# Patient Record
Sex: MALE | Race: WHITE | Employment: OTHER | URBAN - METROPOLITAN AREA
[De-identification: names, ages, dates, MRNs, and addresses within clinical notes are randomized per-mention and may not be internally consistent; named-entity substitution may affect disease eponyms.]

---

## 2017-01-24 ENCOUNTER — OFFICE VISIT (OUTPATIENT)
Dept: DERMATOLOGY | Facility: CLINIC | Age: 62
End: 2017-01-24
Payer: MEDICARE

## 2017-01-24 DIAGNOSIS — L98.9 SKIN EROSION: Primary | ICD-10-CM

## 2017-01-24 PROCEDURE — 99213 OFFICE O/P EST LOW 20 MIN: CPT | Performed by: DERMATOLOGY

## 2017-01-24 PROCEDURE — 87070 CULTURE OTHR SPECIMN AEROBIC: CPT | Performed by: DERMATOLOGY

## 2017-01-24 RX ORDER — LEVOFLOXACIN 750 MG/1
TABLET, FILM COATED ORAL DAILY
COMMUNITY

## 2017-01-24 RX ORDER — GUAIFENESIN 400 MG/1
TABLET ORAL 4 TIMES DAILY
COMMUNITY

## 2017-01-24 NOTE — Clinical Note
Patient:  Marcus Cevallos  :   1955  MRN:     [id number]        Mr.William WALTER Cevallos  [address] [address]        2017      Dear Marcus Cevalols,    We are writing to inform you of your test results that show no evidence of skin infection.     Thank you for taking the time to be seen in our dermatology clinic. If you have further questions or concerns, please contact the clinic(see phone number listed below).      Sincerely,    Pastora Peterson MD    Department of Dermatology  [facility]: Phone: [phone], Fax:[phone]  [facility]: Phone: [phone], Fax: [phone]    Resulted Orders   Skin Culture Aerobic Bacterial   Result Value Ref Range    Specimen Description Left Buttock     Culture Micro Moderate growth Normal skin rahel     Micro Report Status FINAL 2017

## 2017-01-24 NOTE — PROGRESS NOTES
[facility] Dermatology Note      Dermatology Problem List:  1. Intertrigo, groin  -Current Tx: Bactroban ointment (intiaited 6/13/2016), A&D ointment (initiated 10/16/2015)  -Previous Tx: Hibiclens (initiated 6/13/2016), clindamycin lotion (10/21/2015-6/13/2016), gentamicin ointment (initiated 10/21/2015), ketoconazole cream (10/16/2015-6/13/2016)  -s/p culture showing Klebsiella pneumoniae, Citrobacter freundii complex, Proteus mirabilis, Beta hemolytic Streptococcus group B and S. aureus 10/16/2015    Encounter Date: Jan 24, 2017    CC:  Chief Complaint   Patient presents with     RECHECK     6 month follow up - Rash and sore on buttock - care team states that the rash is better, the sore re-appeared about 5 days ago - it is not deep but it is light pink       History of Present Illness:  Mr. Marcus Cevallos is a 61 year old male who presents as a follow-up for lesion on the buttock. The patient was last seen 7/18/2016 when Gentamycin was held and Bactroban and A&D ointment continued for intertrigo and ulceration. Today, the patient's caretaker reports that the sore on the buttocks has returned in the past 5 days. Notes the patient spends a lot of time on his back and buttocks. The patient reports no other lesions of concern.      Past Medical History:   Patient Active Problem List   Diagnosis     Cerebral palsy (H)     Mental retardation     Sleep apnea     Retinal detachment, left     Seizure disorder (H)     CVA (cerebral vascular accident) (H)     Hypothyroid     Osteopenia     Overweight (BMI 25.0-29.9)     Hypertension goal BP (blood pressure) < 130/80     Hyperlipidemia LDL goal <100     Blepharitis     Vitamin D deficiency disease     History of retinal detachment     Osteoporosis     Past Medical History   Diagnosis Date     Hypothyroid      Obesity, unspecified      HTN (hypertension)      CVA (cerebral vascular accident) (H)      Seizure disorder (H)      Retinal detachment, left       Sleep apnea      Mental retardation      Cerebral palsy (H)      Cataract      No past surgical history on file.    Social History:  The patient is a vulnerable adult.     Family History:   Not obtained at visit.     Medications:  Current Outpatient Prescriptions   Medication Sig Dispense Refill     perampanel (FYCOMPA) 6 MG tablet Take one tablet (6 mg) at HS 30 tablet 3     topiramate (TOPAMAX) 25 MG tablet Take 2 tab (50 mg) per G-tube twice daily for 3 weeks, then 1 tab bid (25mg) for 3 weeks, then stop 130 tablet 0     Ascorbic Acid (VITAMIN C PO) Take 500 mg by mouth daily       diazepam (DIAZEPAM INTENSOL) 5 MG/ML solution Give 1 ml buccally prn for a convulsive seizure > 1 min, 3 min of a non-convulsive seizure or for 2nd non convulsive seizure within 2 hrs of his first seizure. May repeat x 1 after 15 min. In 24 hours. 30 mL 1     valproic acid (DEPAKENE) 250 MG/5ML SYRP GIVE 10ML (500MG) PER G-TUBE TWICE DAILY 620 mL 5     levETIRAcetam (KEPPRA) 100 MG/ML solution GIVE 12.5ML (1250MG) PER G-TUBE TWICE DAILY 775 mL 11     loratadine (CLARITIN) 10 MG tablet Take 10 mg by mouth daily       bisacodyl (DULCOLAX) 10 MG suppository Place 10 mg rectally daily as needed for constipation       ACETAMINOPHEN  mg by Oral or G tube route as needed for pain       clindamycin (CLEOCIN T) 1 % lotion Apply topically 2 times daily 60 mL 11     polyvinyl alcohol (ARTIFICIAL TEARS) 1.4 % ophthalmic solution Instill one drop into both eyes 3 times a day. 6 mL 12     dimethicone (REMEDY NUTRASHIELD) 1 %        Aloe-Sodium Chloride (AYR SALINE NASAL GEL NA)        alendronate (FOSAMAX) 70 MG tablet Take 1 tablet every 7 days via G tube. Take with water and without food or other medication for at least 30 minutes. 13 tablet 3     ergocalciferol (CALCIFEROL) 8000 UNIT/ML drops 12 drops per g tube twice daily 30 mL      Polyethylene Glycol 3350 (MIRALAX PO) Take 8.5 mg by mouth daily        NYSTATIN EX Externally apply  topically as needed        aspirin 81 MG tablet Take 1 tablet by mouth daily. (*).   3     CARBAMIDE PEROXIDE 6.5 % OT SOLN as needed       PROCTOSOL HC 2.5 % RE CREA as needed       PATADAY 0.2 % OP SOLN None Entered       ALBUTEROL SULFATE (2.5 MG/3ML) 0.083% IN NEBU as needed       REPLETE/FIBER OR LIQD 1 250cc CAN PER GT 4X/DAY WITH 325CC WATER 4X/DAY=5.5 cans total  0     LEVOTHYROXINE SODIUM 50 MCG OR TABS 1 TABLET DAILY       CALCIUM CARBONATE 1250 MG/5ML OR SUSP 1 teaspoon down G tube daily 90 days 3       Allergies   Allergen Reactions     No Known Allergies          Review of Systems:  -Skin: As above in HPI. No additional skin concerns.  -Const- has had pneumonia he is getting over.   Physical exam:  There were no vitals taken for this visit.  GEN: This is a well developed, well-nourished male in no acute distress, in a pleasant mood.    SKIN: Focused examination of the buttocks and groin was performed.  -Erythematous patch with area of acanthosis and focal area of maceration on the left buttocks.   -Bilateral groin are WNL.   -No other lesions of concern on areas examined.     Impression/Plan:  1. Skin erosion, left buttock. Likely secondary to pressure. Previously resolved, recently returned.     Restart Bactroban ointment prn.    Restart A&D Ointment once daily to the area.     Recommend spending more time on his right side instead of his back.    Culture obtained today.      Plan to contact ortho to discuss pads to relieve pressure from wheelchair. DME for wheel chair cushion written  2. Intertrigo, groin. Improved with topicals.     Continue Bactroban ointment as needed.     Continue A&D ointment with brief changes    Follow-up in 2 months, earlier for new or changing lesions.     Staff Involved:  Scribe/Staff    Scribe Disclosure:   I, Emelyn Rodriguez, am serving as a scribe to document services personally performed by Dr. Pastora Peterson, based on data collection and the provider's statements to me.      Provider Disclosure:   I agree with above History, Review of Systems, Physical exam and Plan. I have reviewed the content of the documentation and have edited it as needed. I have personally performed the services documented here and the documentation accurately represents those services and the decisions I have made.     Pastora Peterson MD    Department of Dermatology  [facility]: Phone: [phone], Fax:[phone]  [facility]: Phone: [phone], Fax: [phone]

## 2017-01-24 NOTE — NURSING NOTE
Dermatology Rooming Note    Marcus Cevallos's goals for this visit include:   Chief Complaint   Patient presents with     RECHECK     6 month follow up - Rash and sore on buttock - care team states that the rash is better, the sore re-appeared about 5 days ago - it is not deep but it is light pink       Is a scribe okay for this visit: YES    Are records needed for this visit(If yes, obtain release of information): NO     Vitals: There were no vitals taken for this visit.    Referring Provider:  No referring provider defined for this encounter.    Danielle Plasencia, CMA

## 2017-01-26 DIAGNOSIS — G40.909 SEIZURE DISORDER (H): Primary | ICD-10-CM

## 2017-01-26 LAB
BACTERIA SPEC CULT: NORMAL
MICRO REPORT STATUS: NORMAL
SPECIMEN SOURCE: NORMAL

## 2017-02-05 ENCOUNTER — OFFICE VISIT (OUTPATIENT)
Dept: URGENT CARE | Facility: URGENT CARE | Age: 62
End: 2017-02-05
Payer: MEDICARE

## 2017-02-05 VITALS
HEART RATE: 82 BPM | OXYGEN SATURATION: 96 % | BODY MASS INDEX: 28.26 KG/M2 | TEMPERATURE: 98.9 F | DIASTOLIC BLOOD PRESSURE: 63 MMHG | SYSTOLIC BLOOD PRESSURE: 114 MMHG | RESPIRATION RATE: 16 BRPM | WEIGHT: 149.5 LBS

## 2017-02-05 DIAGNOSIS — N39.0 BACTERIAL URINARY TRACT INFECTION: ICD-10-CM

## 2017-02-05 DIAGNOSIS — R30.0 DYSURIA: Primary | ICD-10-CM

## 2017-02-05 DIAGNOSIS — G80.9 CEREBRAL PALSY, UNSPECIFIED TYPE (H): ICD-10-CM

## 2017-02-05 DIAGNOSIS — A49.9 BACTERIAL URINARY TRACT INFECTION: ICD-10-CM

## 2017-02-05 DIAGNOSIS — R82.90 ABNORMAL URINALYSIS: ICD-10-CM

## 2017-02-05 DIAGNOSIS — G40.909 SEIZURE DISORDER (H): ICD-10-CM

## 2017-02-05 LAB
ALBUMIN UR-MCNC: 100 MG/DL
APPEARANCE UR: ABNORMAL
BACTERIA #/AREA URNS HPF: ABNORMAL /HPF
BILIRUB UR QL STRIP: NEGATIVE
COLOR UR AUTO: YELLOW
GLUCOSE UR STRIP-MCNC: NEGATIVE MG/DL
HGB UR QL STRIP: ABNORMAL
KETONES UR STRIP-MCNC: ABNORMAL MG/DL
LEUKOCYTE ESTERASE UR QL STRIP: ABNORMAL
NITRATE UR QL: POSITIVE
PH UR STRIP: 7.5 PH (ref 5–7)
RBC #/AREA URNS AUTO: ABNORMAL /HPF (ref 0–2)
SP GR UR STRIP: 1.02 (ref 1–1.03)
URN SPEC COLLECT METH UR: ABNORMAL
UROBILINOGEN UR STRIP-ACNC: 1 EU/DL (ref 0.2–1)
WBC #/AREA URNS AUTO: >100 /HPF (ref 0–2)

## 2017-02-05 PROCEDURE — 99213 OFFICE O/P EST LOW 20 MIN: CPT | Performed by: PHYSICIAN ASSISTANT

## 2017-02-05 PROCEDURE — 87186 SC STD MICRODIL/AGAR DIL: CPT | Performed by: PHYSICIAN ASSISTANT

## 2017-02-05 PROCEDURE — 87088 URINE BACTERIA CULTURE: CPT | Performed by: PHYSICIAN ASSISTANT

## 2017-02-05 PROCEDURE — 81001 URINALYSIS AUTO W/SCOPE: CPT | Performed by: PHYSICIAN ASSISTANT

## 2017-02-05 PROCEDURE — 87086 URINE CULTURE/COLONY COUNT: CPT | Performed by: PHYSICIAN ASSISTANT

## 2017-02-05 RX ORDER — SULFAMETHOXAZOLE/TRIMETHOPRIM 800-160 MG
1 TABLET ORAL 2 TIMES DAILY
Qty: 14 TABLET | Refills: 0 | Status: SHIPPED | OUTPATIENT
Start: 2017-02-05 | End: 2017-03-30

## 2017-02-05 RX ORDER — SULFAMETHOXAZOLE/TRIMETHOPRIM 800-160 MG
1 TABLET ORAL 2 TIMES DAILY
Qty: 14 TABLET | Refills: 0 | Status: SHIPPED | OUTPATIENT
Start: 2017-02-05 | End: 2017-02-05

## 2017-02-05 NOTE — PROGRESS NOTES
SUBJECTIVE:                                                    Marcus Cevallos is a 61 year old male who presents to clinic today for the following health issues:      Pt is here with his care taker. He states that pt has some odor in his urine x 2 days. Also, pt had two seizures yesterday and one this morning. Fever of 101.0 and was given Tylenol at 8:30AM today.    slight increase in baseline sz activity, + sinus congestion x 1 month, recent dx of PNA< tx'd with levaquin and azithormycin (through [facility] physicians)- finished levaquin 1/27.  Uses diaper.  Did have low grade temp last night, no cough,                Allergies   Allergen Reactions     No Known Allergies        Past Medical History   Diagnosis Date     Hypothyroid      Obesity, unspecified      HTN (hypertension)      CVA (cerebral vascular accident) (H)      Seizure disorder (H)      Retinal detachment, left      Sleep apnea      Mental retardation      Cerebral palsy (H)      Cataract          Current Outpatient Prescriptions on File Prior to Visit:  valproic acid (DEPAKENE) 250 MG/5ML SYRP syrup TAKE 10ML (500MG) PER G-TUBE TWICE DAILY.   levofloxacin (LEVAQUIN) 750 MG tablet Take by mouth daily 1 tab for 5 days - D/C 1.27.17   guaiFENesin 400 MG TABS Per packet instructions as needed   order for DME Equipment being ordered: Wheel Chair Cushion   perampanel (FYCOMPA) 6 MG tablet Take one tablet (6 mg) at HS   topiramate (TOPAMAX) 25 MG tablet Take 2 tab (50 mg) per G-tube twice daily for 3 weeks, then 1 tab bid (25mg) for 3 weeks, then stop   Ascorbic Acid (VITAMIN C PO) Take 500 mg by mouth daily   diazepam (DIAZEPAM INTENSOL) 5 MG/ML solution Give 1 ml buccally prn for a convulsive seizure > 1 min, 3 min of a non-convulsive seizure or for 2nd non convulsive seizure within 2 hrs of his first seizure. May repeat x 1 after 15 min. In 24 hours.   levETIRAcetam (KEPPRA) 100 MG/ML solution GIVE 12.5ML (1250MG) PER G-TUBE TWICE DAILY    loratadine (CLARITIN) 10 MG tablet Take 10 mg by mouth daily   bisacodyl (DULCOLAX) 10 MG suppository Place 10 mg rectally daily as needed for constipation   ACETAMINOPHEN  mg by Oral or G tube route as needed for pain   clindamycin (CLEOCIN T) 1 % lotion Apply topically 2 times daily   polyvinyl alcohol (ARTIFICIAL TEARS) 1.4 % ophthalmic solution Instill one drop into both eyes 3 times a day.   dimethicone (REMEDY NUTRASHIELD) 1 %    Aloe-Sodium Chloride (AYR SALINE NASAL GEL NA)    alendronate (FOSAMAX) 70 MG tablet Take 1 tablet every 7 days via G tube. Take with water and without food or other medication for at least 30 minutes.   ergocalciferol (CALCIFEROL) 8000 UNIT/ML drops 12 drops per g tube twice daily   Polyethylene Glycol 3350 (MIRALAX PO) Take 8.5 mg by mouth daily    NYSTATIN EX Externally apply topically as needed    aspirin 81 MG tablet Take 1 tablet by mouth daily. (*).    CARBAMIDE PEROXIDE 6.5 % OT SOLN as needed   PROCTOSOL HC 2.5 % RE CREA as needed   PATADAY 0.2 % OP SOLN None Entered   ALBUTEROL SULFATE (2.5 MG/3ML) 0.083% IN NEBU as needed   REPLETE/FIBER OR LIQD 1 250cc CAN PER GT 4X/DAY WITH 325CC WATER 4X/DAY=5.5 cans total   LEVOTHYROXINE SODIUM 50 MCG OR TABS 1 TABLET DAILY   CALCIUM CARBONATE 1250 MG/5ML OR SUSP 1 teaspoon down G tube daily     No current facility-administered medications on file prior to visit.    Social History   Substance Use Topics     Smoking status: Never Smoker      Smokeless tobacco: Never Used     Alcohol Use: No       ROS:   GEN as above  RESP as above  ENT as above   as above    OBJECTIVE:  /63 mmHg  Pulse 82  Temp(Src) 98.9  F (37.2  C) (Oral)  Resp 16  Wt 149 lb 8 oz (67.813 kg)  SpO2 96%   General:   awake, alert, and cooperative.  NAD.   Head: Normocephalic, atraumatic.  Eyes: Conjunctiva clear,   Neuro: Alert and oriented - normal speech.    ASSESSMENT:well appearing, UA  C/w UTI    ICD-10-CM    1. Dysuria R30.0 *UA reflex to  Microscopic and Culture ([facility] and [facility] (except [place] and [place])     Urine Microscopic   2. Bacterial urinary tract infection N39.0 sulfamethoxazole-trimethoprim (BACTRIM DS) 800-160 MG per tablet    A49.9    3. Abnormal urinalysis R82.90 Urine Culture Aerobic Bacterial   4. Cerebral palsy, unspecified type (H) G80.9    5. Seizure disorder (H) G40.909        PLAN: Straight cath needed to get urine sample.  This was done with typical sterile technique, Urine dark yellow. Patient tolerated well       Advised about symptoms which might herald more serious problems.

## 2017-02-05 NOTE — PATIENT INSTRUCTIONS
"   * BLADDER INFECTION: Male (Adult)    A bladder infection (\"cystitis\" or \"UTI\") usually causes a constant urge to urinate, and a burning when passing urine. Urine may be cloudy, smelly or dark. There may be also be pain in the lower abdomen.  Cystitis in males is not common. It may be caused by a partial blockage in the urinary system that keeps the bladder from emptying completely. This is most often related to an enlarged prostate gland.  HOME CARE:  1. Drink lots of fluids (at least 6-8 glasses a day). This will flush the bacteria out of your bladder. Cranberry juice has been shown to help clear out the bacteria.  2. Avoid sexual intercourse until your symptoms are gone.  3. A bladder infection is treated with antibiotics. You may also be given Pyridium (generic - phenazopyridine) to reduce burning with urination. This will cause urine to become a bright orange color, which can stain clothing.  FOLLOW UP with your doctor or this facility if ALL symptoms have not cleared within five days. It is important to keep your follow up appointment to discuss with your doctor the need for further tests of the urinary tract.  GET PROMPT MEDICAL ATTENTION if any of the following occur:    Fever over 101  F (38.3  C)    No improvement by the third day of treatment    Increasing back or abdominal pain    Repeated vomiting; unable to keep medicine down    Weakness, dizziness or fainting    1806-1002 [employer], [address] [place] [address], [place] [address]. All rights reserved. This information is not intended as a substitute for professional medical care. Always follow your healthcare professional's instructions.    "

## 2017-02-05 NOTE — NURSING NOTE
"Chief Complaint   Patient presents with     Urinary Problem     Froday. Care taker states that pt has some strong odor in his urine       Initial /63 mmHg  Pulse 82  Temp(Src) 98.9  F (37.2  C) (Oral)  Wt 149 lb 8 oz (67.813 kg)  SpO2 96% Estimated body mass index is 28.26 kg/(m^2) as calculated from the following:    Height as of 12/9/16: 5' 0.98\" (1.549 m).    Weight as of this encounter: 149 lb 8 oz (67.813 kg).  Medication Reconciliation: complete   Hasmukh Farias/MA      "

## 2017-02-05 NOTE — MR AVS SNAPSHOT
"              After Visit Summary   2/5/2017    Marcus Cevallos    MRN: [id number]           Patient Information     Date Of Birth          1955        Visit Information        Provider Department      2/5/2017 1:35 PM Salomon Gilman PA [facility]        Today's Diagnoses     Dysuria    -  1     Bacterial urinary tract infection         Abnormal urinalysis         Cerebral palsy, unspecified type (H)         Seizure disorder (H)           Care Instructions       * BLADDER INFECTION: Male (Adult)    A bladder infection (\"cystitis\" or \"UTI\") usually causes a constant urge to urinate, and a burning when passing urine. Urine may be cloudy, smelly or dark. There may be also be pain in the lower abdomen.  Cystitis in males is not common. It may be caused by a partial blockage in the urinary system that keeps the bladder from emptying completely. This is most often related to an enlarged prostate gland.  HOME CARE:  1. Drink lots of fluids (at least 6-8 glasses a day). This will flush the bacteria out of your bladder. Cranberry juice has been shown to help clear out the bacteria.  2. Avoid sexual intercourse until your symptoms are gone.  3. A bladder infection is treated with antibiotics. You may also be given Pyridium (generic - phenazopyridine) to reduce burning with urination. This will cause urine to become a bright orange color, which can stain clothing.  FOLLOW UP with your doctor or this facility if ALL symptoms have not cleared within five days. It is important to keep your follow up appointment to discuss with your doctor the need for further tests of the urinary tract.  GET PROMPT MEDICAL ATTENTION if any of the following occur:    Fever over 101  F (38.3  C)    No improvement by the third day of treatment    Increasing back or abdominal pain    Repeated vomiting; unable to keep medicine down    Weakness, dizziness or fainting    9800-8771 [employer], [address] [place] [address]. All rights reserved. This information is not intended as a substitute for professional medical care. Always follow your healthcare professional's instructions.          Follow-ups after your visit        Follow-up notes from your care team     Return if symptoms worsen or fail to improve.      Your next 10 appointments already scheduled     Mar 30, 2017 10:00 AM   [facility]    [address] [place] [address] [id number] [phone]            Apr 21, 2017  9:00 AM   Return Visit with Nanda De La Torre MD   [facility] ([facility])    [address], [address]  [facility] [id number] [phone]            Jun 30, 2017  8:30 AM   Return Visit with Sona Kapoor MD   [facility]    [address] [place] [id number] [phone]              Who to contact     If you have questions or need follow up information about today's clinic visit or your schedule please contact [facility] directly at [phone].  Normal or non-critical lab and imaging results will be communicated to you by MyChart, letter or phone within 4 business days after the clinic has received the results. If you do not hear from us within 7 days, please contact the clinic through MyChart or phone. If you have a critical or abnormal lab result, we will notify you by phone as soon as possible.  Submit refill requests through [email or web address] or call your pharmacy and they will forward the refill request to us. Please allow 3 business days for your refill to be completed.          Additional Information About Your Visit        MyChart Information     [email or web address] lets you send messages to your doctor, view your test results, renew your prescriptions, schedule appointments and more. To sign up, go to [email or web address] . Click  "on \"Log in\" on the left side of the screen, which will take you to the Welcome page. Then click on \"Sign up Now\" on the right side of the page.     You will be asked to enter the access code listed below, as well as some personal information. Please follow the directions to create your username and password.     Your access code is: [email or web address]  Expires: 2017  4:03 PM     Your access code will  in 90 days. If you need help or a new code, please call your [place] clinic or [phone].        Care EveryWhere ID     This is your Care EveryWhere ID. This could be used by other organizations to access your [place] medical records  [id number]        Your Vitals Were     Pulse Temperature Respirations Pulse Oximetry          82 98.9  F (37.2  C) (Oral) 16 96%         Blood Pressure from Last 3 Encounters:   17 114/63   16 127/74   16 135/78    Weight from Last 3 Encounters:   17 149 lb 8 oz (67.813 kg)   16 149 lb 9.6 oz (67.858 kg)   16 151 lb 9.6 oz (68.765 kg)              We Performed the Following     *UA reflex to Microscopic and Culture ([facility] (except [place] and [place])     Urine Culture Aerobic Bacterial     Urine Microscopic          Today's Medication Changes          These changes are accurate as of: 17  4:03 PM.  If you have any questions, ask your nurse or doctor.               Start taking these medicines.        Dose/Directions    sulfamethoxazole-trimethoprim 800-160 MG per tablet   Commonly known as:  BACTRIM DS   Used for:  Bacterial urinary tract infection   Started by:  Salomon Gilman PA        Dose:  1 tablet   Take 1 tablet by mouth 2 times daily for 7 days   Quantity:  14 tablet   Refills:  0            Where to get your medicines      These medications were sent to [facility] - [place], [place] - [address]  [address] [facility] [address]     Phone:  " [phone]    - sulfamethoxazole-trimethoprim 800-160 MG per tablet             Primary Care Provider Office Phone # Fax #    Maria Eugenia WATSON Fili Dowell MD, [phone]       [facility] [address]  [facility] [address]        Thank you!     Thank you for choosing [facility]  for your care. Our goal is always to provide you with excellent care. Hearing back from our patients is one way we can continue to improve our services. Please take a few minutes to complete the written survey that you may receive in the mail after your visit with us. Thank you!             Your Updated Medication List - Protect others around you: Learn how to safely use, store and throw away your medicines at www.disposemymeds.org.          This list is accurate as of: 2/5/17  4:03 PM.  Always use your most recent med list.                   Brand Name Dispense Instructions for use    ACETAMINOPHEN PO      325 mg by Oral or G tube route as needed for pain       albuterol (2.5 MG/3ML) 0.083% neb solution      as needed       alendronate 70 MG tablet    FOSAMAX    13 tablet    Take 1 tablet every 7 days via G tube. Take with water and without food or other medication for at least 30 minutes.       aspirin 81 MG tablet      Take 1 tablet by mouth daily. (*).       AYR SALINE NASAL GEL NA          bisacodyl 10 MG Suppository    DULCOLAX     Place 10 mg rectally daily as needed for constipation       CALCIFEROL 8000 UNIT/ML drops   Generic drug:  ergocalciferol     30 mL    12 drops per g tube twice daily       calcium carbonate 1250 (500 CA) MG/5ML Susp suspension     90 days    1 teaspoon down G tube daily       carbamide peroxide 6.5 % otic solution    DEBROX     as needed       clindamycin 1 % lotion    CLEOCIN T    60 mL    Apply topically 2 times daily       diazepam 5 MG/ML (HIGH CONC) solution    DIAZEPAM INTENSOL    30 mL    Give 1 ml buccally prn for a convulsive seizure > 1 min,  3 min of a non-convulsive seizure or for 2nd non convulsive seizure within 2 hrs of his first seizure. May repeat x 1 after 15 min. In 24 hours.       guaiFENesin 400 MG Tabs      Per packet instructions as needed       levETIRAcetam 100 MG/ML solution    KEPPRA    775 mL    GIVE 12.5ML (1250MG) PER G-TUBE TWICE DAILY       levofloxacin 750 MG tablet    LEVAQUIN     Take by mouth daily 1 tab for 5 days - D/C 1.27.17       levothyroxine 50 MCG tablet    SYNTHROID/LEVOTHROID     1 TABLET DAILY       loratadine 10 MG tablet    CLARITIN     Take 10 mg by mouth daily       MIRALAX PO      Take 8.5 mg by mouth daily       NYSTATIN EX      Externally apply topically as needed       order for DME     1 each    Equipment being ordered: Wheel Chair Cushion       PATADAY 0.2 % Soln   Generic drug:  olopatadine HCl      None Entered       perampanel 6 MG tablet    FYCOMPA    30 tablet    Take one tablet (6 mg) at HS       polyvinyl alcohol 1.4 % ophthalmic solution    ARTIFICIAL TEARS    6 mL    Instill one drop into both eyes 3 times a day.       PROCTOSOL HC 2.5 % cream   Generic drug:  hydrocortisone      as needed       REMEDY NUTRASHIELD 1 %   Generic drug:  dimethicone          REPLETE/FIBER Liqd      1 250cc CAN PER GT 4X/DAY WITH 325CC WATER 4X/DAY=5.5 cans total       sulfamethoxazole-trimethoprim 800-160 MG per tablet    BACTRIM DS    14 tablet    Take 1 tablet by mouth 2 times daily for 7 days       topiramate 25 MG tablet    TOPAMAX    130 tablet    Take 2 tab (50 mg) per G-tube twice daily for 3 weeks, then 1 tab bid (25mg) for 3 weeks, then stop       valproic acid 250 MG/5ML Syrp syrup    DEPAKENE    620 mL    TAKE 10ML (500MG) PER G-TUBE TWICE DAILY.       VITAMIN C PO      Take 500 mg by mouth daily

## 2017-02-07 ENCOUNTER — TELEPHONE (OUTPATIENT)
Dept: URGENT CARE | Facility: URGENT CARE | Age: 62
End: 2017-02-07

## 2017-02-07 ENCOUNTER — TELEPHONE (OUTPATIENT)
Dept: FAMILY MEDICINE | Facility: CLINIC | Age: 62
End: 2017-02-07

## 2017-02-07 DIAGNOSIS — A49.9 BACTERIAL URINARY TRACT INFECTION: Primary | ICD-10-CM

## 2017-02-07 DIAGNOSIS — N39.0 BACTERIAL URINARY TRACT INFECTION: Primary | ICD-10-CM

## 2017-02-07 LAB
BACTERIA SPEC CULT: ABNORMAL
MICRO REPORT STATUS: ABNORMAL
MICROORGANISM SPEC CULT: ABNORMAL
SPECIMEN SOURCE: ABNORMAL

## 2017-02-07 RX ORDER — CEPHALEXIN 500 MG/1
500 CAPSULE ORAL 3 TIMES DAILY
Qty: 21 CAPSULE | Refills: 0 | Status: SHIPPED | OUTPATIENT
Start: 2017-02-07 | End: 2017-02-14

## 2017-02-07 RX ORDER — NITROFURANTOIN 25; 75 MG/1; MG/1
100 CAPSULE ORAL 2 TIMES DAILY
Qty: 14 CAPSULE | Refills: 0 | Status: SHIPPED | OUTPATIENT
Start: 2017-02-07 | End: 2017-02-14

## 2017-02-07 NOTE — TELEPHONE ENCOUNTER
We can try keflex instead.  Patient should d/c bactrim as UTI is resistant- please inform pharmacy and caregivers  Jaydon Gilman PA-C

## 2017-02-07 NOTE — TELEPHONE ENCOUNTER
Notes Recorded by Salomon Gilman PA on 2/7/2017 at 9:30 AM  Please call caregiver about culture results which grew bacteria resistant to the antibiotic prescribed.  I have prescribed a different medication which should be started today.  The current antibiotic should be stopped.  Patient should contact the clinic with additional questions  Salomon Gilman     Notes Recorded by Evan Summers MD on 2/6/2017 at 2:03 PM  Micro report pending, RX Bactrim.  Eavn Summers MD  February 6, 2017 2:03 PM    This writer attempted to contact Marcus's care taker on 02/07/2017.    Was call answered?  No.  Unable to leave message.    If patient calls back, please Contact Clinic Care (MA/RN) Team. If no one available, send encounter message.    Brittaney Hewitt RN

## 2017-02-07 NOTE — TELEPHONE ENCOUNTER
[phone] nitrofurantoin, macrocrystal-monohydrate, (MACROBID) 100 MG capsule  Patient has a G-tube and his medication cannot be used with G-tube.  Can this be changed?    Group home staff is asking if patient should continue to use Bactrim.    Please call to advise.    Thank you,    Neris Ferrell

## 2017-02-07 NOTE — TELEPHONE ENCOUNTER
Called and spoke with Con[facility]is at [facility]. She states that the medication was received and that the patient is informed of this.    Amanda Philip CMA ([facility])

## 2017-03-01 ENCOUNTER — TELEPHONE (OUTPATIENT)
Dept: NEUROLOGY | Facility: CLINIC | Age: 62
End: 2017-03-01

## 2017-03-01 DIAGNOSIS — G40.419 INTRACTABLE GENERALIZED TONIC-CLONIC EPILEPSY (H): ICD-10-CM

## 2017-03-01 RX ORDER — DIAZEPAM ORAL SOLUTION (CONCENTRATE) 5 MG/ML
SOLUTION ORAL
Qty: 30 ML | Refills: 0 | Status: SHIPPED | OUTPATIENT
Start: 2017-03-01 | End: 2017-06-22

## 2017-03-17 ENCOUNTER — TELEPHONE (OUTPATIENT)
Dept: FAMILY MEDICINE | Facility: CLINIC | Age: 62
End: 2017-03-17

## 2017-03-17 NOTE — LETTER
[facility]       [address]  [place] [address]      March 17, 2017      Marcus Cevallos  [address] [place] [address]          Dear Marcus Cevallos,      At [facility] we care about your health and are committed to providing quality patient care, which includes staying current on preventative cancer screenings.  You can increase your chances of finding and treating cancers through regular screenings.      Our records show that you are due for the following screening(s):    Colonoscopy for colon cancer  [facility] [phone]  Recommended every ten years for everyone age 50 and older  We strongly urge our patient's to consider having a colonoscopy done, which is the best screening test available and only needs to be done every 10 years if normal.      Other option is that you can do a FIT and this is once a year.  Any questions or concern, please contact us at [phone].    You may contact the closest location to schedule the screening test(s) at your earliest convenience.    If you have already had one or all of the above screening tests at another facility, please call us so that we may update your chart.      Sincerely,         Jeb Acosta MD/ MILKA  [facility]

## 2017-03-17 NOTE — TELEPHONE ENCOUNTER
Panel Management Review      Patient has the following on his problem list:     Hypertension   Last three blood pressure readings:  BP Readings from Last 3 Encounters:   02/05/17 114/63   12/09/16 127/74   11/28/16 135/78     Blood pressure: Passed    HTN Guidelines:  Age 18-59 BP range:  Less than 140/90  Age 60-85 with Diabetes:  Less than 140/90  Age 60-85 without Diabetes:  less than 150/90      Composite cancer screening  Chart review shows that this patient is due/due soon for the following Fecal Colorectal (FIT)  Summary:    Patient is due/failing the following:   COLONOSCOPY    Action needed:   Patient needs office visit for physical.    Type of outreach:    Phone, left message for patient to call back.  and Sent letter.    Questions for provider review:    None                                                                                                                                    Gregory Nichols MA

## 2017-03-30 ENCOUNTER — OFFICE VISIT (OUTPATIENT)
Dept: DERMATOLOGY | Facility: CLINIC | Age: 62
End: 2017-03-30
Payer: MEDICARE

## 2017-03-30 DIAGNOSIS — L30.4 INTERTRIGO: ICD-10-CM

## 2017-03-30 DIAGNOSIS — D18.01 CHERRY ANGIOMA: Primary | ICD-10-CM

## 2017-03-30 PROCEDURE — 99213 OFFICE O/P EST LOW 20 MIN: CPT | Performed by: DERMATOLOGY

## 2017-03-30 NOTE — PROGRESS NOTES
[facility] Dermatology Note      Dermatology Problem List:  1. Intertrigo, groin  -Current Tx: Bactroban ointment (intiaited 6/13/2016), A&D ointment (initiated 10/16/2015)  -Previous Tx: Hibiclens (initiated 6/13/2016), clindamycin lotion (10/21/2015-6/13/2016), gentamicin ointment (initiated 10/21/2015), ketoconazole cream (10/16/2015-6/13/2016)  -s/p culture showing Klebsiella pneumoniae, Citrobacter freundii complex, Proteus mirabilis, Beta hemolytic Streptococcus group B and S. aureus 10/16/2015  2. Erosion, buttocks  -Current Tx: A&D Ointment, Bactroban  -culture showing no growth 1/24/2017    Encounter Date: Mar 30, 2017    CC:  Chief Complaint   Patient presents with     Derm Problem     rassore  in groin  area ok today. spiratic irritation through out the month. will have periods of no irritation and periods when it is sore and irritated       History of Present Illness:  Mr. Marcus Cevallos is a 62 year old male who presents as a follow up for erosions and intertrigo. The patient was last seen 1/24/2017 when Bactroban and A&D Ointment were restarted and a culture taken for erosion on the buttock. Topicals for intertrigo of the groin were continued. Today, the patient's aid reports the lesion on the left buttocks is improved and closed now. Denies patient complaints of the lesion. His groin is red has also been stable. The patient's aid reports they are trying to avoid pressure on the area and he is being rotated every 2 hours. For the groin they are using Bactroban prn with improvement. For the buttocks they are using Neutrashield (from his PCP) with brief change. Reports unusual redness leaving tan spots on the left flank. The patient reports no other lesions of concern.     The patient is present with an personal care assistant today.     Past Medical History:   Patient Active Problem List   Diagnosis     Cerebral palsy (H)     Mental retardation     Sleep apnea     Retinal  detachment, left     Seizure disorder (H)     CVA (cerebral vascular accident) (H)     Hypothyroid     Osteopenia     Overweight (BMI 25.0-29.9)     Hypertension goal BP (blood pressure) < 130/80     Hyperlipidemia LDL goal <100     Blepharitis     Vitamin D deficiency disease     History of retinal detachment     Osteoporosis     Past Medical History:   Diagnosis Date     Cataract      Cerebral palsy (H)      CVA (cerebral vascular accident) (H)      HTN (hypertension)      Hypothyroid      Mental retardation      Obesity, unspecified      Retinal detachment, left      Seizure disorder (H)      Sleep apnea      No past surgical history on file.    Social History:  The patient uses a wheelchair. He lives in a group setting.     Family History:   Not obtained at visit.     Medications:  Current Outpatient Prescriptions   Medication Sig Dispense Refill     diazepam (DIAZEPAM INTENSOL) 5 MG/ML (HIGH CONC) solution Give 1 ml buccally prn for a convulsive seizure > 1 min, 3 min of a non-convulsive seizure or for 2nd non convulsive seizure within 2 hrs of his first seizure. May repeat x 1 after 15 min. In 24 hours. 30 mL 0     sulfamethoxazole-trimethoprim (BACTRIM DS) 800-160 MG per tablet Take 1 tablet by mouth 2 times daily 14 tablet 0     valproic acid (DEPAKENE) 250 MG/5ML SYRP syrup TAKE 10ML (500MG) PER G-TUBE TWICE DAILY. 620 mL 10     levofloxacin (LEVAQUIN) 750 MG tablet Take by mouth daily 1 tab for 5 days - D/C 1.27.17       guaiFENesin 400 MG TABS Per packet instructions as needed       order for DME Equipment being ordered: Wheel Chair Cushion 1 each 1     perampanel (FYCOMPA) 6 MG tablet Take one tablet (6 mg) at HS 30 tablet 3     topiramate (TOPAMAX) 25 MG tablet Take 2 tab (50 mg) per G-tube twice daily for 3 weeks, then 1 tab bid (25mg) for 3 weeks, then stop 130 tablet 0     Ascorbic Acid (VITAMIN C PO) Take 500 mg by mouth daily       levETIRAcetam (KEPPRA) 100 MG/ML solution GIVE 12.5ML (1250MG) PER  G-TUBE TWICE DAILY 775 mL 11     loratadine (CLARITIN) 10 MG tablet Take 10 mg by mouth daily       bisacodyl (DULCOLAX) 10 MG suppository Place 10 mg rectally daily as needed for constipation       ACETAMINOPHEN  mg by Oral or G tube route as needed for pain       clindamycin (CLEOCIN T) 1 % lotion Apply topically 2 times daily 60 mL 11     polyvinyl alcohol (ARTIFICIAL TEARS) 1.4 % ophthalmic solution Instill one drop into both eyes 3 times a day. 6 mL 12     dimethicone (REMEDY NUTRASHIELD) 1 %        Aloe-Sodium Chloride (AYR SALINE NASAL GEL NA)        alendronate (FOSAMAX) 70 MG tablet Take 1 tablet every 7 days via G tube. Take with water and without food or other medication for at least 30 minutes. 13 tablet 3     ergocalciferol (CALCIFEROL) 8000 UNIT/ML drops 12 drops per g tube twice daily 30 mL      Polyethylene Glycol 3350 (MIRALAX PO) Take 8.5 mg by mouth daily        NYSTATIN EX Externally apply topically as needed        aspirin 81 MG tablet Take 1 tablet by mouth daily. (*).   3     CARBAMIDE PEROXIDE 6.5 % OT SOLN as needed       PROCTOSOL HC 2.5 % RE CREA as needed       PATADAY 0.2 % OP SOLN None Entered       ALBUTEROL SULFATE (2.5 MG/3ML) 0.083% IN NEBU as needed       REPLETE/FIBER OR LIQD 1 250cc CAN PER GT 4X/DAY WITH 325CC WATER 4X/DAY=5.5 cans total  0     LEVOTHYROXINE SODIUM 50 MCG OR TABS 1 TABLET DAILY       CALCIUM CARBONATE 1250 MG/5ML OR SUSP 1 teaspoon down G tube daily 90 days 3     Allergies   Allergen Reactions     No Known Allergies      Review of Systems:  -Uro: Recent UTI  -Const: Recent pneumonia.   -Skin: As above in HPI. No additional skin concerns.    Physical exam:  There were no vitals taken for this visit.  GEN: This is a well developed, well-nourished male in no acute distress, in a pleasant mood.    SKIN: Full skin, which includes the head/face, both arms, chest, back, abdomen,both legs, genitalia and/or groin buttocks, digits and/or nails, was examined.  -Chest  and abdomen are normal.   -Skin is normal in the groin.   -There are bright red some shaped papules scattered on the trunk.   -no lesions on flank  -hyperpigmented faint tan patches, bilateral inguinal folds.   -No other lesions of concern on areas examined.     Impression/Plan:  1. Cherry angiomas, trunk    No further intervention required at this time.   2. Skin erosion, left buttock. Likely secondary to pressure. Healed with topical use. None seen today Pt immobile, in wheelchair    Continue regular A and D ointment and rotating patient  3. Intertrigo, groin. Improved with topicals.     Hold Bactroban.     Start Nystatin cream to the groin as needed.     Continue Neutroshield with brief change as per PCP.    Follow-up in 6 months, earlier for new or changing lesions.     Staff Involved:  Scribe/Staff    Scribe Disclosure:   I, Emelyn Rodriguez, am serving as a scribe to document services personally performed by Dr. Pastora Peterson, based on data collection and the provider's statements to me.     Provider Disclosure:   I agree with above History, Review of Systems, Physical exam and Plan. I have reviewed the content of the documentation and have edited it as needed. I have personally performed the services documented here and the documentation accurately represents those services and the decisions I have made.     Pastora Peterson MD    Department of Dermatology  [facility]: Phone: [phone], Fax:[phone]  [facility]: Phone: [phone], Fax: [phone]

## 2017-03-30 NOTE — LETTER
3/30/2017      RE: Marcus Cevallos  [address] [facility] [address]       [facility] Dermatology Note      Dermatology Problem List:  1. Intertrigo, groin  -Current Tx: Bactroban ointment (intiaited 6/13/2016), A&D ointment (initiated 10/16/2015)  -Previous Tx: Hibiclens (initiated 6/13/2016), clindamycin lotion (10/21/2015-6/13/2016), gentamicin ointment (initiated 10/21/2015), ketoconazole cream (10/16/2015-6/13/2016)  -s/p culture showing Klebsiella pneumoniae, Citrobacter freundii complex, Proteus mirabilis, Beta hemolytic Streptococcus group B and S. aureus 10/16/2015  2. Erosion, buttocks  -Current Tx: A&D Ointment, Bactroban  -culture showing no growth 1/24/2017    Encounter Date: Mar 30, 2017    CC:  Chief Complaint   Patient presents with     Derm Problem     rassore  in groin  area ok today. spiratic irritation through out the month. will have periods of no irritation and periods when it is sore and irritated       History of Present Illness:  Mr. Marcus Cevallos is a 62 year old male who presents as a follow up for erosions and intertrigo. The patient was last seen 1/24/2017 when Bactroban and A&D Ointment were restarted and a culture taken for erosion on the buttock. Topicals for intertrigo of the groin were continued. Today, the patient's aid reports the lesion on the left buttocks is improved and closed now. Denies patient complaints of the lesion. His groin is red has also been stable. The patient's aid reports they are trying to avoid pressure on the area and he is being rotated every 2 hours. For the groin they are using Bactroban prn with improvement. For the buttocks they are using Neutrashield (from his PCP) with brief change. Reports unusual redness leaving tan spots on the left flank. The patient reports no other lesions of concern.     The patient is present with an personal care assistant today.     Past Medical History:   Patient Active Problem  List   Diagnosis     Cerebral palsy (H)     Mental retardation     Sleep apnea     Retinal detachment, left     Seizure disorder (H)     CVA (cerebral vascular accident) (H)     Hypothyroid     Osteopenia     Overweight (BMI 25.0-29.9)     Hypertension goal BP (blood pressure) < 130/80     Hyperlipidemia LDL goal <100     Blepharitis     Vitamin D deficiency disease     History of retinal detachment     Osteoporosis     Past Medical History:   Diagnosis Date     Cataract      Cerebral palsy (H)      CVA (cerebral vascular accident) (H)      HTN (hypertension)      Hypothyroid      Mental retardation      Obesity, unspecified      Retinal detachment, left      Seizure disorder (H)      Sleep apnea      No past surgical history on file.    Social History:  The patient uses a wheelchair. He lives in a group setting.     Family History:   Not obtained at visit.     Medications:  Current Outpatient Prescriptions   Medication Sig Dispense Refill     diazepam (DIAZEPAM INTENSOL) 5 MG/ML (HIGH CONC) solution Give 1 ml buccally prn for a convulsive seizure > 1 min, 3 min of a non-convulsive seizure or for 2nd non convulsive seizure within 2 hrs of his first seizure. May repeat x 1 after 15 min. In 24 hours. 30 mL 0     sulfamethoxazole-trimethoprim (BACTRIM DS) 800-160 MG per tablet Take 1 tablet by mouth 2 times daily 14 tablet 0     valproic acid (DEPAKENE) 250 MG/5ML SYRP syrup TAKE 10ML (500MG) PER G-TUBE TWICE DAILY. 620 mL 10     levofloxacin (LEVAQUIN) 750 MG tablet Take by mouth daily 1 tab for 5 days - D/C 1.27.17       guaiFENesin 400 MG TABS Per packet instructions as needed       order for DME Equipment being ordered: Wheel Chair Cushion 1 each 1     perampanel (FYCOMPA) 6 MG tablet Take one tablet (6 mg) at HS 30 tablet 3     topiramate (TOPAMAX) 25 MG tablet Take 2 tab (50 mg) per G-tube twice daily for 3 weeks, then 1 tab bid (25mg) for 3 weeks, then stop 130 tablet 0     Ascorbic Acid (VITAMIN C PO) Take 500  mg by mouth daily       levETIRAcetam (KEPPRA) 100 MG/ML solution GIVE 12.5ML (1250MG) PER G-TUBE TWICE DAILY 775 mL 11     loratadine (CLARITIN) 10 MG tablet Take 10 mg by mouth daily       bisacodyl (DULCOLAX) 10 MG suppository Place 10 mg rectally daily as needed for constipation       ACETAMINOPHEN  mg by Oral or G tube route as needed for pain       clindamycin (CLEOCIN T) 1 % lotion Apply topically 2 times daily 60 mL 11     polyvinyl alcohol (ARTIFICIAL TEARS) 1.4 % ophthalmic solution Instill one drop into both eyes 3 times a day. 6 mL 12     dimethicone (REMEDY NUTRASHIELD) 1 %        Aloe-Sodium Chloride (AYR SALINE NASAL GEL NA)        alendronate (FOSAMAX) 70 MG tablet Take 1 tablet every 7 days via G tube. Take with water and without food or other medication for at least 30 minutes. 13 tablet 3     ergocalciferol (CALCIFEROL) 8000 UNIT/ML drops 12 drops per g tube twice daily 30 mL      Polyethylene Glycol 3350 (MIRALAX PO) Take 8.5 mg by mouth daily        NYSTATIN EX Externally apply topically as needed        aspirin 81 MG tablet Take 1 tablet by mouth daily. (*).   3     CARBAMIDE PEROXIDE 6.5 % OT SOLN as needed       PROCTOSOL HC 2.5 % RE CREA as needed       PATADAY 0.2 % OP SOLN None Entered       ALBUTEROL SULFATE (2.5 MG/3ML) 0.083% IN NEBU as needed       REPLETE/FIBER OR LIQD 1 250cc CAN PER GT 4X/DAY WITH 325CC WATER 4X/DAY=5.5 cans total  0     LEVOTHYROXINE SODIUM 50 MCG OR TABS 1 TABLET DAILY       CALCIUM CARBONATE 1250 MG/5ML OR SUSP 1 teaspoon down G tube daily 90 days 3     Allergies   Allergen Reactions     No Known Allergies      Review of Systems:  -Uro: Recent UTI  -Const: Recent pneumonia.   -Skin: As above in HPI. No additional skin concerns.    Physical exam:  There were no vitals taken for this visit.  GEN: This is a well developed, well-nourished male in no acute distress, in a pleasant mood.    SKIN: Full skin, which includes the head/face, both arms, chest, back,  abdomen,both legs, genitalia and/or groin buttocks, digits and/or nails, was examined.  -Chest and abdomen are normal.   -Skin is normal in the groin.   -There are bright red some shaped papules scattered on the trunk.   -no lesions on flank  -hyperpigmented faint tan patches, bilateral inguinal folds.   -No other lesions of concern on areas examined.     Impression/Plan:  1. Cherry angiomas, trunk    No further intervention required at this time.   2. Skin erosion, left buttock. Likely secondary to pressure. Healed with topical use. None seen today Pt immobile, in wheelchair    Continue regular A and D ointment and rotating patient  3. Intertrigo, groin. Improved with topicals.     Hold Bactroban.     Start Nystatin cream to the groin as needed.     Continue Neutroshield with brief change as per PCP.    Follow-up in 6 months, earlier for new or changing lesions.     Staff Involved:  Scribe/Staff    Scribe Disclosure:   I, Emelyn Rodriguez, am serving as a scribe to document services personally performed by Dr. Pastora Peterson, based on data collection and the provider's statements to me.     Provider Disclosure:   I agree with above History, Review of Systems, Physical exam and Plan. I have reviewed the content of the documentation and have edited it as needed. I have personally performed the services documented here and the documentation accurately represents those services and the decisions I have made.     Pastora Peterson MD    Department of Dermatology  [facility]: Phone: [phone], Fax:[phone]  [facility]: Phone: [phone], Fax: [phone]        Pastora Peterson MD

## 2017-03-30 NOTE — MR AVS SNAPSHOT
After Visit Summary   3/30/2017    Marcus Cevallos    MRN: [id number]           Patient Information     Date Of Birth          1955        Visit Information        Provider Department      3/30/2017 10:00 AM Pastora Peterson MD [facility]         Follow-ups after your visit        Your next 10 appointments already scheduled     Apr 21, 2017  9:00 AM CDT   Return Visit with Nanda De La Torre MD   [facility] [place] [facility] ([facility])    [address], [address]  [facility] [address] [phone]            Jun 30, 2017  8:30 AM CDT   Return Visit with Sona Kapoor MD   [facility] ([facility]    [address] [place] [address] [phone]              Who to contact     If you have questions or need follow up information about today's clinic visit or your schedule please contact [facility] directly at [phone].  Normal or non-critical lab and imaging results will be communicated to you by MyChart, letter or phone within 4 business days after the clinic has received the results. If you do not hear from us within 7 days, please contact the clinic through MyChart or phone. If you have a critical or abnormal lab result, we will notify you by phone as soon as possible.  Submit refill requests through [employer] or call your pharmacy and they will forward the refill request to us. Please allow 3 business days for your refill to be completed.          Additional Information About Your Visit        [employer] Information     [employer] is an electronic gateway that provides easy, online access to your medical records. With [employer], you can request a clinic appointment, read your test results, renew a prescription or communicate with your care team.     To sign up for [employer] visit the website at [email or web address]   You will be asked to enter the access code listed below, as well as some  personal information. Please follow the directions to create your username and password.     Your access code is: [email or web address]  Expires: 2017  5:03 PM     Your access code will  in 90 days. If you need help or a new code, please contact your [facility] or call [phone] for assistance.        Care EveryWhere ID     This is your Care EveryWhere ID. This could be used by other organizations to access your [place] medical records  [id number]         Blood Pressure from Last 3 Encounters:   17 114/63   16 127/74   16 135/78    Weight from Last 3 Encounters:   17 67.8 kg (149 lb 8 oz)   16 67.9 kg (149 lb 9.6 oz)   16 68.8 kg (151 lb 9.6 oz)              Today, you had the following     No orders found for display       Primary Care Provider Office Phone # Fax #    Maria Eugenia WATSON Fili Dowell MD, [phone]       [facility] [address]  [facility] [address]        Thank you!     Thank you for choosing [facility]  for your care. Our goal is always to provide you with excellent care. Hearing back from our patients is one way we can continue to improve our services. Please take a few minutes to complete the written survey that you may receive in the mail after your visit with us. Thank you!             Your Updated Medication List - Protect others around you: Learn how to safely use, store and throw away your medicines at www.disposemymeds.org.          This list is accurate as of: 3/30/17 10:53 AM.  Always use your most recent med list.                   Brand Name Dispense Instructions for use    ACETAMINOPHEN PO      325 mg by Oral or G tube route as needed for pain       albuterol (2.5 MG/3ML) 0.083% neb solution      as needed       alendronate 70 MG tablet    FOSAMAX    13 tablet    Take 1 tablet every 7 days via G tube. Take with water and without food or other medication for at  least 30 minutes.       aspirin 81 MG tablet      Take 1 tablet by mouth daily. (*).       AYR SALINE NASAL GEL NA          bisacodyl 10 MG Suppository    DULCOLAX     Place 10 mg rectally daily as needed for constipation       CALCIFEROL 8000 UNIT/ML drops   Generic drug:  ergocalciferol     30 mL    12 drops per g tube twice daily       calcium carbonate 1250 (500 CA) MG/5ML Susp suspension     90 days    1 teaspoon down G tube daily       carbamide peroxide 6.5 % otic solution    DEBROX     as needed       diazepam 5 MG/ML (HIGH CONC) solution    DIAZEPAM INTENSOL    30 mL    Give 1 ml buccally prn for a convulsive seizure > 1 min, 3 min of a non-convulsive seizure or for 2nd non convulsive seizure within 2 hrs of his first seizure. May repeat x 1 after 15 min. In 24 hours.       guaiFENesin 400 MG Tabs      Per packet instructions as needed       levETIRAcetam 100 MG/ML solution    KEPPRA    775 mL    GIVE 12.5ML (1250MG) PER G-TUBE TWICE DAILY       levofloxacin 750 MG tablet    LEVAQUIN     Take by mouth daily 1 tab for 5 days - D/C 1.27.17       levothyroxine 50 MCG tablet    SYNTHROID/LEVOTHROID     1 TABLET DAILY       loratadine 10 MG tablet    CLARITIN     Take 10 mg by mouth daily       MIRALAX PO      Take 8.5 mg by mouth daily       NYSTATIN EX      Externally apply topically as needed       order for DME     1 each    Equipment being ordered: Wheel Chair Cushion       PATADAY 0.2 % Soln   Generic drug:  olopatadine HCl      None Entered       perampanel 6 MG tablet    FYCOMPA    30 tablet    Take one tablet (6 mg) at HS       polyvinyl alcohol 1.4 % ophthalmic solution    ARTIFICIAL TEARS    6 mL    Instill one drop into both eyes 3 times a day.       PROCTOSOL HC 2.5 % cream   Generic drug:  hydrocortisone      as needed       REMEDY NUTRASHIELD 1 %   Generic drug:  dimethicone          REPLETE/FIBER Liqd      1 250cc CAN PER GT 4X/DAY WITH 325CC WATER 4X/DAY=5.5 cans total       valproic acid 250  MG/5ML Syrp syrup    DEPAKENE    620 mL    TAKE 10ML (500MG) PER G-TUBE TWICE DAILY.       VITAMIN C PO      Take 500 mg by mouth daily

## 2017-04-13 DIAGNOSIS — G40.319 GENERALIZED CONVULSIVE EPILEPSY WITH INTRACTABLE EPILEPSY (H): Primary | ICD-10-CM

## 2017-04-19 ENCOUNTER — TELEPHONE (OUTPATIENT)
Dept: DERMATOLOGY | Facility: CLINIC | Age: 62
End: 2017-04-19

## 2017-04-19 NOTE — TELEPHONE ENCOUNTER
Patient was seen by Dr. Peterson on 3/30/17.  Per office visit note:  Impression/Plan:  1. Cherry angiomas, trunk    No further intervention required at this time.   2. Skin erosion, left buttock. Likely secondary to pressure. Healed with topical use.     Continue regular rotation.   3. Intertrigo, groin. Improved with topicals.     Hold Bactroban.     Start Nystatin cream to the groin as needed.     Continue Neutroshield with brief change as per PCP.    Forwarding to MD to review and advise on other treatment options.  aNt Myers RN

## 2017-04-19 NOTE — TELEPHONE ENCOUNTER
[facility] Call Center    Phone Message    Name of Caller: Jolanta    Phone Number: Other phone number:  [phone]    Best time to return call: Any    May a detailed message be left on voicemail: yes    Relation to patient: Other Name: Jolanta  Relationship: [facility]- Group home  Is there legal documentation in chart to discuss information with this person: NA    Reason for Call: Jolanta called and said Marcus's lesion re-opened a week ago.  Staff has been using NutriShield, A&D ointment.  On the lesions that are not open the staff is using Nystatin.  Requesting a call to discuss if there are other alternatives.  Thank you.      Action Taken: Message routed to:  Adult Clinics: Dermatology p [phone]

## 2017-04-20 NOTE — TELEPHONE ENCOUNTER
Jolanta called and notified of Dr. Peterson's message below. Jolanta states that they will look into having a culture done there and will  try keeping pressure off site and using A & D ointment but if no improvement they will call back to see a provider here in derm.....Hali Flores RN

## 2017-04-20 NOTE — TELEPHONE ENCOUNTER
Try to keep pressure off the area. I would do A and D ointment. See if the physician in the [facility] can do a culture of the site and take a look. Otherwise patient can come back to derm

## 2017-04-21 ENCOUNTER — OFFICE VISIT (OUTPATIENT)
Dept: NEUROLOGY | Facility: CLINIC | Age: 62
End: 2017-04-21

## 2017-04-21 VITALS
HEART RATE: 64 BPM | WEIGHT: 149.9 LBS | BODY MASS INDEX: 28.3 KG/M2 | SYSTOLIC BLOOD PRESSURE: 137 MMHG | DIASTOLIC BLOOD PRESSURE: 72 MMHG | HEIGHT: 61 IN

## 2017-04-21 DIAGNOSIS — G40.909 SEIZURE DISORDER (H): ICD-10-CM

## 2017-04-21 DIAGNOSIS — G40.319 GENERALIZED CONVULSIVE EPILEPSY WITH INTRACTABLE EPILEPSY (H): ICD-10-CM

## 2017-04-21 RX ORDER — LEVETIRACETAM 100 MG/ML
SOLUTION ORAL
Qty: 775 ML | Refills: 11 | Status: SHIPPED | OUTPATIENT
Start: 2017-04-21 | End: 2018-03-23

## 2017-04-21 NOTE — LETTER
2017     RE: Marcus Cevallos  : 1955   MRN: [id number]      Dear Colleague,    Thank you for referring your patient, Marcus Cevallos, to the [facility] at [facility]. Please see a copy of my visit note below.    INTERVAL HX: Now on 6 mg perampanel HS and doing very well. Now only approximately 1 sz per week; no increase in sedation.  SEIZURE HISTORY REVIEWED 17:  Marcus is a man, who has been a [facility] patient for many years.  This is his annual visit for evaluation of seizure control.  He is in a group home.  He is having his baseline number of seizures overall; but the group home feels that, if anything, he has improved somewhat.  They have fairly extensive records of seizures.  He has nonconvulsive seizures, approximately 15-20 per month.  These are very brief jerks.  He also has occasional generalized convulsive seizures, but these actually have not occurred during the last year.     He had his 1st seizure at age 7, but this is on a baseline of very severe issues.  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  He has a G tube in place for many years because of frequent aspiration pneumonias.  He had a stroke in the 1980s and subsequently has been unable to speak.  He has also had hemorrhoids, left ankle fracture and hip surgery.      FAMILY HISTORY:  Includes a sister, who has tuberous sclerosis.        He has been reliant on Diazepam Intensol through the G tube for clusters of seizures.  The diazepam is very helpful, and he has had no emergency room visits in the last year.      SOCIAL HISTORY:  He has been residing in a group home now for many years and appears to be well cared for.      REVIEW OF SYSTEMS:  Through the group home.     He has had no fevers, no pneumonias, no cardiac issues,  "no issues with blood pressure, no significant issues with  other than needs to be fed through a gastric tube.   :  Is incontinent.     MUSCULOSKELETAL:  No major issues noted.      PHYSICAL EXAMINATION:  Blood pressure 137/72, pulse 64, height 5' 0.98\" (154.9 cm), weight 149 lb 14.4 oz (68 kg).     He is completely wheelchair-bound.  He has a small body size with foreshortened upper and lower extremities consistent with early childhood cerebral palsy.  He has a feeding tube in place.      Attention Span:  Nonresponsive verbally, minimally responsive to strong stimuli.  In wheelchair, he does have roving eye movements and not clear whether he is actually fixating.      Mild spasticity of upper and lower extremities.      ASSESSMENT:  He was  deteriorating.    Adding perampanel has resulted in a significant reduction in sz. We have D/C ed topriamate.     PLAN:     1.  Continue  perampanel 6 mg hs  2. RTC 1 year    Again, thank you for allowing me to participate in the care of your patient.      Sincerely,    Nanda De La Torre MD      "

## 2017-04-21 NOTE — PROGRESS NOTES
"INTERVAL HX: Now on 6 mg perampanel HS and doing very well. Now only approximately 1 sz per week; no increase in sedation.  SEIZURE HISTORY REVIEWED 4/21/17:  Marcus is a man, who has been a [facility] patient for many years.  This is his annual visit for evaluation of seizure control.  He is in a group home.  He is having his baseline number of seizures overall; but the group home feels that, if anything, he has improved somewhat.  They have fairly extensive records of seizures.  He has nonconvulsive seizures, approximately 15-20 per month.  These are very brief jerks.  He also has occasional generalized convulsive seizures, but these actually have not occurred during the last year.     He had his 1st seizure at age 7, but this is on a baseline of very severe issues.  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  He has a G tube in place for many years because of frequent aspiration pneumonias.  He had a stroke in the 1980s and subsequently has been unable to speak.  He has also had hemorrhoids, left ankle fracture and hip surgery.      FAMILY HISTORY:  Includes a sister, who has tuberous sclerosis.        He has been reliant on Diazepam Intensol through the G tube for clusters of seizures.  The diazepam is very helpful, and he has had no emergency room visits in the last year.      SOCIAL HISTORY:  He has been residing in a group home now for many years and appears to be well cared for.      REVIEW OF SYSTEMS:  Through the group home.     He has had no fevers, no pneumonias, no cardiac issues, no issues with blood pressure, no significant issues with  other than needs to be fed through a gastric tube.   :  Is incontinent.     MUSCULOSKELETAL:  No major issues noted.      PHYSICAL EXAMINATION:  Blood pressure 137/72, pulse 64, height 5' 0.98\" (154.9 cm), weight 149 " lb 14.4 oz (68 kg).     He is completely wheelchair-bound.  He has a small body size with foreshortened upper and lower extremities consistent with early childhood cerebral palsy.  He has a feeding tube in place.      Attention Span:  Nonresponsive verbally, minimally responsive to strong stimuli.  In wheelchair, he does have roving eye movements and not clear whether he is actually fixating.      Mild spasticity of upper and lower extremities.      ASSESSMENT:  He was  deteriorating.    Adding perampanel has resulted in a significant reduction in sz. We have D/C ed topriamate.     PLAN:     1.  Continue  perampanel 6 mg hs  2. RTC 1 year

## 2017-04-21 NOTE — MR AVS SNAPSHOT
After Visit Summary   2017    Marcus Cevallos    MRN: [id number]           Patient Information     Date Of Birth          1955        Visit Information        Provider Department      2017 9:00 AM Nanda De La Torre MD [facility]         Follow-ups after your visit        Your next 10 appointments already scheduled     2017  8:30 AM CDT   Return Visit with Sona Kapoor MD   [facility] ([facility])    [id number] [place] [address] [id number] [phone]            Sep 26, 2017  8:30 AM CDT   Return Visit with López Cruz MD   [facility])    [address] [place] [address] [id number] [phone]              Who to contact     Please call your clinic at [phone] to:    Ask questions about your health    Make or cancel appointments    Discuss your medicines    Learn about your test results    Speak to your doctor   If you have compliments or concerns about an experience at your clinic, or if you wish to file a complaint, please contact [facility] Patient Relations at [phone] or email us at [email or web address]         Additional Information About Your Visit        MyChart Information     TransferGo is an electronic gateway that provides easy, online access to your medical records. With TransferGo, you can request a clinic appointment, read your test results, renew a prescription or communicate with your care team.     To sign up for [employer] visit the website at [email or web address]   You will be asked to enter the access code listed below, as well as some personal information. Please follow the directions to create your username and password.     Your access code is: [email or web address]  Expires: 2017  5:03 PM     Your access code will  in 90 days. If you need help or a new code, please contact your [facility]  "[facility] or call [phone] for assistance.        Care EveryWhere ID     This is your Care EveryWhere ID. This could be used by other organizations to access your [facility] medical records  [id number]        Your Vitals Were     Pulse Height BMI (Body Mass Index)             64 5' 0.98\" (154.9 cm) 28.34 kg/m2          Blood Pressure from Last 3 Encounters:   04/21/17 137/72   02/05/17 114/63   12/09/16 127/74    Weight from Last 3 Encounters:   04/21/17 149 lb 14.4 oz (68 kg)   02/05/17 149 lb 8 oz (67.8 kg)   12/09/16 149 lb 9.6 oz (67.9 kg)              Today, you had the following     No orders found for display       Primary Care Provider Office Phone # Fax #    Maria Eugenia C Fili Dowell MD, [phone]       [facility] [address] [facility] [address]  [facility] [address]        Thank you!     Thank you for choosing [facility]  for your care. Our goal is always to provide you with excellent care. Hearing back from our patients is one way we can continue to improve our services. Please take a few minutes to complete the written survey that you may receive in the mail after your visit with us. Thank you!             Your Updated Medication List - Protect others around you: Learn how to safely use, store and throw away your medicines at www.disposemymeds.org.          This list is accurate as of: 4/21/17  9:28 AM.  Always use your most recent med list.                   Brand Name Dispense Instructions for use    ACETAMINOPHEN PO      325 mg by Oral or G tube route as needed for pain       albuterol (2.5 MG/3ML) 0.083% neb solution      as needed       alendronate 70 MG tablet    FOSAMAX    13 tablet    Take 1 tablet every 7 days via G tube. Take with water and without food or other medication for at least 30 minutes.       aspirin 81 MG tablet      Take 1 tablet by mouth daily. (*).       AYR SALINE NASAL GEL NA          bisacodyl 10 MG Suppository    DULCOLAX "     Place 10 mg rectally daily as needed for constipation       CALCIFEROL 8000 UNIT/ML drops   Generic drug:  ergocalciferol     30 mL    12 drops per g tube twice daily       calcium carbonate 1250 (500 CA) MG/5ML Susp suspension     90 days    1 teaspoon down G tube daily       carbamide peroxide 6.5 % otic solution    DEBROX     as needed       diazepam 5 MG/ML (HIGH CONC) solution    DIAZEPAM INTENSOL    30 mL    Give 1 ml buccally prn for a convulsive seizure > 1 min, 3 min of a non-convulsive seizure or for 2nd non convulsive seizure within 2 hrs of his first seizure. May repeat x 1 after 15 min. In 24 hours.       guaiFENesin 400 MG Tabs      Per packet instructions as needed       levETIRAcetam 100 MG/ML solution    KEPPRA    775 mL    GIVE 12.5ML (1250MG) PER G-TUBE TWICE DAILY       levofloxacin 750 MG tablet    LEVAQUIN     Take by mouth daily 1 tab for 5 days - D/C 1.27.17       levothyroxine 50 MCG tablet    SYNTHROID/LEVOTHROID     1 TABLET DAILY       loratadine 10 MG tablet    CLARITIN     Take 10 mg by mouth daily       MIRALAX PO      Take 8.5 mg by mouth daily       NYSTATIN EX      Externally apply topically as needed       order for DME     1 each    Equipment being ordered: Wheel Chair Cushion       PATADAY 0.2 % Soln   Generic drug:  olopatadine HCl      None Entered       perampanel 6 MG tablet    FYCOMPA    31 tablet    1 tablet per G-tube at bedtime       polyvinyl alcohol 1.4 % ophthalmic solution    ARTIFICIAL TEARS    6 mL    Instill one drop into both eyes 3 times a day.       PROCTOSOL HC 2.5 % cream   Generic drug:  hydrocortisone      as needed       REMEDY NUTRASHIELD 1 %   Generic drug:  dimethicone          REPLETE/FIBER Liqd      1 250cc CAN PER GT 4X/DAY WITH 325CC WATER 4X/DAY=5.5 cans total       valproic acid 250 MG/5ML Syrp syrup    DEPAKENE    620 mL    TAKE 10ML (500MG) PER G-TUBE TWICE DAILY.       VITAMIN C PO      Take 500 mg by mouth daily

## 2017-04-24 ENCOUNTER — OFFICE VISIT (OUTPATIENT)
Dept: DERMATOLOGY | Facility: CLINIC | Age: 62
End: 2017-04-24
Payer: MEDICARE

## 2017-04-24 DIAGNOSIS — L24.9 IRRITANT DERMATITIS: Primary | ICD-10-CM

## 2017-04-24 PROCEDURE — 99213 OFFICE O/P EST LOW 20 MIN: CPT | Performed by: DERMATOLOGY

## 2017-04-24 PROCEDURE — 87077 CULTURE AEROBIC IDENTIFY: CPT | Performed by: DERMATOLOGY

## 2017-04-24 PROCEDURE — 87186 SC STD MICRODIL/AGAR DIL: CPT | Performed by: DERMATOLOGY

## 2017-04-24 PROCEDURE — 87070 CULTURE OTHR SPECIMN AEROBIC: CPT | Performed by: DERMATOLOGY

## 2017-04-24 RX ORDER — HYDROCORTISONE VALERATE 2 MG/G
OINTMENT TOPICAL
Qty: 45 G | Refills: 0 | Status: SHIPPED | OUTPATIENT
Start: 2017-04-24 | End: 2018-08-14

## 2017-04-24 NOTE — PROGRESS NOTES
"[facility] Dermatology Note      Dermatology Problem List:  1. Intertrigo, groin  -Current Tx: Bactroban ointment (intiaited 6/13/2016), A&D ointment (initiated 10/16/2015)  -Previous Tx: Hibiclens (initiated 6/13/2016), clindamycin lotion (10/21/2015-6/13/2016), gentamicin ointment (initiated 10/21/2015), ketoconazole cream (10/16/2015-6/13/2016)  -s/p culture showing Klebsiella pneumoniae, Citrobacter freundii complex, Proteus mirabilis, Beta hemolytic Streptococcus group B and S. aureus 10/16/2015  2. Erosion, buttocks, recurred 4/25/17  - skin culture swab, 5% BPO wash q2 days in tub, Westcort ointment BID, Neutroshield/AD ointment, Nystatin powder. Keep off Bactroban.  -culture showing no growth 1/24/2017    Mukul (pronouced \"Chettle\") Adeel  [phone]    Encounter Date: Apr 24, 2017    CC:  Chief Complaint   Patient presents with     Derm Problem     recheck buttock, sores opened up        History of Present Illness:  Mr. Marcus Cevallos is a 62 year old male who presents as a follow up for erosions and intertrigo. The patient is new to me but a patient of Dr. Pastora Peterson. Patient was last seen 3/30/2017 when started on Nystatin cream, continued Neutroshield, and Bactroban was held for Intertrigo. Patient is accompanied by facilities worker Mukul who provides the history. Today, the patient's aid reports the area is really sensative to touch but unless palpated the area doesn't bother him. The pt gets a tub bath every 2 days but with wipe downs daily. He is always in a diaper which they change several times a day and he admits that the area involved is frequently damp. They are currently using Nyastatin powder powder from dryness, Neutroshield or AD ointment as skin protectorant. The aid reports, it was improving and appeared to be slowly resolving but recently it opened up again.  The patient reports no other lesions of concern.     The patient is present with an personal care " assistant today.     Past Medical History:   Patient Active Problem List   Diagnosis     Cerebral palsy (H)     Mental retardation     Sleep apnea     Retinal detachment, left     Seizure disorder (H)     CVA (cerebral vascular accident) (H)     Hypothyroid     Osteopenia     Overweight (BMI 25.0-29.9)     Hypertension goal BP (blood pressure) < 130/80     Hyperlipidemia LDL goal <100     Blepharitis     Vitamin D deficiency disease     History of retinal detachment     Osteoporosis     Past Medical History:   Diagnosis Date     Cataract      Cerebral palsy (H)      CVA (cerebral vascular accident) (H)      HTN (hypertension)      Hypothyroid      Mental retardation      Obesity, unspecified      Retinal detachment, left      Seizure disorder (H)      Sleep apnea      No past surgical history on file.    Social History:  Reviewed and unchanged but kept in chart for clinician convenience  The patient is a vulnerable adult.     Family History:   Reviewed and unchanged but kept in chart for clinician convenience  Not obtained at visit.     Medications:  Current Outpatient Prescriptions   Medication Sig Dispense Refill     levETIRAcetam (KEPPRA) 100 MG/ML solution GIVE 12.5ML (1250MG) PER G-TUBE TWICE DAILY 775 mL 11     perampanel (FYCOMPA) 6 MG tablet 1 tablet per G-tube at bedtime 31 tablet 5     diazepam (DIAZEPAM INTENSOL) 5 MG/ML (HIGH CONC) solution Give 1 ml buccally prn for a convulsive seizure > 1 min, 3 min of a non-convulsive seizure or for 2nd non convulsive seizure within 2 hrs of his first seizure. May repeat x 1 after 15 min. In 24 hours. 30 mL 0     valproic acid (DEPAKENE) 250 MG/5ML SYRP syrup TAKE 10ML (500MG) PER G-TUBE TWICE DAILY. 620 mL 10     levofloxacin (LEVAQUIN) 750 MG tablet Take by mouth daily 1 tab for 5 days - D/C 1.27.17       guaiFENesin 400 MG TABS Per packet instructions as needed       order for DME Equipment being ordered: Wheel Chair Cushion 1 each 1     Ascorbic Acid (VITAMIN C  PO) Take 500 mg by mouth daily       loratadine (CLARITIN) 10 MG tablet Take 10 mg by mouth daily       bisacodyl (DULCOLAX) 10 MG suppository Place 10 mg rectally daily as needed for constipation       ACETAMINOPHEN  mg by Oral or G tube route as needed for pain       polyvinyl alcohol (ARTIFICIAL TEARS) 1.4 % ophthalmic solution Instill one drop into both eyes 3 times a day. 6 mL 12     dimethicone (REMEDY NUTRASHIELD) 1 %        Aloe-Sodium Chloride (AYR SALINE NASAL GEL NA)        alendronate (FOSAMAX) 70 MG tablet Take 1 tablet every 7 days via G tube. Take with water and without food or other medication for at least 30 minutes. 13 tablet 3     ergocalciferol (CALCIFEROL) 8000 UNIT/ML drops 12 drops per g tube twice daily 30 mL      Polyethylene Glycol 3350 (MIRALAX PO) Take 8.5 mg by mouth daily        NYSTATIN EX Externally apply topically as needed        aspirin 81 MG tablet Take 1 tablet by mouth daily. (*).   3     CARBAMIDE PEROXIDE 6.5 % OT SOLN as needed       PROCTOSOL HC 2.5 % RE CREA as needed       PATADAY 0.2 % OP SOLN None Entered       ALBUTEROL SULFATE (2.5 MG/3ML) 0.083% IN NEBU as needed       REPLETE/FIBER OR LIQD 1 250cc CAN PER GT 4X/DAY WITH 325CC WATER 4X/DAY=5.5 cans total  0     LEVOTHYROXINE SODIUM 50 MCG OR TABS 1 TABLET DAILY       CALCIUM CARBONATE 1250 MG/5ML OR SUSP 1 teaspoon down G tube daily 90 days 3     Allergies   Allergen Reactions     No Known Allergies      Review of Systems:  -Skin: As above in HPI. No additional skin concerns.    Physical exam:  There were no vitals taken for this visit.  GEN: This is a well developed, well-nourished male in no acute distress, in a pleasant mood.    SKIN: Focused exam of the buttocks was examined.  -On the left buttock surrounding a crease is a mascerated, red, slightly eroted plaque   -No other lesions of concern on areas examined.     Impression/Plan:  1. Irritant dermatitis with possible secondary infection. Irritation likely  from wetness of urine.     Swab culture performed for bacteria    5% BPO wash with every tub bath for decreasing microbial load.    Hydrocortisone 0.2% Ointment BID for inflammation.    Fine with use of Neutroshield or AD ointment as skin protectorant.     Nystatin powder for dryness.     Photo obtained.     Follow-up in 3-4 weeks, earlier for new or changing lesions.   CC: Dr. Pastora Peterson on close of encounter.      Staff Involved:  Scribe/Staff      Scribe Disclosure:   I, Page Armstrong, am serving as a scribe to document services personally performed by Dr. López Cruz, based on data collection and the provider's statements to me.     Provider Disclosure:   I have reviewed the documentation recorded by the scribe and have edited it as needed. I have personally performed the services documented here and the documentation accurately represents those services and the decisions made by me.     López Cruz MD, MS    Department of Dermatology  [facility]: Phone: [phone], Fax:[phone]  [facility]: Phone: [phone], Fax: [phone]

## 2017-04-24 NOTE — NURSING NOTE
Dermatology Rooming Note    Marcus Cevallos's goals for this visit include:   Chief Complaint   Patient presents with     Derm Problem     recheck buttock, sores opened up        Is a scribe okay for this visit:YES    Are records needed for this visit(If yes, obtain release of information): No     Vitals: There were no vitals taken for this visit.    Referring Provider:  No referring provider defined for this encounter.

## 2017-04-24 NOTE — PATIENT INSTRUCTIONS
-Use Benzoyl peroxide wash every 2 days with tub bath  -Use Westcort ointment twice a day    Start over-the-counter benzoyl peroxide 10% wash on the affected area.  If 10% is too irritating you can use the 5%. ([employer] makes this product. It is available here at the pharmacy or at [employer]). This medication can bleach your towels and clothing.     It is found in a purple tube in the acne aisle.

## 2017-04-24 NOTE — LETTER
"4/24/2017       RE: Marcus Cevallos  [address] [id number]     Dear Colleague,    Thank you for referring your patient, Marcus Cevallos, to the [facility] acute [facility]. Please see a copy of my visit note below.    [facility] Dermatology Note      Dermatology Problem List:  1. Intertrigo, groin  -Current Tx: Bactroban ointment (intiaited 6/13/2016), A&D ointment (initiated 10/16/2015)  -Previous Tx: Hibiclens (initiated 6/13/2016), clindamycin lotion (10/21/2015-6/13/2016), gentamicin ointment (initiated 10/21/2015), ketoconazole cream (10/16/2015-6/13/2016)  -s/p culture showing Klebsiella pneumoniae, Citrobacter freundii complex, Proteus mirabilis, Beta hemolytic Streptococcus group B and S. aureus 10/16/2015  2. Erosion, buttocks, recurred 4/25/17  - skin culture swab, 5% BPO wash q2 days in tub, Westcort ointment BID, Neutroshield/AD ointment, Nystatin powder. Keep off Bactroban.  -culture showing no growth 1/24/2017    Mukul (pronouced \"Chettle\") Adeel  [phone]    Encounter Date: Apr 24, 2017    CC:  Chief Complaint   Patient presents with     Derm Problem     recheck buttock, sores opened up        History of Present Illness:  Mr. Marcus Cevallos is a 62 year old male who presents as a follow up for erosions and intertrigo. The patient is new to me but a patient of Dr. Pastora Peterson. Patient was last seen 3/30/2017 when started on Nystatin cream, continued Neutroshield, and Bactroban was held for Intertrigo. Patient is accompanied by facilities worker Mukul who provides the history. Today, the patient's aid reports the area is really sensative to touch but unless palpated the area doesn't bother him. The pt gets a tub bath every 2 days but with wipe downs daily. He is always in a diaper which they change several times a day and he admits that the area involved is frequently damp. They are " currently using Nyastatin powder powder from dryness, Neutroshield or AD ointment as skin protectorant. The aid reports, it was improving and appeared to be slowly resolving but recently it opened up again.  The patient reports no other lesions of concern.     The patient is present with an personal care assistant today.     Past Medical History:   Patient Active Problem List   Diagnosis     Cerebral palsy (H)     Mental retardation     Sleep apnea     Retinal detachment, left     Seizure disorder (H)     CVA (cerebral vascular accident) (H)     Hypothyroid     Osteopenia     Overweight (BMI 25.0-29.9)     Hypertension goal BP (blood pressure) < 130/80     Hyperlipidemia LDL goal <100     Blepharitis     Vitamin D deficiency disease     History of retinal detachment     Osteoporosis     Past Medical History:   Diagnosis Date     Cataract      Cerebral palsy (H)      CVA (cerebral vascular accident) (H)      HTN (hypertension)      Hypothyroid      Mental retardation      Obesity, unspecified      Retinal detachment, left      Seizure disorder (H)      Sleep apnea      No past surgical history on file.    Social History:  Reviewed and unchanged but kept in chart for clinician convenience  The patient is a vulnerable adult.     Family History:   Reviewed and unchanged but kept in chart for clinician convenience  Not obtained at visit.     Medications:  Current Outpatient Prescriptions   Medication Sig Dispense Refill     levETIRAcetam (KEPPRA) 100 MG/ML solution GIVE 12.5ML (1250MG) PER G-TUBE TWICE DAILY 775 mL 11     perampanel (FYCOMPA) 6 MG tablet 1 tablet per G-tube at bedtime 31 tablet 5     diazepam (DIAZEPAM INTENSOL) 5 MG/ML (HIGH CONC) solution Give 1 ml buccally prn for a convulsive seizure > 1 min, 3 min of a non-convulsive seizure or for 2nd non convulsive seizure within 2 hrs of his first seizure. May repeat x 1 after 15 min. In 24 hours. 30 mL 0     valproic acid (DEPAKENE) 250 MG/5ML SYRP syrup TAKE  10ML (500MG) PER G-TUBE TWICE DAILY. 620 mL 10     levofloxacin (LEVAQUIN) 750 MG tablet Take by mouth daily 1 tab for 5 days - D/C 1.27.17       guaiFENesin 400 MG TABS Per packet instructions as needed       order for DME Equipment being ordered: Wheel Chair Cushion 1 each 1     Ascorbic Acid (VITAMIN C PO) Take 500 mg by mouth daily       loratadine (CLARITIN) 10 MG tablet Take 10 mg by mouth daily       bisacodyl (DULCOLAX) 10 MG suppository Place 10 mg rectally daily as needed for constipation       ACETAMINOPHEN  mg by Oral or G tube route as needed for pain       polyvinyl alcohol (ARTIFICIAL TEARS) 1.4 % ophthalmic solution Instill one drop into both eyes 3 times a day. 6 mL 12     dimethicone (REMEDY NUTRASHIELD) 1 %        Aloe-Sodium Chloride (AYR SALINE NASAL GEL NA)        alendronate (FOSAMAX) 70 MG tablet Take 1 tablet every 7 days via G tube. Take with water and without food or other medication for at least 30 minutes. 13 tablet 3     ergocalciferol (CALCIFEROL) 8000 UNIT/ML drops 12 drops per g tube twice daily 30 mL      Polyethylene Glycol 3350 (MIRALAX PO) Take 8.5 mg by mouth daily        NYSTATIN EX Externally apply topically as needed        aspirin 81 MG tablet Take 1 tablet by mouth daily. (*).   3     CARBAMIDE PEROXIDE 6.5 % OT SOLN as needed       PROCTOSOL HC 2.5 % RE CREA as needed       PATADAY 0.2 % OP SOLN None Entered       ALBUTEROL SULFATE (2.5 MG/3ML) 0.083% IN NEBU as needed       REPLETE/FIBER OR LIQD 1 250cc CAN PER GT 4X/DAY WITH 325CC WATER 4X/DAY=5.5 cans total  0     LEVOTHYROXINE SODIUM 50 MCG OR TABS 1 TABLET DAILY       CALCIUM CARBONATE 1250 MG/5ML OR SUSP 1 teaspoon down G tube daily 90 days 3     Allergies   Allergen Reactions     No Known Allergies      Review of Systems:  -Skin: As above in HPI. No additional skin concerns.    Physical exam:  There were no vitals taken for this visit.  GEN: This is a well developed, well-nourished male in no acute distress,  in a pleasant mood.    SKIN: Focused exam of the buttocks was examined.  -On the left buttock surrounding a crease is a mascerated, red, slightly eroted plaque   -No other lesions of concern on areas examined.     Impression/Plan:  1. Irritant dermatitis with possible secondary infection. Irritation likely from wetness of urine.     Swab culture performed for bacteria    5% BPO wash with every tub bath for decreasing microbial load.    Hydrocortisone 0.2% Ointment BID for inflammation.    Fine with use of Neutroshield or AD ointment as skin protectorant.     Nystatin powder for dryness.     Photo obtained.     Follow-up in 3-4 weeks, earlier for new or changing lesions.   CC: Dr. Pastora Peterson on close of encounter.      Staff Involved:  Scribe/Staff      Scribe Disclosure:   I, Page Armstrong, am serving as a scribe to document services personally performed by Dr. López Cruz, based on data collection and the provider's statements to me.     Provider Disclosure:   I have reviewed the documentation recorded by the scribe and have edited it as needed. I have personally performed the services documented here and the documentation accurately represents those services and the decisions made by me.     López Cruz MD, MS    Department of Dermatology  [facility]: Phone: [phone], Fax:[phone]  [facility]: Phone: [phone], Fax: [phone]

## 2017-04-24 NOTE — MR AVS SNAPSHOT
After Visit Summary   4/24/2017    Marcus Cevallos    MRN: [id number]           Patient Information     Date Of Birth          1955        Visit Information        Provider Department      4/24/2017 4:30 PM López Cruz MD [facility]        Today's Diagnoses     Rash    -  1      Care Instructions    -Use Benzoyl peroxide wash every 2 days with tub bath  -Use Westcort ointment twice a day    Start over-the-counter benzoyl peroxide 10% wash on the affected area.  If 10% is too irritating you can use the 5%. ([employer] makes this product. It is available here at the pharmacy or at [employer]). This medication can bleach your towels and clothing.     It is found in a purple tube in the acne aisle.                   Follow-ups after your visit        Your next 10 appointments already scheduled     May 22, 2017  1:15 PM CDT   [facility] Visit with López Cruz MD   [facility]    [address] [place] [address] [id number] [phone]            Jun 30, 2017  8:30 AM CDT   Return Visit with Sona Kapoor MD   [facility]    [address] [place] [address] [id number] [phone]            Sep 26, 2017  8:30 AM CDT   Return Visit with López Cruz MD   [facility]    [address] [place] [address] [id number] [phone]              Who to contact     If you have questions or need follow up information about today's clinic visit or your schedule please contact [facility] directly at [phone].  Normal or non-critical lab and imaging results will be communicated to you by MyChart, letter or phone within 4 business days after the clinic has received the results. If you do not hear from us within 7 days, please contact the clinic through MyChart or phone. If you have a critical or abnormal lab  result, we will notify you by phone as soon as possible.  Submit refill requests through [employer] or call your pharmacy and they will forward the refill request to us. Please allow 3 business days for your refill to be completed.          Additional Information About Your Visit        [employer] Information     [employer] is an electronic gateway that provides easy, online access to your medical records. With [employer], you can request a clinic appointment, read your test results, renew a prescription or communicate with your care team.     To sign up for [employer] visit the website at [email or web address]   You will be asked to enter the access code listed below, as well as some personal information. Please follow the directions to create your username and password.     Your access code is: [email or web address]  Expires: 2017  5:03 PM     Your access code will  in 90 days. If you need help or a new code, please contact your [facility] or call [phone] for assistance.        Care EveryWhere ID     This is your Care EveryWhere ID. This could be used by other organizations to access your [place] medical records  [id number]         Blood Pressure from Last 3 Encounters:   17 137/72   17 114/63   16 127/74    Weight from Last 3 Encounters:   17 68 kg (149 lb 14.4 oz)   17 67.8 kg (149 lb 8 oz)   16 67.9 kg (149 lb 9.6 oz)              We Performed the Following     Skin Culture Aerobic Bacterial          Today's Medication Changes          These changes are accurate as of: 17  4:46 PM.  If you have any questions, ask your nurse or doctor.               Start taking these medicines.        Dose/Directions    benzoyl peroxide 5 % Liqd   Used for:  Rash        Use as an anti-microbial wash in the tub every 2 days.   Quantity:  140 g   Refills:  11       hydrocortisone valerate 0.2 % ointment   Commonly known as:  WEST-DONA   Used for:  Rash         Apply to the area once a day.   Quantity:  45 g   Refills:  0            Where to get your medicines      These medications were sent to [facility] - [place], [place] - [address]  [address] [facility] [address]     Phone:  [phone]     benzoyl peroxide 5 % Liqd    hydrocortisone valerate 0.2 % ointment                Primary Care Provider Office Phone # Fax #    Maria Eugenia C Fili Dowell MD, [phone]       [facility] [address]  [facility] [address]        Thank you!     Thank you for choosing [facility]  for your care. Our goal is always to provide you with excellent care. Hearing back from our patients is one way we can continue to improve our services. Please take a few minutes to complete the written survey that you may receive in the mail after your visit with us. Thank you!             Your Updated Medication List - Protect others around you: Learn how to safely use, store and throw away your medicines at www.disposemymeds.org.          This list is accurate as of: 4/24/17  4:46 PM.  Always use your most recent med list.                   Brand Name Dispense Instructions for use    ACETAMINOPHEN PO      325 mg by Oral or G tube route as needed for pain       albuterol (2.5 MG/3ML) 0.083% neb solution      as needed       alendronate 70 MG tablet    FOSAMAX    13 tablet    Take 1 tablet every 7 days via G tube. Take with water and without food or other medication for at least 30 minutes.       aspirin 81 MG tablet      Take 1 tablet by mouth daily. (*).       AYR SALINE NASAL GEL NA          benzoyl peroxide 5 % Liqd     140 g    Use as an anti-microbial wash in the tub every 2 days.       bisacodyl 10 MG Suppository    DULCOLAX     Place 10 mg rectally daily as needed for constipation       CALCIFEROL 8000 UNIT/ML drops   Generic drug:  ergocalciferol     30 mL    12 drops per g tube twice daily       calcium  carbonate 1250 (500 CA) MG/5ML Susp suspension     90 days    1 teaspoon down G tube daily       carbamide peroxide 6.5 % otic solution    DEBROX     as needed       diazepam 5 MG/ML (HIGH CONC) solution    DIAZEPAM INTENSOL    30 mL    Give 1 ml buccally prn for a convulsive seizure > 1 min, 3 min of a non-convulsive seizure or for 2nd non convulsive seizure within 2 hrs of his first seizure. May repeat x 1 after 15 min. In 24 hours.       guaiFENesin 400 MG Tabs      Per packet instructions as needed       hydrocortisone valerate 0.2 % ointment    WESTDONA    45 g    Apply to the area once a day.       levETIRAcetam 100 MG/ML solution    KEPPRA    775 mL    GIVE 12.5ML (1250MG) PER G-TUBE TWICE DAILY       levofloxacin 750 MG tablet    LEVAQUIN     Take by mouth daily 1 tab for 5 days - D/C 1.27.17       levothyroxine 50 MCG tablet    SYNTHROID/LEVOTHROID     1 TABLET DAILY       loratadine 10 MG tablet    CLARITIN     Take 10 mg by mouth daily       MIRALAX PO      Take 8.5 mg by mouth daily       NYSTATIN EX      Externally apply topically as needed       order for DME     1 each    Equipment being ordered: Wheel Chair Cushion       PATADAY 0.2 % Soln   Generic drug:  olopatadine HCl      None Entered       perampanel 6 MG tablet    FYCOMPA    31 tablet    1 tablet per G-tube at bedtime       polyvinyl alcohol 1.4 % ophthalmic solution    ARTIFICIAL TEARS    6 mL    Instill one drop into both eyes 3 times a day.       PROCTOSOL HC 2.5 % cream   Generic drug:  hydrocortisone      as needed       REMEDY NUTRASHIELD 1 %   Generic drug:  dimethicone          REPLETE/FIBER Liqd      1 250cc CAN PER GT 4X/DAY WITH 325CC WATER 4X/DAY=5.5 cans total       valproic acid 250 MG/5ML Syrp syrup    DEPAKENE    620 mL    TAKE 10ML (500MG) PER G-TUBE TWICE DAILY.       VITAMIN C PO      Take 500 mg by mouth daily

## 2017-04-25 ENCOUNTER — TELEPHONE (OUTPATIENT)
Dept: DERMATOLOGY | Facility: CLINIC | Age: 62
End: 2017-04-25

## 2017-04-25 NOTE — TELEPHONE ENCOUNTER
Prior authorization needed for hydrocortisone valerate ointment 0.2%.  Forwarding to PA team.    Nat Myers RN

## 2017-04-27 ENCOUNTER — TELEPHONE (OUTPATIENT)
Dept: DERMATOLOGY | Facility: CLINIC | Age: 62
End: 2017-04-27

## 2017-04-27 DIAGNOSIS — Z22.322 MRSA (METHICILLIN RESISTANT STAPH AUREUS) CULTURE POSITIVE: Primary | ICD-10-CM

## 2017-04-27 NOTE — PROGRESS NOTES
"Please contact pt's care worker and fax over a copy of the culture report.  The number to the aid that was in clinic with him this past appointment is below.  Mukul (pronouced \"Chettle\") Adeel  [phone]      Patient has a skin infection with MRSA and 2 other gram negative bacteria.    Topically pt should have Gentamicin ointment applied twice a day in addition to the Westcort ointment. [facility]    Also, he will likely need an oral antibiotic. I do not know if he can swallow pills or solutions. I can leave it up to the facility doctor to order the oral medication since they know him better.   My tendency would be to use Bactrim SS twice a day for 10-14 days.    Please continue BPO wash.    Please let me know what the facility wants me to order.    López Cruz MD, MS    Department of Dermatology  [facility]: Phone: [phone], Fax:[phone]  [facility]: Phone: [phone], Fax: [phone]  "

## 2017-04-27 NOTE — TELEPHONE ENCOUNTER
"I left a message for Mukul to call [facility].  Per result note:    Notes Recorded by López Cruz MD on 4/27/2017 at 7:40 AM  Please contact pt's care worker and fax over a copy of the culture report.  The number to the aid that was in clinic with him this past appointment is below.  Mukul (pronouced \"Chettle\") dAeel  [phone]      Patient has a skin infection with MRSA and 2 other gram negative bacteria.    Topically pt should have Gentamicin ointment applied twice a day in addition to the Westcort ointment. I can order this medicine through [facility].    Also, he will likely need an oral antibiotic. I do not know if he can swallow pills or solutions. I can leave it up to the facility doctor to order the oral medication since they know him better.   My tendency would be to use Bactrim SS twice a day for 10-14 days.    Please continue BPO wash.    Please let me know what the facility wants me to order.    López Cruz MD, MS    Department of Dermatology  [facility]: Phone: [phone], Fax:[phone]  [facility]: Phone: [phone], Fax: [phone]          3d ago   Resulting Agency     Specimen Description Left Buttock MG     Culture Micro Light growth Proteus mirabilis   Light growth Morganella (Proteus) morganii   Moderate growth Methicillin resistant Staphylococcus aureus (MRSA)   Moderate growth Beta hemolytic Streptococcus group B Susceptibility testing in    progress   Moderate growth Normal skin rahel                   Nat Myers RN    "

## 2017-04-27 NOTE — TELEPHONE ENCOUNTER
Rn called due to not receiving the order and prescription. Dr. chapa hasn't sent the order yet. She stated he can send the prescription to the [facility] in [facility] so patient can have it tomorrow.     Amorrae EVAN Hernández

## 2017-04-28 RX ORDER — GENTAMICIN SULFATE 1 MG/G
OINTMENT TOPICAL
Qty: 30 G | Refills: 0 | Status: SHIPPED | OUTPATIENT
Start: 2017-04-28 | End: 2017-06-30

## 2017-04-28 NOTE — TELEPHONE ENCOUNTER
"Son called and states that [facility] called them and states that they did not receive the oral abx or the ointment.   Son states that they would like the medications sent to [facility] in [facility]. According to previous note below Nurse Ronna with Group home where pt resides requested that the abx be ordered by Dr. Cruz in liquid form. Will route to Dr. Cruz to please order medications and sent [facility] in [facility] per son's request. Thank you.....Hali Flores RN              Notes Recorded by López Cruz MD on 4/27/2017 at 7:40 AM  Please contact pt's care worker and fax over a copy of the culture report.  The number to the aid that was in clinic with him this past appointment is below.  Mukul (pronouced \"Chettle\") [facility]  [phone]      Patient has a skin infection with MRSA and 2 other gram negative bacteria.    Topically pt should have Gentamicin ointment applied twice a day in addition to the Westcort ointment. [facility].    Also, he will likely need an oral antibiotic. I do not know if he can swallow pills or solutions. I can leave it up to the facility doctor to order the oral medication since they know him better.   My tendency would be to use Bactrim SS twice a day for 10-14 days.    Please continue BPO wash.    Please let me know what the facility wants me to order.    Lópze Cruz MD, MS    Department of Dermatology  [facility]: Phone: [phone], Fax:[phone]  [facility]: Phone: [phone], Fax: [phone]            "

## 2017-04-28 NOTE — TELEPHONE ENCOUNTER
Dr. Cruz called and notified of son's message below. Dr. Cruz states that he is okay with RN ordering the gentamycin ointment but that he would prefer pt's facility consult with their staff provider in regards to the oral abx. RN called care team and notified staff of Dr. Cruz's message. Care team staff states understanding and will consult with pt's primary physician on oral abx....Hali Flores RN

## 2017-05-02 NOTE — TELEPHONE ENCOUNTER
PA Initiation    Medication: Hydrocortisone-shaun ointment  Insurance Company: [employer] Part D - Phone [phone] Fax [phone]  [facility] the [facility] - [place], [place] - [address]  Filling Pharmacy Phone: [phone]  Filling Pharmacy Fax:    Start Date: 5/2/2017

## 2017-05-03 NOTE — TELEPHONE ENCOUNTER
Patient stopped in yesterday evening looking to see where this was and if it was done yet. He stated he has been waiting for awhile and was upset it had not been completed yet. The PA was just sent yesterday, correct? Could someone let the patient know some sort of update on this?     Thank you,     Danielle Helm

## 2017-05-03 NOTE — TELEPHONE ENCOUNTER
Prior Authorization Approval    Authorization Effective Date: 2/1/2017  Authorization Expiration Date: 5/2/2018  Medication: Hydrocortisone-shaun ointment - APPROVED  Approved Dose/Quantity:   Reference #:    Insurance Company: [employer] Part D - Phone [phone] Fax [phone]  Expected CoPay: $0.00     CoPay Card Available:      Foundation Assistance Needed:    Which Pharmacy is filling the prescription (Not needed for infusion/clinic administered): [facility] - [place] [place] - [address]  Pharmacy Notified: Yes  Patient Notified: No Comment:  Pharmacy was sending out the RX today

## 2017-05-08 ENCOUNTER — TRANSFERRED RECORDS (OUTPATIENT)
Dept: HEALTH INFORMATION MANAGEMENT | Facility: CLINIC | Age: 62
End: 2017-05-08

## 2017-05-22 ENCOUNTER — OFFICE VISIT (OUTPATIENT)
Dept: DERMATOLOGY | Facility: CLINIC | Age: 62
End: 2017-05-22
Payer: MEDICARE

## 2017-05-22 DIAGNOSIS — L24.9 IRRITANT DERMATITIS: ICD-10-CM

## 2017-05-22 DIAGNOSIS — L08.9 SKIN INFECTION: ICD-10-CM

## 2017-05-22 DIAGNOSIS — L89.91 PRESSURE ULCER, STAGE I: Primary | ICD-10-CM

## 2017-05-22 PROCEDURE — 99212 OFFICE O/P EST SF 10 MIN: CPT | Performed by: DERMATOLOGY

## 2017-05-22 ASSESSMENT — PAIN SCALES - GENERAL: PAINLEVEL: NO PAIN (0)

## 2017-05-22 NOTE — NURSING NOTE
Dermatology Rooming Note    Marcus Cevallos's goals for this visit include:   Chief Complaint   Patient presents with     RECHECK     buttock        Is a scribe okay for this visit:YES    Are records needed for this visit(If yes, obtain release of information): No     Vitals: There were no vitals taken for this visit.    Referring Provider:  No referring provider defined for this encounter.

## 2017-05-22 NOTE — PROGRESS NOTES
[facility] Dermatology Note      Dermatology Problem List:  1. Intertrigo, groin  -Current Tx: Bactroban ointment (intiaited 6/13/2016), A&D ointment (initiated 10/16/2015)  -Previous Tx: Hibiclens (initiated 6/13/2016), clindamycin lotion (10/21/2015-6/13/2016), gentamicin ointment (initiated 10/21/2015), ketoconazole cream (10/16/2015-6/13/2016)  -s/p culture showing Klebsiella pneumoniae, Citrobacter freundii complex, Proteus mirabilis, Beta hemolytic Streptococcus group B and S. aureus 10/16/2015  2. Pressure Ulcer stage I-II: L posterior inguinal fold with freq superinfection and irritation from urine/stool.   -s/p swab 4/24/2017, MRSA and 2 other gram negative bacteria  -Current Tx: 5% BP wash, Neutroshield or AD ointment, Nystatin powder,      Encounter Date: May 22, 2017    CC:  Chief Complaint   Patient presents with     RECHECK     buttock        History of Present Illness:  Mr. Marcus Cevallos is a 62 year old male who presents as a follow up for irritant dermatitis. The patient was last seen 4/24/2017 when a culture was performed, hydrocortisone ointment, BP wash and Nystatin were continued. Today, the patient's aid reports that he was on a 10 day antibiotic (Bactrim). The affected area is improved since last visit. They are using BP wash, AD Ointment, Gentalycin and hydrocortisone on the area. The patient's aid reports no other lesions of concern.    The patient is present with an aid today.     Past Medical History:   Patient Active Problem List   Diagnosis     Cerebral palsy (H)     Mental retardation     Sleep apnea     Retinal detachment, left     Seizure disorder (H)     CVA (cerebral vascular accident) (H)     Hypothyroid     Osteopenia     Overweight (BMI 25.0-29.9)     Hypertension goal BP (blood pressure) < 130/80     Hyperlipidemia LDL goal <100     Blepharitis     Vitamin D deficiency disease     History of retinal detachment     Osteoporosis     Past Medical History:    Diagnosis Date     Cataract      Cerebral palsy (H)      CVA (cerebral vascular accident) (H)      HTN (hypertension)      Hypothyroid      Mental retardation      Obesity, unspecified      Retinal detachment, left      Seizure disorder (H)      Sleep apnea      No past surgical history on file.    Social History:  Reviewed and unchanged but kept in chart for clinician convenience  The patient is a vulnerable adult. He is in a care home.     Family History:   Not obtained at visit.     Medications:  Current Outpatient Prescriptions   Medication Sig Dispense Refill     gentamicin (GARAMYCIN) 0.1 % ointment Apply topically 2 times daily 30 g 0     benzoyl peroxide 5 % LIQD Use as an anti-microbial wash in the tub every 2 days. 140 g 11     hydrocortisone valerate (WEST-DONA) 0.2 % ointment Apply to the area once a day. 45 g 0     levETIRAcetam (KEPPRA) 100 MG/ML solution GIVE 12.5ML (1250MG) PER G-TUBE TWICE DAILY 775 mL 11     perampanel (FYCOMPA) 6 MG tablet 1 tablet per G-tube at bedtime 31 tablet 5     diazepam (DIAZEPAM INTENSOL) 5 MG/ML (HIGH CONC) solution Give 1 ml buccally prn for a convulsive seizure > 1 min, 3 min of a non-convulsive seizure or for 2nd non convulsive seizure within 2 hrs of his first seizure. May repeat x 1 after 15 min. In 24 hours. 30 mL 0     valproic acid (DEPAKENE) 250 MG/5ML SYRP syrup TAKE 10ML (500MG) PER G-TUBE TWICE DAILY. 620 mL 10     levofloxacin (LEVAQUIN) 750 MG tablet Take by mouth daily 1 tab for 5 days - D/C 1.27.17       guaiFENesin 400 MG TABS Per packet instructions as needed       order for DME Equipment being ordered: Wheel Chair Cushion 1 each 1     Ascorbic Acid (VITAMIN C PO) Take 500 mg by mouth daily       loratadine (CLARITIN) 10 MG tablet Take 10 mg by mouth daily       bisacodyl (DULCOLAX) 10 MG suppository Place 10 mg rectally daily as needed for constipation       ACETAMINOPHEN  mg by Oral or G tube route as needed for pain       polyvinyl alcohol  (ARTIFICIAL TEARS) 1.4 % ophthalmic solution Instill one drop into both eyes 3 times a day. 6 mL 12     dimethicone (REMEDY NUTRASHIELD) 1 %        Aloe-Sodium Chloride (AYR SALINE NASAL GEL NA)        alendronate (FOSAMAX) 70 MG tablet Take 1 tablet every 7 days via G tube. Take with water and without food or other medication for at least 30 minutes. 13 tablet 3     ergocalciferol (CALCIFEROL) 8000 UNIT/ML drops 12 drops per g tube twice daily 30 mL      Polyethylene Glycol 3350 (MIRALAX PO) Take 8.5 mg by mouth daily        NYSTATIN EX Externally apply topically as needed        aspirin 81 MG tablet Take 1 tablet by mouth daily. (*).   3     CARBAMIDE PEROXIDE 6.5 % OT SOLN as needed       PROCTOSOL HC 2.5 % RE CREA as needed       PATADAY 0.2 % OP SOLN None Entered       ALBUTEROL SULFATE (2.5 MG/3ML) 0.083% IN NEBU as needed       REPLETE/FIBER OR LIQD 1 250cc CAN PER GT 4X/DAY WITH 325CC WATER 4X/DAY=5.5 cans total  0     LEVOTHYROXINE SODIUM 50 MCG OR TABS 1 TABLET DAILY       CALCIUM CARBONATE 1250 MG/5ML OR SUSP 1 teaspoon down G tube daily 90 days 3     Allergies   Allergen Reactions     No Known Allergies      Review of Systems:  -Skin: As above in HPI. No additional skin concerns.    Physical exam:  There were no vitals taken for this visit.  GEN: This is a well developed, well-nourished male in no acute distress, in a pleasant mood.    SKIN: Focused examination of the buttocks was performed.  -Stage 1-2 pressure ulcer without evidence of infection with one small erosion on the left posterior gluteal fold.   -No other lesions of concern on areas examined.     Impression/Plan:  1. Pressure ulcer Stage 1-2 with resolved superinfection and and freq recurrence of irritant dermatitis from urine and stool. In good condition today.     Hold Westcort 0.2% ointment and Gentamycin ointment.     Continue 5% BPO wash during every tub bath to prevent infections.     Continue AD ointment or Vaselineas skin  protectorant.     Continue Mepilex padding to the area.     Vaseline and Duoderm placed by nursing staff today. No mepilex in clinic.    Follow-up in 1-2 months, earlier for new or changing lesions.   CC: Dr. Pastora Peterson on close of encounter.    Staff Involved:  Scribe/Staff    Scribe Disclosure:   I, Emelyn Rodriguez, am serving as a scribe to document services personally performed by Dr. López Cruz, based on data collection and the provider's statements to me.    Provider Disclosure:   I have reviewed the documentation recorded by the scribe and have edited it as needed. I have personally performed the services documented here and the documentation accurately represents those services and the decisions made by me.     López Cruz MD, MS    Department of Dermatology  [facility]: Phone: [phone], Fax:[phone]  [facility]: Phone: [phone], Fax: [phone]

## 2017-05-22 NOTE — MR AVS SNAPSHOT
After Visit Summary   5/22/2017    Marcus Cevallos    MRN: [id number]           Patient Information     Date Of Birth          1955        Visit Information        Provider Department      5/22/2017 1:15 PM López Cruz MD [facility]        Today's Diagnoses     Pressure ulcer, stage I    -  1    Skin infection        Irritant dermatitis          Care Instructions    Patient have a pressure ulcer stage 1-2 with impact from urine, wetness, stool and frequent infections. Infection is cleared but to keep clean, padded, and off-load pressure.    Hold Westcort. No inflammation.  Hold gentamycin. No inflection  Continue AD Ointment daily as skin protectorant  Continue Benzoyl Peroxide treatment to every bath  Continue Mepilex dressing.    We have applied a Duoderm because we dontt have mepilex in the clinic. It can get wet and remove just with the next bath.         Follow-ups after your visit        Your next 10 appointments already scheduled     Jun 30, 2017  8:30 AM CDT   [facility] Visit with Sona Kapoor MD   [facility]    [address] [place] [id number] [phone]            Sep 26, 2017  8:30 AM CDT   Return Visit with López Cruz MD   [facility]    [address] [place] [id number] [phone]              Who to contact     If you have questions or need follow up information about today's clinic visit or your schedule please contact [facility] directly at [phone].  Normal or non-critical lab and imaging results will be communicated to you by MyChart, letter or phone within 4 business days after the clinic has received the results. If you do not hear from us within 7 days, please contact the clinic through MyChart or phone. If you have a critical or abnormal lab result, we will notify you by phone as soon as  possible.  Submit refill requests through [employer] or call your pharmacy and they will forward the refill request to us. Please allow 3 business days for your refill to be completed.          Additional Information About Your Visit        [employer] Information     [employer] is an electronic gateway that provides easy, online access to your medical records. With [employer], you can request a clinic appointment, read your test results, renew a prescription or communicate with your care team.     To sign up for [employer] visit the website at [email or web address]   You will be asked to enter the access code listed below, as well as some personal information. Please follow the directions to create your username and password.     Your access code is: [email or web address]  Expires: 2017  1:50 PM     Your access code will  in 90 days. If you need help or a new code, please contact your [facility] or call [phone] for assistance.        Care EveryWhere ID     This is your Care EveryWhere ID. This could be used by other organizations to access your [place] medical records  [id number]         Blood Pressure from Last 3 Encounters:   17 137/72   17 114/63   16 127/74    Weight from Last 3 Encounters:   17 68 kg (149 lb 14.4 oz)   17 67.8 kg (149 lb 8 oz)   16 67.9 kg (149 lb 9.6 oz)              Today, you had the following     No orders found for display       Primary Care Provider Office Phone # Fax #    Maria Eugenia WALTER Dowell MD, [phone]       [facility] [address] [address]        Thank you!     Thank you for choosing [facility]  for your care. Our goal is always to provide you with excellent care. Hearing back from our patients is one way we can continue to improve our services. Please take a few minutes to complete the written survey that you may receive in the mail after  your visit with us. Thank you!             Your Updated Medication List - Protect others around you: Learn how to safely use, store and throw away your medicines at www.disposemymeds.org.          This list is accurate as of: 5/22/17  1:50 PM.  Always use your most recent med list.                   Brand Name Dispense Instructions for use    ACETAMINOPHEN PO      325 mg by Oral or G tube route as needed for pain       albuterol (2.5 MG/3ML) 0.083% neb solution      as needed       alendronate 70 MG tablet    FOSAMAX    13 tablet    Take 1 tablet every 7 days via G tube. Take with water and without food or other medication for at least 30 minutes.       aspirin 81 MG tablet      Take 1 tablet by mouth daily. (*).       AYR SALINE NASAL GEL NA          benzoyl peroxide 5 % Liqd     140 g    Use as an anti-microbial wash in the tub every 2 days.       bisacodyl 10 MG Suppository    DULCOLAX     Place 10 mg rectally daily as needed for constipation       CALCIFEROL 8000 UNIT/ML drops   Generic drug:  ergocalciferol     30 mL    12 drops per g tube twice daily       calcium carbonate 1250 (500 CA) MG/5ML Susp suspension     90 days    1 teaspoon down G tube daily       carbamide peroxide 6.5 % otic solution    DEBROX     as needed       diazepam 5 MG/ML (HIGH CONC) solution    DIAZEPAM INTENSOL    30 mL    Give 1 ml buccally prn for a convulsive seizure > 1 min, 3 min of a non-convulsive seizure or for 2nd non convulsive seizure within 2 hrs of his first seizure. May repeat x 1 after 15 min. In 24 hours.       gentamicin 0.1 % ointment    GARAMYCIN    30 g    Apply topically 2 times daily       guaiFENesin 400 MG Tabs      Per packet instructions as needed       hydrocortisone valerate 0.2 % ointment    WEST-DONA    45 g    Apply to the area once a day.       levETIRAcetam 100 MG/ML solution    KEPPRA    775 mL    GIVE 12.5ML (1250MG) PER G-TUBE TWICE DAILY       levofloxacin 750 MG tablet    LEVAQUIN     Take by mouth  daily 1 tab for 5 days - D/C 1.27.17       levothyroxine 50 MCG tablet    SYNTHROID/LEVOTHROID     1 TABLET DAILY       loratadine 10 MG tablet    CLARITIN     Take 10 mg by mouth daily       MIRALAX PO      Take 8.5 mg by mouth daily       NYSTATIN EX      Externally apply topically as needed       order for DME     1 each    Equipment being ordered: Wheel Chair Cushion       PATADAY 0.2 % Soln   Generic drug:  olopatadine HCl      None Entered       perampanel 6 MG tablet    FYCOMPA    31 tablet    1 tablet per G-tube at bedtime       polyvinyl alcohol 1.4 % ophthalmic solution    ARTIFICIAL TEARS    6 mL    Instill one drop into both eyes 3 times a day.       PROCTOSOL HC 2.5 % cream   Generic drug:  hydrocortisone      as needed       REMEDY NUTRASHIELD 1 %   Generic drug:  dimethicone          REPLETE/FIBER Liqd      1 250cc CAN PER GT 4X/DAY WITH 325CC WATER 4X/DAY=5.5 cans total       valproic acid 250 MG/5ML Syrp syrup    DEPAKENE    620 mL    TAKE 10ML (500MG) PER G-TUBE TWICE DAILY.       VITAMIN C PO      Take 500 mg by mouth daily

## 2017-05-22 NOTE — LETTER
5/22/2017       RE: Marcus Cevallos  [address] [facility] [address]     Dear Colleague,    Thank you for referring your patient, Marcus Cevallos, to the [facility]. Please see a copy of my visit note below.    [facility] Dermatology Note      Dermatology Problem List:  1. Intertrigo, groin  -Current Tx: Bactroban ointment (intiaited 6/13/2016), A&D ointment (initiated 10/16/2015)  -Previous Tx: Hibiclens (initiated 6/13/2016), clindamycin lotion (10/21/2015-6/13/2016), gentamicin ointment (initiated 10/21/2015), ketoconazole cream (10/16/2015-6/13/2016)  -s/p culture showing Klebsiella pneumoniae, Citrobacter freundii complex, Proteus mirabilis, Beta hemolytic Streptococcus group B and S. aureus 10/16/2015  2. Pressure Ulcer stage I-II: L posterior inguinal fold with freq superinfection and irritation from urine/stool.   -s/p swab 4/24/2017, MRSA and 2 other gram negative bacteria  -Current Tx: 5% BP wash, Neutroshield or AD ointment, Nystatin powder,      Encounter Date: May 22, 2017    CC:  Chief Complaint   Patient presents with     RECHECK     buttock        History of Present Illness:  Mr. Marcus Cevallos is a 62 year old male who presents as a follow up for irritant dermatitis. The patient was last seen 4/24/2017 when a culture was performed, hydrocortisone ointment, BP wash and Nystatin were continued. Today, the patient's aid reports that he was on a 10 day antibiotic (Bactrim). The affected area is improved since last visit. They are using BP wash, AD Ointment, Gentalycin and hydrocortisone on the area. The patient's aid reports no other lesions of concern.    The patient is present with an aid today.     Past Medical History:   Patient Active Problem List   Diagnosis     Cerebral palsy (H)     Mental retardation     Sleep apnea     Retinal detachment, left     Seizure disorder (H)     CVA  (cerebral vascular accident) (H)     Hypothyroid     Osteopenia     Overweight (BMI 25.0-29.9)     Hypertension goal BP (blood pressure) < 130/80     Hyperlipidemia LDL goal <100     Blepharitis     Vitamin D deficiency disease     History of retinal detachment     Osteoporosis     Past Medical History:   Diagnosis Date     Cataract      Cerebral palsy (H)      CVA (cerebral vascular accident) (H)      HTN (hypertension)      Hypothyroid      Mental retardation      Obesity, unspecified      Retinal detachment, left      Seizure disorder (H)      Sleep apnea      No past surgical history on file.    Social History:  Reviewed and unchanged but kept in chart for clinician convenience  The patient is a vulnerable adult. He is in a care home.     Family History:   Not obtained at visit.     Medications:  Current Outpatient Prescriptions   Medication Sig Dispense Refill     gentamicin (GARAMYCIN) 0.1 % ointment Apply topically 2 times daily 30 g 0     benzoyl peroxide 5 % LIQD Use as an anti-microbial wash in the tub every 2 days. 140 g 11     hydrocortisone valerate (WEST-DONA) 0.2 % ointment Apply to the area once a day. 45 g 0     levETIRAcetam (KEPPRA) 100 MG/ML solution GIVE 12.5ML (1250MG) PER G-TUBE TWICE DAILY 775 mL 11     perampanel (FYCOMPA) 6 MG tablet 1 tablet per G-tube at bedtime 31 tablet 5     diazepam (DIAZEPAM INTENSOL) 5 MG/ML (HIGH CONC) solution Give 1 ml buccally prn for a convulsive seizure > 1 min, 3 min of a non-convulsive seizure or for 2nd non convulsive seizure within 2 hrs of his first seizure. May repeat x 1 after 15 min. In 24 hours. 30 mL 0     valproic acid (DEPAKENE) 250 MG/5ML SYRP syrup TAKE 10ML (500MG) PER G-TUBE TWICE DAILY. 620 mL 10     levofloxacin (LEVAQUIN) 750 MG tablet Take by mouth daily 1 tab for 5 days - D/C 1.27.17       guaiFENesin 400 MG TABS Per packet instructions as needed       order for DME Equipment being ordered: Wheel Chair Cushion 1 each 1     Ascorbic Acid  (VITAMIN C PO) Take 500 mg by mouth daily       loratadine (CLARITIN) 10 MG tablet Take 10 mg by mouth daily       bisacodyl (DULCOLAX) 10 MG suppository Place 10 mg rectally daily as needed for constipation       ACETAMINOPHEN  mg by Oral or G tube route as needed for pain       polyvinyl alcohol (ARTIFICIAL TEARS) 1.4 % ophthalmic solution Instill one drop into both eyes 3 times a day. 6 mL 12     dimethicone (REMEDY NUTRASHIELD) 1 %        Aloe-Sodium Chloride (AYR SALINE NASAL GEL NA)        alendronate (FOSAMAX) 70 MG tablet Take 1 tablet every 7 days via G tube. Take with water and without food or other medication for at least 30 minutes. 13 tablet 3     ergocalciferol (CALCIFEROL) 8000 UNIT/ML drops 12 drops per g tube twice daily 30 mL      Polyethylene Glycol 3350 (MIRALAX PO) Take 8.5 mg by mouth daily        NYSTATIN EX Externally apply topically as needed        aspirin 81 MG tablet Take 1 tablet by mouth daily. (*).   3     CARBAMIDE PEROXIDE 6.5 % OT SOLN as needed       PROCTOSOL HC 2.5 % RE CREA as needed       PATADAY 0.2 % OP SOLN None Entered       ALBUTEROL SULFATE (2.5 MG/3ML) 0.083% IN NEBU as needed       REPLETE/FIBER OR LIQD 1 250cc CAN PER GT 4X/DAY WITH 325CC WATER 4X/DAY=5.5 cans total  0     LEVOTHYROXINE SODIUM 50 MCG OR TABS 1 TABLET DAILY       CALCIUM CARBONATE 1250 MG/5ML OR SUSP 1 teaspoon down G tube daily 90 days 3     Allergies   Allergen Reactions     No Known Allergies      Review of Systems:  -Skin: As above in HPI. No additional skin concerns.    Physical exam:  There were no vitals taken for this visit.  GEN: This is a well developed, well-nourished male in no acute distress, in a pleasant mood.    SKIN: Focused examination of the buttocks was performed.  -Stage 1-2 pressure ulcer without evidence of infection with one small erosion on the left posterior gluteal fold.   -No other lesions of concern on areas examined.     Impression/Plan:  1. Pressure ulcer Stage 1-2  with resolved superinfection and and freq recurrence of irritant dermatitis from urine and stool. In good condition today.     Hold Westcort 0.2% ointment and Gentamycin ointment.     Continue 5% BPO wash during every tub bath to prevent infections.     Continue AD ointment or Vaselineas skin protectorant.     Continue Mepilex padding to the area.     Vaseline and Duoderm placed by nursing staff today. No mepilex in clinic.    Follow-up in 1-2 months, earlier for new or changing lesions.   CC: Dr. Pastora Peterson on close of encounter.    Staff Involved:  Scribe/Staff    Scribe Disclosure:   I, Emelyn Rodriguez, am serving as a scribe to document services personally performed by Dr. López Cruz, based on data collection and the provider's statements to me.    Provider Disclosure:   I have reviewed the documentation recorded by the scribe and have edited it as needed. I have personally performed the services documented here and the documentation accurately represents those services and the decisions made by me.     López Cruz MD, MS    Department of Dermatology  [facility]: Phone: [phone], Fax:[phone]  [facility]: Phone: [phone], Fax: [phone]      Again, thank you for allowing me to participate in the care of your patient.      Sincerely,    López Cruz MD

## 2017-06-22 DIAGNOSIS — G40.419 INTRACTABLE GENERALIZED TONIC-CLONIC EPILEPSY (H): ICD-10-CM

## 2017-06-23 RX ORDER — DIAZEPAM ORAL SOLUTION (CONCENTRATE) 5 MG/ML
SOLUTION ORAL
Qty: 30 ML | Refills: 1 | Status: SHIPPED | OUTPATIENT
Start: 2017-06-23 | End: 2018-02-19

## 2017-06-23 NOTE — TELEPHONE ENCOUNTER
----- Message from [place] Ford CMA sent at 6/23/2017 12:12 PM CDT -----  Regarding: refill  Patient needs a refill on diazepam (DIAZEPAM INTENSOL) 5 MG/ML (HIGH CONC) solution.    Pharmacy:    [facility] - [place], [place] - [address]    RTC date: 4/2018

## 2017-06-30 ENCOUNTER — OFFICE VISIT (OUTPATIENT)
Dept: ENDOCRINOLOGY | Facility: CLINIC | Age: 62
End: 2017-06-30
Payer: MEDICARE

## 2017-06-30 ENCOUNTER — TELEPHONE (OUTPATIENT)
Dept: ENDOCRINOLOGY | Facility: CLINIC | Age: 62
End: 2017-06-30

## 2017-06-30 VITALS — HEART RATE: 63 BPM | SYSTOLIC BLOOD PRESSURE: 109 MMHG | DIASTOLIC BLOOD PRESSURE: 43 MMHG

## 2017-06-30 DIAGNOSIS — G80.9 CEREBRAL PALSY, UNSPECIFIED TYPE (H): ICD-10-CM

## 2017-06-30 DIAGNOSIS — E03.9 HYPOTHYROIDISM, UNSPECIFIED TYPE: Primary | ICD-10-CM

## 2017-06-30 DIAGNOSIS — M81.0 SENILE OSTEOPOROSIS: ICD-10-CM

## 2017-06-30 LAB
T4 FREE SERPL-MCNC: 1.29 NG/DL (ref 0.76–1.46)
TSH SERPL DL<=0.05 MIU/L-ACNC: 2.93 MU/L (ref 0.4–4)

## 2017-06-30 PROCEDURE — 99214 OFFICE O/P EST MOD 30 MIN: CPT | Performed by: INTERNAL MEDICINE

## 2017-06-30 PROCEDURE — 82306 VITAMIN D 25 HYDROXY: CPT | Performed by: INTERNAL MEDICINE

## 2017-06-30 PROCEDURE — 36415 COLL VENOUS BLD VENIPUNCTURE: CPT | Performed by: INTERNAL MEDICINE

## 2017-06-30 PROCEDURE — 84439 ASSAY OF FREE THYROXINE: CPT | Performed by: INTERNAL MEDICINE

## 2017-06-30 PROCEDURE — 84443 ASSAY THYROID STIM HORMONE: CPT | Performed by: INTERNAL MEDICINE

## 2017-06-30 NOTE — TELEPHONE ENCOUNTER
Writer spoke with manager, Butch. Per Dr Kapoor request, Butch will schedule DEXA for Bill at same location as prior DEXA scans.  Saniya Alfredo LPN

## 2017-06-30 NOTE — PROGRESS NOTES
- Endocrinology Follow up -    Reason for visit/consult:  osteoporosis  Primary care provider: Maria Eugenia Rogers MD    HPI: A 63 yo male here for the follow up for his osteoporosis. He is accompanied with his care taker of his group home.   Significant medical history includes cerebropalsy, on wheel chair bound, not eating foods and supplied nutrition from G tube.   Regarding osteoporosis, he is on calcium carbonate liquid 1250 mg daily thgouht G tube and ergocalciferol drops daily.   Regarding mild hypothyroidism, he has been on levothyroxine. All medications are crushed and given through G tube.   Per care taker, he has been doing well.     Parental history of hip fracture: yes  Rheumatoid arthritis:no  Secondary cause of osteoporosis: no steroid  Body habitus (BMI less than or equal to 20):no   Sedentary lifestyle:yes, wheel chair bround  Current tabacco smoking:no  History of thyroid disorder:yes  Calcuim and Vitmin D supplements:yes      Assessment and Plan  62 year old male with osteoporosis  # Based on his current condition, liquid supplement (Ca, VitD ) are most suitable options. Bone density results has been improving from 2014 to 2016. will continue to observe for now until next bone density test.  - continue current caclium carbonate liquid daily  - continue current levothyroxine 50 mcg  - TSH, free T4, VitaminD to check today  - Next bone density test in 2018.  -RTC with me in 1 year    I spent 30 minutes with this patient face to face and explained the conditions and plans (more than 50% of time was counseling/coordination of care, bone treatment plan . The patient understood and is satisfied with today's visit. Return to clinic with me in 1 year.         Sona Kapoor MD  Staff Physician  Endocrinology and Metabolism  License: [id number]      Past Medical/Surgical History:  Past Medical History:   Diagnosis Date     Cataract      Cerebral palsy (H)      CVA  (cerebral vascular accident) (H)      HTN (hypertension)      Hypothyroid      Mental retardation      Obesity, unspecified      Retinal detachment, left      Seizure disorder (H)      Sleep apnea      History reviewed. No pertinent surgical history.    Allergies:  Allergies   Allergen Reactions     No Known Allergies        Current Medications   Current Outpatient Prescriptions   Medication     benzoyl peroxide 5 % LIQD     hydrocortisone valerate (WEST-DONA) 0.2 % ointment     levETIRAcetam (KEPPRA) 100 MG/ML solution     perampanel (FYCOMPA) 6 MG tablet     valproic acid (DEPAKENE) 250 MG/5ML SYRP syrup     levofloxacin (LEVAQUIN) 750 MG tablet     order for DME     Ascorbic Acid (VITAMIN C PO)     polyvinyl alcohol (ARTIFICIAL TEARS) 1.4 % ophthalmic solution     dimethicone (REMEDY NUTRASHIELD) 1 %     Aloe-Sodium Chloride (AYR SALINE NASAL GEL NA)     alendronate (FOSAMAX) 70 MG tablet     ergocalciferol (CALCIFEROL) 8000 UNIT/ML drops     Polyethylene Glycol 3350 (MIRALAX PO)     aspirin 81 MG tablet     CARBAMIDE PEROXIDE 6.5 % OT SOLN     PATADAY 0.2 % OP SOLN     ALBUTEROL SULFATE (2.5 MG/3ML) 0.083% IN NEBU     REPLETE/FIBER OR LIQD     LEVOTHYROXINE SODIUM 50 MCG OR TABS     CALCIUM CARBONATE 1250 MG/5ML OR SUSP     diazepam (VALIUM) 5 MG/ML (HIGH CONC) solution     guaiFENesin 400 MG TABS     loratadine (CLARITIN) 10 MG tablet     bisacodyl (DULCOLAX) 10 MG suppository     ACETAMINOPHEN PO     NYSTATIN EX     PROCTOSOL HC 2.5 % RE CREA     No current facility-administered medications for this visit.        Family History:  Family History   Problem Relation Age of Onset     Unknown/Adopted Mother      Hypertension Father      DIABETES No family hx of      Thyroid Disease No family hx of      Glaucoma No family hx of      Macular Degeneration No family hx of      CANCER No family hx of        Social History:  Social History   Substance Use Topics     Smoking status: Never Smoker     Smokeless tobacco:  Never Used     Alcohol use No   Lives in group home, sharing room with other 2 residents    ROS:  Full review of systems taken with the help of the intake sheet. Otherwise a complete 14 point review of systems was taken and is negative unless stated in the history above.    Physical Exam:   Blood pressure 109/43, pulse 63.  General: well appearing, no acute distress, eye contact+,   Mental Status/neuro: awake, eye contact=  Face:normal facial color  Neck: suppler, no lymphadenopahty  Thyroid: normal size   Back: severe kyphosis, scoliosis  Heart: regular rhythm, S1S2, no murmur appreciated  Lung: clear to auscultation bilaterally  Abdomen: soft, NT/ND, no hepatomegaly, G tube+  Legs: no swelling or edema      Labs (General):   Lab Results   Component Value Date     11/28/2016      Lab Results   Component Value Date    POTASSIUM 4.0 11/28/2016     Lab Results   Component Value Date    CHLORIDE 104 11/28/2016     Lab Results   Component Value Date    ARMANI 9.1 11/28/2016     Lab Results   Component Value Date    CO2 24 11/28/2016     Lab Results   Component Value Date    BUN 21 11/28/2016     Lab Results   Component Value Date    CR 0.39 11/28/2016     Lab Results   Component Value Date    GLC 87 11/28/2016     Lab Results   Component Value Date    TSH 1.51 03/02/2016     Lab Results   Component Value Date    T4 1.0 03/02/2016     BONE DENSITOMETRY 2/1/2016 : I also reviewed the original images and agree below report.       RISK FACTORS: Follow up severe osteoporosis, Fracture of hip      CURRENT TREATMENT: Calcium, Vitamin D, Fosamax (alendronate)     FINDINGS:  Lumbar Spine (L1-L4) T-score: -1.8, degenerative changes present  Right Total HIp T-score: -2.6  The left femur is not acceptable for evaluation due to previous surgical repair.      Lumbar (L1-L4) BMD: 0.999 Previous: 0.917   Right Total Hip BMD: 0.726 Previous: 0.659     Comparison is made to another DXA performed on the same [employer] machine on  01/21/2014.       There has been significant increase in bone density of the lumbar spine. There has been significant increase in bone density of the femur.

## 2017-06-30 NOTE — LETTER
Patient:  Marcus Cevallos  :   1955  MRN:     [id number]        Mr.William WALTER Cevallos  [address] [facility] [address]        2017    Dear ,    We are writing to inform you of your test results. Results looks good.    Take care    Sona Kapoor MD      Resulted Orders   TSH   Result Value Ref Range    TSH 2.93 0.40 - 4.00 mU/L   T4, free   Result Value Ref Range    T4 Free 1.29 0.76 - 1.46 ng/dL   Vitamin D Deficiency   Result Value Ref Range    Vitamin D Deficiency screening 44 20 - 75 ug/L      Comment:      Season, race, dietary intake, and treatment affect the concentration of   25-hydroxy-Vitamin D. Values may decrease during winter months and increase   during summer months. Values 20-29 ug/L may indicate Vitamin D insufficiency   and values <20 ug/L may indicate Vitamin D deficiency.   Vitamin D determination is routinely performed by an immunoassay specific for   25 hydroxyvitamin D3.  If an individual is on vitamin D2 (ergocalciferol)   supplementation, please specify 25 OH vitamin D2 and D3 level determination   by   LCMSMS test [id number].         Sona Kapoor MD

## 2017-06-30 NOTE — LETTER
6/30/2017       RE: Marcus Cevallos  [address] [address] [address]     Dear Colleague,    Thank you for referring your patient, Marcus Cevallos, to the [facility] at [facility]. Please see a copy of my visit note below.                                           - Endocrinology Follow up -    Reason for visit/consult:  osteoporosis  Primary care provider: Maria Eugenia Rogers MD    HPI: A 63 yo male here for the follow up for his osteoporosis. He is accompanied with his care taker of his group home.   Significant medical history includes cerebropalsy, on wheel chair bound, not eating foods and supplied nutrition from G tube.   Regarding osteoporosis, he is on calcium carbonate liquid 1250 mg daily thgouht G tube and ergocalciferol drops daily.   Regarding mild hypothyroidism, he has been on levothyroxine. All medications are crushed and given through G tube.   Per care taker, he has been doing well.     Parental history of hip fracture: yes  Rheumatoid arthritis:no  Secondary cause of osteoporosis: no steroid  Body habitus (BMI less than or equal to 20):no   Sedentary lifestyle:yes, wheel chair bround  Current tabacco smoking:no  History of thyroid disorder:yes  Calcuim and Vitmin D supplements:yes      Assessment and Plan  62 year old male with osteoporosis  # Based on his current condition, liquid supplement (Ca, VitD ) are most suitable options. Bone density results has been improving from 2014 to 2016. will continue to observe for now until next bone density test.  - continue current caclium carbonate liquid daily  - continue current levothyroxine 50 mcg  - TSH, free T4, VitaminD to check today  - Next bone density test in 2018.  -RTC with me in 1 year    I spent 30 minutes with this patient face to face and explained the conditions and plans (more than 50% of time was counseling/coordination of care, bone treatment plan . The  patient understood and is satisfied with today's visit. Return to clinic with me in 1 year.         Sona Kapoor MD  Staff Physician  Endocrinology and Metabolism  License: [id number]      Past Medical/Surgical History:  Past Medical History:   Diagnosis Date     Cataract      Cerebral palsy (H)      CVA (cerebral vascular accident) (H)      HTN (hypertension)      Hypothyroid      Mental retardation      Obesity, unspecified      Retinal detachment, left      Seizure disorder (H)      Sleep apnea      History reviewed. No pertinent surgical history.    Allergies:  Allergies   Allergen Reactions     No Known Allergies        Current Medications   Current Outpatient Prescriptions   Medication     benzoyl peroxide 5 % LIQD     hydrocortisone valerate (WEST-DONA) 0.2 % ointment     levETIRAcetam (KEPPRA) 100 MG/ML solution     perampanel (FYCOMPA) 6 MG tablet     valproic acid (DEPAKENE) 250 MG/5ML SYRP syrup     levofloxacin (LEVAQUIN) 750 MG tablet     order for DME     Ascorbic Acid (VITAMIN C PO)     polyvinyl alcohol (ARTIFICIAL TEARS) 1.4 % ophthalmic solution     dimethicone (REMEDY NUTRASHIELD) 1 %     Aloe-Sodium Chloride (AYR SALINE NASAL GEL NA)     alendronate (FOSAMAX) 70 MG tablet     ergocalciferol (CALCIFEROL) 8000 UNIT/ML drops     Polyethylene Glycol 3350 (MIRALAX PO)     aspirin 81 MG tablet     CARBAMIDE PEROXIDE 6.5 % OT SOLN     PATADAY 0.2 % OP SOLN     ALBUTEROL SULFATE (2.5 MG/3ML) 0.083% IN NEBU     REPLETE/FIBER OR LIQD     LEVOTHYROXINE SODIUM 50 MCG OR TABS     CALCIUM CARBONATE 1250 MG/5ML OR SUSP     diazepam (VALIUM) 5 MG/ML (HIGH CONC) solution     guaiFENesin 400 MG TABS     loratadine (CLARITIN) 10 MG tablet     bisacodyl (DULCOLAX) 10 MG suppository     ACETAMINOPHEN PO     NYSTATIN EX     PROCTOSOL HC 2.5 % RE CREA     No current facility-administered medications for this visit.        Family History:  Family History   Problem Relation Age of Onset     Unknown/Adopted Mother       Hypertension Father      DIABETES No family hx of      Thyroid Disease No family hx of      Glaucoma No family hx of      Macular Degeneration No family hx of      CANCER No family hx of        Social History:  Social History   Substance Use Topics     Smoking status: Never Smoker     Smokeless tobacco: Never Used     Alcohol use No   Lives in group home, sharing room with other 2 residents    ROS:  Full review of systems taken with the help of the intake sheet. Otherwise a complete 14 point review of systems was taken and is negative unless stated in the history above.    Physical Exam:   Blood pressure 109/43, pulse 63.  General: well appearing, no acute distress, eye contact+,   Mental Status/neuro: awake, eye contact=  Face:normal facial color  Neck: suppler, no lymphadenopahty  Thyroid: normal size   Back: severe kyphosis, scoliosis  Heart: regular rhythm, S1S2, no murmur appreciated  Lung: clear to auscultation bilaterally  Abdomen: soft, NT/ND, no hepatomegaly, G tube+  Legs: no swelling or edema      Labs (General):   Lab Results   Component Value Date     11/28/2016      Lab Results   Component Value Date    POTASSIUM 4.0 11/28/2016     Lab Results   Component Value Date    CHLORIDE 104 11/28/2016     Lab Results   Component Value Date    ARMANI 9.1 11/28/2016     Lab Results   Component Value Date    CO2 24 11/28/2016     Lab Results   Component Value Date    BUN 21 11/28/2016     Lab Results   Component Value Date    CR 0.39 11/28/2016     Lab Results   Component Value Date    GLC 87 11/28/2016     Lab Results   Component Value Date    TSH 1.51 03/02/2016     Lab Results   Component Value Date    T4 1.0 03/02/2016     BONE DENSITOMETRY 2/1/2016 : I also reviewed the original images and agree below report.       RISK FACTORS: Follow up severe osteoporosis, Fracture of hip      CURRENT TREATMENT: Calcium, Vitamin D, Fosamax (alendronate)     FINDINGS:  Lumbar Spine (L1-L4) T-score: -1.8, degenerative  changes present  Right Total HIp T-score: -2.6  The left femur is not acceptable for evaluation due to previous surgical repair.      Lumbar (L1-L4) BMD: 0.999 Previous: 0.917   Right Total Hip BMD: 0.726 Previous: 0.659     Comparison is made to another DXA performed on the same Lunar Prodigy machine on 01/21/2014.       There has been significant increase in bone density of the lumbar spine. There has been significant increase in bone density of the femur.        Again, thank you for allowing me to participate in the care of your patient.      Sincerely,    Sona Kapoor MD

## 2017-06-30 NOTE — LETTER
Patient:  Marcus Cevallos  :   1955  MRN:     [id number]        Mr.William WALTER Cevallos  [address] [address]        2017    Dear ,    We are writing to inform you of your test results. All normal range.     Take care    Sona Kapoor MD      Resulted Orders   TSH   Result Value Ref Range    TSH 2.93 0.40 - 4.00 mU/L   T4, free   Result Value Ref Range    T4 Free 1.29 0.76 - 1.46 ng/dL   Vitamin D Deficiency   Result Value Ref Range    Vitamin D Deficiency screening 44 20 - 75 ug/L      Comment:      Season, race, dietary intake, and treatment affect the concentration of   25-hydroxy-Vitamin D. Values may decrease during winter months and increase   during summer months. Values 20-29 ug/L may indicate Vitamin D insufficiency   and values <20 ug/L may indicate Vitamin D deficiency.   Vitamin D determination is routinely performed by an immunoassay specific for   25 hydroxyvitamin D3.  If an individual is on vitamin D2 (ergocalciferol)   supplementation, please specify 25 OH vitamin D2 and D3 level determination   by   LCMSMS test [id number].         Sona Kapoor MD

## 2017-06-30 NOTE — MR AVS SNAPSHOT
After Visit Summary   6/30/2017    Marcus Cevallos    MRN: [id number]           Patient Information     Date Of Birth          1955        Visit Information        Provider Department      6/30/2017 8:30 AM Sona Kapoor MD [facility]        Care Instructions    Thank you for choosing the [facility] Department of Endocrinology. It has been a pleasure servicing you today. Please contact us at [phone] with any questions. If you need to speak to an Endocrinologist and it is after 5:00 pm or on a weekend, please call the On-Call Endocrinologist at [phone].            Follow-ups after your visit        Your next 10 appointments already scheduled     Sep 26, 2017  8:30 AM CDT   Return Visit with López Cruz MD   [facility] ([facility]    [address] [place] [id number] [phone]            Jun 29, 2018  8:30 AM CDT   Return Visit with Sona Kapoor MD   [facility] ([facility])    [address] [place] [id number] [phone]              Who to contact     If you have questions or need follow up information about today's clinic visit or your schedule please contact [facility] directly at [phone].  Normal or non-critical lab and imaging results will be communicated to you by MyChart, letter or phone within 4 business days after the clinic has received the results. If you do not hear from us within 7 days, please contact the clinic through MyChart or phone. If you have a critical or abnormal lab result, we will notify you by phone as soon as possible.  Submit refill requests through [employer] or call your pharmacy and they will forward the refill request to us. Please allow 3 business days for your refill to be completed.          Additional Information About Your Visit        MyChart Information     [employer] is an  electronic gateway that provides easy, online access to your medical records. With [employer], you can request a clinic appointment, read your test results, renew a prescription or communicate with your care team.     To sign up for [employer] visit the website at [email or web address]   You will be asked to enter the access code listed below, as well as some personal information. Please follow the directions to create your username and password.     Your access code is: [email or web address]  Expires: 2017  1:50 PM     Your access code will  in 90 days. If you need help or a new code, please contact your [facility] or call [phone] for assistance.        Care EveryWhere ID     This is your Care EveryWhere ID. This could be used by other organizations to access your [place] medical records  [id number]        Your Vitals Were     Pulse                   63            Blood Pressure from Last 3 Encounters:   17 109/43   17 137/72   17 114/63    Weight from Last 3 Encounters:   17 68 kg (149 lb 14.4 oz)   17 67.8 kg (149 lb 8 oz)   16 67.9 kg (149 lb 9.6 oz)              Today, you had the following     No orders found for display       Primary Care Provider Office Phone # Fax #    Maria Eugenia WATSON Fili Dowell MD, [phone]       [facility] PHYSICIAN [facility] [address]  [facility] [address]        Equal Access to Services     [facility] Center[facility]ATIANA : Hadii aad ku hadasho Soomaali, waaxda luqadaha, qaybta kaalmada adeegyada, waxmariella idiin haybrunan vernell canales . So [facility] [phone].    ATENCIÓN: Si habla español, tiene a wells disposición servicios gratuitos de asistencia lingüística. Llame al [phone].    We comply with applicable federal civil rights laws and [place] laws. We do not discriminate on the basis of race, color, national origin, age, disability sex, sexual orientation or gender identity.            Thank you!      Thank you for choosing [facility]  for your care. Our goal is always to provide you with excellent care. Hearing back from our patients is one way we can continue to improve our services. Please take a few minutes to complete the written survey that you may receive in the mail after your visit with us. Thank you!             Your Updated Medication List - Protect others around you: Learn how to safely use, store and throw away your medicines at www.disposemymeds.org.          This list is accurate as of: 6/30/17  9:12 AM.  Always use your most recent med list.                   Brand Name Dispense Instructions for use Diagnosis    ACETAMINOPHEN PO      325 mg by Oral or G tube route as needed for pain        albuterol (2.5 MG/3ML) 0.083% neb solution      as needed        alendronate 70 MG tablet    FOSAMAX    13 tablet    Take 1 tablet every 7 days via G tube. Take with water and without food or other medication for at least 30 minutes.    Osteoporosis       aspirin 81 MG tablet      Take 1 tablet by mouth daily. (*).        AYR SALINE NASAL GEL NA           benzoyl peroxide 5 % Liqd     140 g    Use as an anti-microbial wash in the tub every 2 days.    Irritant dermatitis       bisacodyl 10 MG Suppository    DULCOLAX     Place 10 mg rectally daily as needed for constipation        CALCIFEROL 8000 UNIT/ML drops   Generic drug:  ergocalciferol     30 mL    12 drops per g tube twice daily        calcium carbonate 1250 (500 CA) MG/5ML Susp suspension     90 days    1 teaspoon down G tube daily    Cerebral palsy (H)       carbamide peroxide 6.5 % otic solution    DEBROX     as needed        diazepam 5 MG/ML (HIGH CONC) solution    VALIUM    30 mL    Take 1 ml (5mg) buccally as needed for a convulsive seizure >1 min, 3 min of a non-convulsive seizure or 2nd non-convulsive seizure .Each bottle needs to last 90 days!    Intractable generalized tonic-clonic epilepsy (H)       guaiFENesin 400 MG Tabs       Per packet instructions as needed        hydrocortisone valerate 0.2 % ointment    WEST-DONA    45 g    Apply to the area once a day.    Irritant dermatitis       levETIRAcetam 100 MG/ML solution    KEPPRA    775 mL    GIVE 12.5ML (1250MG) PER G-TUBE TWICE DAILY    Seizure disorder (H)       levofloxacin 750 MG tablet    LEVAQUIN     Take by mouth daily 1 tab for 5 days - D/C 1.27.17        levothyroxine 50 MCG tablet    SYNTHROID/LEVOTHROID     1 TABLET DAILY        loratadine 10 MG tablet    CLARITIN     Take 10 mg by mouth daily        MIRALAX PO      Take 8.5 mg by mouth daily        NYSTATIN EX      Externally apply topically as needed        order for DME     1 each    Equipment being ordered: Wheel Chair Cushion    Skin erosion       PATADAY 0.2 % Soln   Generic drug:  olopatadine HCl      None Entered        perampanel 6 MG tablet    FYCOMPA    31 tablet    1 tablet per G-tube at bedtime    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    Keratitis sicca, bilateral       PROCTOSOL HC 2.5 % cream   Generic drug:  hydrocortisone      as needed        REMEDY NUTRASHIELD 1 %   Generic drug:  dimethicone           REPLETE/FIBER Liqd      1 250cc CAN PER GT 4X/DAY WITH 325CC WATER 4X/DAY=5.5 cans total    Obesity       valproic acid 250 MG/5ML Syrp syrup    DEPAKENE    620 mL    TAKE 10ML (500MG) PER G-TUBE TWICE DAILY.    Seizure disorder (H)       VITAMIN C PO      Take 500 mg by mouth daily

## 2017-06-30 NOTE — NURSING NOTE
"Marcus Cevallos's goals for this visit include:   Chief Complaint   Patient presents with     Osteoporosis       He requests these members of his care team be copied on today's visit information: Maria Eugenia Rogers MD      PCP: Maria Eugenia Rogers MD    Referring Provider:  No referring provider defined for this encounter.    Chief Complaint   Patient presents with     Osteoporosis       Initial /43  Pulse 63 Estimated body mass index is 28.34 kg/(m^2) as calculated from the following:    Height as of 4/21/17: 1.549 m (5' 0.98\").    Weight as of 4/21/17: 68 kg (149 lb 14.4 oz).  Medication Reconciliation: complete    Do you need any medication refills at today's visit? No    Saniya Alfredo LPN      "

## 2017-06-30 NOTE — PATIENT INSTRUCTIONS
Thank you for choosing the [facility], Department of Endocrinology. It has been a pleasure servicing you today. Please contact us at [phone] with any questions. If you need to speak to an Endocrinologist and it is after 5:00 pm or on a weekend, please call the On-Call Endocrinologist at [phone].

## 2017-07-03 LAB — DEPRECATED CALCIDIOL+CALCIFEROL SERPL-MC: 44 UG/L (ref 20–75)

## 2017-10-18 DIAGNOSIS — G40.319 GENERALIZED CONVULSIVE EPILEPSY WITH INTRACTABLE EPILEPSY (H): ICD-10-CM

## 2017-10-19 DIAGNOSIS — G40.319 GENERALIZED CONVULSIVE EPILEPSY WITH INTRACTABLE EPILEPSY (H): ICD-10-CM

## 2017-10-19 RX ORDER — PERAMPANEL 6 MG/1
TABLET ORAL
Qty: 31 TABLET | Refills: 5 | Status: SHIPPED | OUTPATIENT
Start: 2017-10-19 | End: 2018-02-14

## 2017-10-20 RX ORDER — PERAMPANEL 6 MG/1
TABLET ORAL
Refills: 0 | OUTPATIENT
Start: 2017-10-20

## 2017-10-30 ENCOUNTER — OFFICE VISIT (OUTPATIENT)
Dept: OPTOMETRY | Facility: CLINIC | Age: 62
End: 2017-10-30
Payer: MEDICARE

## 2017-10-30 DIAGNOSIS — Z86.69 HISTORY OF RETINAL DETACHMENT: Primary | ICD-10-CM

## 2017-10-30 PROCEDURE — 92014 COMPRE OPH EXAM EST PT 1/>: CPT | Performed by: OPTOMETRIST

## 2017-10-30 RX ORDER — CETIRIZINE HYDROCHLORIDE 5 MG/1
5 TABLET, CHEWABLE ORAL 2 TIMES DAILY
COMMUNITY

## 2017-10-30 ASSESSMENT — VISUAL ACUITY
OS_SC: NONVERBAL
OD_SC: NONVERBAL
METHOD: SNELLEN - LINEAR

## 2017-10-30 ASSESSMENT — EXTERNAL EXAM - RIGHT EYE: OD_EXAM: NORMAL

## 2017-10-30 ASSESSMENT — TONOMETRY
OD_IOP_MMHG: 21
OS_IOP_MMHG: SOFT
IOP_METHOD: TONOPEN

## 2017-10-30 ASSESSMENT — EXTERNAL EXAM - LEFT EYE: OS_EXAM: NORMAL

## 2017-10-30 ASSESSMENT — SLIT LAMP EXAM - LIDS
COMMENTS: BLEPHARITIS
COMMENTS: BLEPHARITIS

## 2017-10-30 ASSESSMENT — CONF VISUAL FIELD: COMMENTS: UNABLE

## 2017-10-30 NOTE — MR AVS SNAPSHOT
After Visit Summary   10/30/2017    Marcus Cevallos    MRN: [id number]           Patient Information     Date Of Birth          1955        Visit Information        Provider Department      10/30/2017 2:40 PM Anthony Summers OD [facility]        Today's Diagnoses     History of retinal detachment    -  1      Care Instructions    Return in 1 year for a complete eye exam or sooner if needed.    Anthony Summers OD            Follow-ups after your visit        Follow-up notes from your care team     Return in about 1 year (around 10/30/2018) for Annual Visit.      Your next 10 appointments already scheduled     Jan 04, 2018  4:00 PM CST   (Arrive by 3:45 PM)   New Patient Visit with Blayne Bryson MD   [facility] Dermat[facility] ([facility])    [address]  3rd Floor  [facility] [address] [phone]            Feb 27, 2018  1:30 PM CST   DX HIP/PELVIS/SPINE with UCDX1   [facility] [place] ([facility])    [address] [place] [address] [phone]           Please do not take any of the following 24 hours prior to the day of your exam: vitamins, calcium tablets, antacids.  If possible, please wear clothes without metal (snaps, zippers). A sweatsuit works well.            Mar 20, 2018  2:30 PM CDT   Return Visit with Nanda De La Torre MD   [facility] ([facility])    [address] [place] [address], [address]  [facility] [id number] [phone]            Jun 29, 2018  8:30 AM CDT   Return Visit with Sona Kapoor MD   [facility] ([facility])    [address] [address] [place] [address] [phone]              Who to contact     If you have questions or need follow up information about today's clinic visit or your schedule please contact [facility] directly at [phone].  Normal or non-critical lab  "and imaging results will be communicated to you by MyChart, letter or phone within 4 business days after the clinic has received the results. If you do not hear from us within 7 days, please contact the clinic through [employer] or phone. If you have a critical or abnormal lab result, we will notify you by phone as soon as possible.  Submit refill requests through [employer] or call your pharmacy and they will forward the refill request to us. Please allow 3 business days for your refill to be completed.          Additional Information About Your Visit        [employer] Information     [employer] lets you send messages to your doctor, view your test results, renew your prescriptions, schedule appointments and more. To sign up, go to [email or web address] . Click on \"Log in\" on the left side of the screen, which will take you to the Welcome page. Then click on \"Sign up Now\" on the right side of the page.     You will be asked to enter the access code listed below, as well as some personal information. Please follow the directions to create your username and password.     Your access code is: [email or web address]  Expires: 2018  3:48 PM     Your access code will  in 90 days. If you need help or a new code, please call your [facility] clinic or [phone].        Care EveryWhere ID     This is your Care EveryWhere ID. This could be used by other organizations to access your [facility] medical records  [id number]         Blood Pressure from Last 3 Encounters:   17 109/43   17 137/72   17 114/63    Weight from Last 3 Encounters:   17 68 kg (149 lb 14.4 oz)   17 67.8 kg (149 lb 8 oz)   16 67.9 kg (149 lb 9.6 oz)              We Performed the Following     EYE EXAM (SIMPLE-NONBILLABLE)        Primary Care Provider Office Phone # Fax #    Maria Eugenia Dowell MD, [phone]       [facility] PHYSICIAN [facility] [address] [address]        Equal Access to " Services     [facility]: Hadii aad ku raheel Sogeoff, waaxda luqadaha, qaybta kaalmada geronimoadolfoizabel, serena canales . So [facility] [phone].    ATENCIÓN: Si moniquela moshe, tiene a wells disposición servicios gratuitos de asistencia lingüística. Llame al [phone].    We comply with applicable federal civil rights laws and [place] laws. We do not discriminate on the basis of race, color, national origin, age, disability, sex, sexual orientation, or gender identity.            Thank you!     Thank you for choosing [facility]  for your care. Our goal is always to provide you with excellent care. Hearing back from our patients is one way we can continue to improve our services. Please take a few minutes to complete the written survey that you may receive in the mail after your visit with us. Thank you!             Your Updated Medication List - Protect others around you: Learn how to safely use, store and throw away your medicines at www.disposemymeds.org.          This list is accurate as of: 10/30/17  3:48 PM.  Always use your most recent med list.                   Brand Name Dispense Instructions for use Diagnosis    ACETAMINOPHEN PO      325 mg by Oral or G tube route as needed for pain        albuterol (2.5 MG/3ML) 0.083% neb solution      as needed        alendronate 70 MG tablet    FOSAMAX    13 tablet    Take 1 tablet every 7 days via G tube. Take with water and without food or other medication for at least 30 minutes.    Osteoporosis       aspirin 81 MG tablet      Take 1 tablet by mouth daily. (*).        AYR SALINE NASAL GEL NA           benzoyl peroxide 5 % Liqd     140 g    Use as an anti-microbial wash in the tub every 2 days.    Irritant dermatitis       bisacodyl 10 MG Suppository    DULCOLAX     Place 10 mg rectally daily as needed for constipation        CALCIFEROL 8000 UNIT/ML drops   Generic drug:  ergocalciferol     30 mL    12 drops per g tube twice  daily        calcium carbonate 1250 (500 CA) MG/5ML Susp suspension     90 days    1 teaspoon down G tube daily    Cerebral palsy (H)       carbamide peroxide 6.5 % otic solution    DEBROX     as needed        cetirizine 5 MG Chew    zyrTEC     Take 5 mg by mouth daily        diazepam 5 MG/ML (HIGH CONC) solution    VALIUM    30 mL    Take 1 ml (5mg) buccally as needed for a convulsive seizure >1 min, 3 min of a non-convulsive seizure or 2nd non-convulsive seizure .Each bottle needs to last 90 days!    Intractable generalized tonic-clonic epilepsy (H)       FYCOMPA 6 MG tablet   Generic drug:  perampanel     31 tablet    TAKE 1 TABLET PER G-TUBE AT BEDTIME (SIGN NARC BOOK) **6 TOTAL FILLS*    Generalized convulsive epilepsy with intractable epilepsy (H)       guaiFENesin 400 MG Tabs      Per packet instructions as needed        hydrocortisone valerate 0.2 % ointment    WEST-DONA    45 g    Apply to the area once a day.    Irritant dermatitis       levETIRAcetam 100 MG/ML solution    KEPPRA    775 mL    GIVE 12.5ML (1250MG) PER G-TUBE TWICE DAILY    Seizure disorder (H)       levofloxacin 750 MG tablet    LEVAQUIN     Take by mouth daily 1 tab for 5 days - D/C 1.27.17        levothyroxine 50 MCG tablet    SYNTHROID/LEVOTHROID     1 TABLET DAILY        loratadine 10 MG tablet    CLARITIN     Take 10 mg by mouth daily        MIRALAX PO      Take 8.5 mg by mouth daily        NYSTATIN EX      Externally apply topically as needed        order for DME     1 each    Equipment being ordered: Wheel Chair Cushion    Skin erosion       PATADAY 0.2 % Soln   Generic drug:  olopatadine HCl      None Entered        polyvinyl alcohol 1.4 % ophthalmic solution    ARTIFICIAL TEARS    6 mL    Instill one drop into both eyes 3 times a day.    Keratitis sicca, bilateral       PROCTOSOL HC 2.5 % cream   Generic drug:  hydrocortisone      as needed        REMEDY NUTRASHIELD 1 %   Generic drug:  dimethicone           REPLETE/FIBER Liqd       1 250cc CAN PER GT 4X/DAY WITH 325CC WATER 4X/DAY=5.5 cans total    Obesity       valproic acid 250 MG/5ML Syrp syrup    DEPAKENE    620 mL    TAKE 10ML (500MG) PER G-TUBE TWICE DAILY.    Seizure disorder (H)       VITAMIN C PO      Take 500 mg by mouth daily

## 2017-10-30 NOTE — PROGRESS NOTES
Chief Complaint   Patient presents with     COMPREHENSIVE EYE EXAM      Accompanied by staff  Last Eye Exam: 10-5-2016  Dilated Previously: Yes    What are you currently using to see?  does not use glasses or contacts       Distance Vision Acuity:   nonverbal    Near Vision Acuity:   nonverbal  Eye Comfort: nonverbal  Do you use eye drops? : Yes: artificial tears  Occupation or Hobbies: day program    Cindy Ibarra Optometric Assistant, A.B.O[employer]          Medical, surgical and family histories reviewed and updated 10/30/2017.       OBJECTIVE: See Ophthalmology exam    ASSESSMENT:    ICD-10-CM    1. History of retinal detachment Z86.69       PLAN:     Patient Instructions   Return in 1 year for a complete eye exam or sooner if needed.    Anthony Summers, OD

## 2018-01-04 ENCOUNTER — OFFICE VISIT (OUTPATIENT)
Dept: DERMATOLOGY | Facility: CLINIC | Age: 63
End: 2018-01-04
Payer: MEDICARE

## 2018-01-04 DIAGNOSIS — L30.4 INTERTRIGO: ICD-10-CM

## 2018-01-04 DIAGNOSIS — L89.321 DECUBITUS ULCER OF LEFT BUTTOCK, STAGE 1: Primary | ICD-10-CM

## 2018-01-04 RX ORDER — HYDROCORTISONE VALERATE CREAM 2 MG/G
CREAM TOPICAL
Qty: 60 G | Refills: 4 | Status: SHIPPED | OUTPATIENT
Start: 2018-01-04 | End: 2018-08-14

## 2018-01-04 RX ORDER — ECONAZOLE NITRATE 10 MG/G
CREAM TOPICAL
Qty: 30 G | Refills: 11 | Status: SHIPPED | OUTPATIENT
Start: 2018-01-04

## 2018-01-04 ASSESSMENT — PAIN SCALES - GENERAL: PAINLEVEL: NO PAIN (0)

## 2018-01-04 NOTE — LETTER
1/4/2018       RE: Marcus Cevallos  [address] [facility] [address]     Dear Colleague,    Thank you for referring your patient, Marcus Cevallos, to the [facility] DERMAT[facility]Y at [facility]. Please see a copy of my visit note below.    [facility] Dermatology Note      Dermatology Problem List:   1.  Intertrigo, groin.  Current treatment includes Westcort, econazole t.i.w. as maintenance, b.i.d. p.r.n. with flares.  Benzoyl peroxide wash with showering, A&D ointment with changes.         2.  Pressure ulcer, stage 1, left posterior buttock.  Pressure offloading measures, Mepilex, cares as above.        Encounter Date:  01/04/2018      CC: F/u ulcer and intertrigo.      History of Present Illness:   Marcus Cevallos is a pleasant, 62-year-old gentleman with a history of cerebral palsy, mental retardation and wheelchair dependence who presents today for followup of irritant dermatitis of the groin/intertrigo as well as a stage 1 pressure ulcer on the left buttock.  The patient was last seen 05/22/2017 by Dr. López Cruz, at which point things were going quite well.  His care provider notes that they no longer are using Westcort or gentamicin ointment.  They are not using any antifungals at this point.  They are continuing with the benzoyl peroxide wash with tub baths to prevent infections, A&D ointment or Vaseline as a skin protectant.  They continue with Mepilex padding to the buttock area that in the past has been open, but has been closed as of recent.  Continuing with every 2 hour repositioning and pressure offloading measures.  Physician cares through [facility].  They are using Nutrashield skin protectant to the jaja area t.i.d. with cares.  No other major concerns today.  The patient has been doing well without fever, chills or sweats.  No issues with recurrent infection.  History of a culture in the past growing Klebsiella pneumonia,  Citrobacter freundii complex, Proteus mirabilis, beta-hemolytic strep, group B, and Staph aureus 10/16/2015.        Social History:     The patient is a vulnerable adult.  He is in a care home.        PAST MEDICAL HISTORY:   Past Medical History:   Diagnosis Date     Cataract      Cerebral palsy (H)      CVA (cerebral vascular accident) (H)      HTN (hypertension)      Hypothyroid      Mental retardation      Obesity, unspecified      Retinal detachment, left      Seizure disorder (H)      Sleep apnea        PAST SURGICAL HISTORY: No past surgical history on file.    FAMILY HISTORY:   Family History   Problem Relation Age of Onset     Unknown/Adopted Mother      Hypertension Father        SOCIAL HISTORY:   Social History   Substance Use Topics     Smoking status: Never Smoker     Smokeless tobacco: Never Used     Alcohol use No       Current Outpatient Prescriptions   Medication     hydrocortisone (WESTCORT) 0.2 % cream     econazole nitrate 1 % cream     benzoyl peroxide 5 % LIQD     levETIRAcetam (KEPPRA) 100 MG/ML solution     valproic acid (DEPAKENE) 250 MG/5ML SYRP syrup     Ascorbic Acid (VITAMIN C PO)     bisacodyl (DULCOLAX) 10 MG suppository     Aloe-Sodium Chloride (AYR SALINE NASAL GEL NA)     ergocalciferol (CALCIFEROL) 8000 UNIT/ML drops     aspirin 81 MG tablet     LEVOTHYROXINE SODIUM 50 MCG OR TABS     CALCIUM CARBONATE 1250 MG/5ML OR SUSP     cetirizine (ZYRTEC) 5 MG CHEW     FYCOMPA 6 MG tablet     diazepam (VALIUM) 5 MG/ML (HIGH CONC) solution     hydrocortisone valerate (WEST-DONA) 0.2 % ointment     levofloxacin (LEVAQUIN) 750 MG tablet     guaiFENesin 400 MG TABS     order for DME     loratadine (CLARITIN) 10 MG tablet     ACETAMINOPHEN PO     polyvinyl alcohol (ARTIFICIAL TEARS) 1.4 % ophthalmic solution     dimethicone (REMEDY NUTRASHIELD) 1 %     alendronate (FOSAMAX) 70 MG tablet     Polyethylene Glycol 3350 (MIRALAX PO)     NYSTATIN EX     CARBAMIDE PEROXIDE 6.5 % OT SOLN     PROCTOSOL HC  2.5 % RE CREA     PATADAY 0.2 % OP SOLN     ALBUTEROL SULFATE (2.5 MG/3ML) 0.083% IN NEBU     REPLETE/FIBER OR LIQD     No current facility-administered medications for this visit.        Allergies   Allergen Reactions     No Known Allergies        ROS: could not be obtained 2/2 nonverbal status.      Physical exam:   GENERAL:  A well-appearing man with stigmata of cerebral palsy and mental retardation.  In a wheelchair, pleasant and cooperative with the examination.     SKIN:  Focused examination today of the groin and buttock area is performed.  The patient has very mild, nondescript, poorly demarcated erythema with mild overlying scale in the bilateral inguinal folds and lateral scrotum.  He has negative Wood lamp fluorescence on examination.  There is a somewhat fibrotic, slightly violaceous, slightly indurated plaque on the left buttock without any ulceration or overlying erosions.  No other concerning findings on examination today.         Impression/Plan:   1.  Intertrigo.  Consistent with irritant dermatitis today.  Negative Wood lamp evaluation.  We feel that ongoing urine and stool incontinence as well as wheelchair-bound status is likely contributing.  We will plan to continue with excellent local cares, including a benzoyl peroxide wash with bathing, A&D ointment with changes.    -We will add on Westcort 0.2% cream and econazole 1% cream to be applied to the affected groin sites 3 times weekly as a maintenance.  We will plan to increase to twice daily for up to 2 weeks for flares.    -Handouts were provided.     2.  Pressure ulcer, left buttock, stage 1.  No overlying erosion or ulceration today.  No stigmata of previous superinfection.  Reassuring.    -We will plan to continue with pressure offloading measures; additional pressure offloading cares per [facility] group recommended.    We will continue with Mepilex dressings.        The patient was seen and discussed with Dr. Blayne Bryson, who was staff  for this encounter.  Follow up p.r.n.       This note was dictated and edited by MD Peter Camargo MD  PGY3 Dermatology  [phone]        Staff Involved:   Dictated by Resident(Peter Love MD)/Staff(as above)     I have seen, evaluated and discussed the patient with resident physician. I have reviewed the resident physicians note and agree with their clinical findings, assessment and plan.  Appropriate changes to the resident's note have been made.    Blayne Bryson MD, FAAD    Departments of Internal Medicine and Dermatology  [facility]  [phone]

## 2018-01-04 NOTE — PROGRESS NOTES
[facility] Dermatology Note      Dermatology Problem List:   1.  Intertrigo, groin.  Current treatment includes Westcort, econazole t.i.w. as maintenance, b.i.d. p.r.n. with flares.  Benzoyl peroxide wash with showering, A&D ointment with changes.         2.  Pressure ulcer, stage 1, left posterior buttock.  Pressure offloading measures, Mepilex, cares as above.        Encounter Date:  01/04/2018      CC: F/u ulcer and intertrigo.      History of Present Illness:   Marcus Cevallos is a pleasant, 62-year-old gentleman with a history of cerebral palsy, mental retardation and wheelchair dependence who presents today for followup of irritant dermatitis of the groin/intertrigo as well as a stage 1 pressure ulcer on the left buttock.  The patient was last seen 05/22/2017 by Dr. López Cruz, at which point things were going quite well.  His care provider notes that they no longer are using Westcort or gentamicin ointment.  They are not using any antifungals at this point.  They are continuing with the benzoyl peroxide wash with tub baths to prevent infections, A&D ointment or Vaseline as a skin protectant.  They continue with Mepilex padding to the buttock area that in the past has been open, but has been closed as of recent.  Continuing with every 2 hour repositioning and pressure offloading measures.  Physician cares through [facility].  They are using Nutrashield skin protectant to the jaja area t.i.d. with cares.  No other major concerns today.  The patient has been doing well without fever, chills or sweats.  No issues with recurrent infection.  History of a culture in the past growing Klebsiella pneumonia, Citrobacter freundii complex, Proteus mirabilis, beta-hemolytic strep, group B, and Staph aureus 10/16/2015.        Social History:     The patient is a vulnerable adult.  He is in a care home.        PAST MEDICAL HISTORY:   Past Medical History:   Diagnosis Date     Cataract      Cerebral palsy  (H)      CVA (cerebral vascular accident) (H)      HTN (hypertension)      Hypothyroid      Mental retardation      Obesity, unspecified      Retinal detachment, left      Seizure disorder (H)      Sleep apnea        PAST SURGICAL HISTORY: No past surgical history on file.    FAMILY HISTORY:   Family History   Problem Relation Age of Onset     Unknown/Adopted Mother      Hypertension Father        SOCIAL HISTORY:   Social History   Substance Use Topics     Smoking status: Never Smoker     Smokeless tobacco: Never Used     Alcohol use No       Current Outpatient Prescriptions   Medication     hydrocortisone (WESTCORT) 0.2 % cream     econazole nitrate 1 % cream     benzoyl peroxide 5 % LIQD     levETIRAcetam (KEPPRA) 100 MG/ML solution     valproic acid (DEPAKENE) 250 MG/5ML SYRP syrup     Ascorbic Acid (VITAMIN C PO)     bisacodyl (DULCOLAX) 10 MG suppository     Aloe-Sodium Chloride (AYR SALINE NASAL GEL NA)     ergocalciferol (CALCIFEROL) 8000 UNIT/ML drops     aspirin 81 MG tablet     LEVOTHYROXINE SODIUM 50 MCG OR TABS     CALCIUM CARBONATE 1250 MG/5ML OR SUSP     cetirizine (ZYRTEC) 5 MG CHEW     FYCOMPA 6 MG tablet     diazepam (VALIUM) 5 MG/ML (HIGH CONC) solution     hydrocortisone valerate (WEST-DONA) 0.2 % ointment     levofloxacin (LEVAQUIN) 750 MG tablet     guaiFENesin 400 MG TABS     order for DME     loratadine (CLARITIN) 10 MG tablet     ACETAMINOPHEN PO     polyvinyl alcohol (ARTIFICIAL TEARS) 1.4 % ophthalmic solution     dimethicone (REMEDY NUTRASHIELD) 1 %     alendronate (FOSAMAX) 70 MG tablet     Polyethylene Glycol 3350 (MIRALAX PO)     NYSTATIN EX     CARBAMIDE PEROXIDE 6.5 % OT SOLN     PROCTOSOL HC 2.5 % RE CREA     PATADAY 0.2 % OP SOLN     ALBUTEROL SULFATE (2.5 MG/3ML) 0.083% IN NEBU     REPLETE/FIBER OR LIQD     No current facility-administered medications for this visit.        Allergies   Allergen Reactions     No Known Allergies        ROS: could not be obtained 2/2 nonverbal status.       Physical exam:   GENERAL:  A well-appearing man with stigmata of cerebral palsy and mental retardation.  In a wheelchair, pleasant and cooperative with the examination.     SKIN:  Focused examination today of the groin and buttock area is performed.  The patient has very mild, nondescript, poorly demarcated erythema with mild overlying scale in the bilateral inguinal folds and lateral scrotum.  He has negative Wood lamp fluorescence on examination.  There is a somewhat fibrotic, slightly violaceous, slightly indurated plaque on the left buttock without any ulceration or overlying erosions.  No other concerning findings on examination today.         Impression/Plan:   1.  Intertrigo.  Consistent with irritant dermatitis today.  Negative Wood lamp evaluation.  We feel that ongoing urine and stool incontinence as well as wheelchair-bound status is likely contributing.  We will plan to continue with excellent local cares, including a benzoyl peroxide wash with bathing, A&D ointment with changes.    -We will add on Westcort 0.2% cream and econazole 1% cream to be applied to the affected groin sites 3 times weekly as a maintenance.  We will plan to increase to twice daily for up to 2 weeks for flares.    -Handouts were provided.     2.  Pressure ulcer, left buttock, stage 1.  No overlying erosion or ulceration today.  No stigmata of previous superinfection.  Reassuring.    -We will plan to continue with pressure offloading measures; additional pressure offloading cares per [facility] group recommended.    We will continue with Mepilex dressings.        The patient was seen and discussed with Dr. Blayne Bryson, who was staff for this encounter.  Follow up p.r.n.       This note was dictated and edited by MD Peter Camargo MD  PGY3 Dermatology  [phone]        Staff Involved:   Dictated by Resident(Peter Love MD)/Staff(as above)     I have seen, evaluated and discussed the patient with  resident physician. I have reviewed the resident physicians note and agree with their clinical findings, assessment and plan.  Appropriate changes to the resident's note have been made.    Blayne Bryson MD, FAAD    Departments of Internal Medicine and Dermatology  [facility]  [phone]

## 2018-01-04 NOTE — MR AVS SNAPSHOT
After Visit Summary   1/4/2018    Marcus Cevallos    MRN: [id number]           Patient Information     Date Of Birth          1955        Visit Information        Provider Department      1/4/2018 4:00 PM Blayne Bryson MD [facility] D[facility]        Today's Diagnoses     Decubitus ulcer of left buttock, stage 1    -  1    Intertrigo           Follow-ups after your visit        Your next 10 appointments already scheduled     Feb 27, 2018  1:30 PM CST   DX HIP/PELVIS/SPINE with UCDX1   [facility] ([facility])    [address] [id number] [phone]           Please do not take any of the following 24 hours prior to the day of your exam: vitamins, calcium tablets, antacids.  If possible, please wear clothes without metal (snaps, zippers). A sweatsuit works well.            Mar 23, 2018  2:00 PM CDT   Return Visit with Nanda De La Torre MD   [facility] ([facility])    [address] [place] [address], [address]  [facility] [id number] [phone]            Jun 29, 2018  8:30 AM CDT   Return Visit with Sona Kapoor MD   [facility] ([facility])    [address] [place] [id number] [phone]            Nov 05, 2018  2:00 PM CST   New Visit with Anthony Summers OD   [facility] ([facility])    [address] [facility] [address] [phone]              Who to contact     Please call your clinic at [phone] to:    Ask questions about your health    Make or cancel appointments    Discuss your medicines    Learn about your test results    Speak to your doctor   If you have compliments or concerns about an experience at your clinic, or if you wish to file a complaint, please contact [facility] Patient Relations at [phone] or email us at  [email or web address]         Additional Information About Your Visit        Guverahart Information     Neolanet is an electronic gateway that provides easy, online access to your medical records. With Loomia, you can request a clinic appointment, read your test results, renew a prescription or communicate with your care team.     To sign up for Loomia visit the website at [email or web address]   You will be asked to enter the access code listed below, as well as some personal information. Please follow the directions to create your username and password.     Your access code is: [email or web address]  Expires: 2018  2:48 PM     Your access code will  in 90 days. If you need help or a new code, please contact your [facility] or call [phone] for assistance.        Care EveryWhere ID     This is your Care EveryWhere ID. This could be used by other organizations to access your [place] medical records  [id number]         Blood Pressure from Last 3 Encounters:   17 109/43   17 137/72   17 114/63    Weight from Last 3 Encounters:   17 68 kg (149 lb 14.4 oz)   17 67.8 kg (149 lb 8 oz)   16 67.9 kg (149 lb 9.6 oz)              Today, you had the following     No orders found for display         Today's Medication Changes          These changes are accurate as of: 18 11:59 PM.  If you have any questions, ask your nurse or doctor.               Start taking these medicines.        Dose/Directions    econazole nitrate 1 % cream   Used for:  Intertrigo        Apply 3x weekly to the groin. Okay to use twice daily with flares.   Quantity:  30 g   Refills:  11         These medicines have changed or have updated prescriptions.        Dose/Directions    * hydrocortisone valerate 0.2 % ointment   Commonly known as:  WEST-DONA   This may have changed:  Another medication with the same name was added. Make sure you understand how and when  to take each.   Used for:  Irritant dermatitis        Apply to the area once a day.   Quantity:  45 g   Refills:  0       * hydrocortisone 0.2 % cream   Commonly known as:  WESTCORT   This may have changed:  You were already taking a medication with the same name, and this prescription was added. Make sure you understand how and when to take each.   Used for:  Intertrigo        Apply 3 x weekly to the groin area.   Quantity:  60 g   Refills:  4       * Notice:  This list has 2 medication(s) that are the same as other medications prescribed for you. Read the directions carefully, and ask your doctor or other care provider to review them with you.         Where to get your medicines      These medications were sent to [facility] - [address] [facility] [address]     Phone:  [phone]     econazole nitrate 1 % cream    hydrocortisone 0.2 % cream                Primary Care Provider Office Phone # Fax #    Maria Eugenia WALTER Dowell MD, [phone]       [facility] [address] [facility] [address]  [facility] [address]        Equal Access to Services     [facility]: Hadii aad ku hadasho Soomaali, waaxda luqadaha, qaybta kaalmada adeegyada, serena canales . So [facility] [phone].    ATENCIÓN: Si habla español, tiene a wells disposición servicios gratuitos de asistencia lingüística. [facility] [phone].    We comply with applicable federal civil rights laws and [place] laws. We do not discriminate on the basis of race, color, national origin, age, disability, sex, sexual orientation, or gender identity.            Thank you!     Thank you for choosing [facility] DERMATOLOGY  for your care. Our goal is always to provide you with excellent care. Hearing back from our patients is one way we can continue to improve our services. Please take a few minutes to complete the written survey that you may receive in the mail  after your visit with us. Thank you!             Your Updated Medication List - Protect others around you: Learn how to safely use, store and throw away your medicines at www.disposemymeds.org.          This list is accurate as of: 1/4/18 11:59 PM.  Always use your most recent med list.                   Brand Name Dispense Instructions for use Diagnosis    ACETAMINOPHEN PO      325 mg by Oral or G tube route as needed for pain        albuterol (2.5 MG/3ML) 0.083% neb solution      as needed        alendronate 70 MG tablet    FOSAMAX    13 tablet    Take 1 tablet every 7 days via G tube. Take with water and without food or other medication for at least 30 minutes.    Osteoporosis       aspirin 81 MG tablet      Take 1 tablet by mouth daily. (*).        AYR SALINE NASAL GEL NA           benzoyl peroxide 5 % Liqd     140 g    Use as an anti-microbial wash in the tub every 2 days.    Irritant dermatitis       bisacodyl 10 MG Suppository    DULCOLAX     Place 10 mg rectally daily as needed for constipation        CALCIFEROL 8000 UNIT/ML drops   Generic drug:  ergocalciferol     30 mL    12 drops per g tube twice daily        calcium carbonate 1250 (500 CA) MG/5ML Susp suspension     90 days    1 teaspoon down G tube daily    Cerebral palsy (H)       carbamide peroxide 6.5 % otic solution    DEBROX     as needed        cetirizine 5 MG Chew    zyrTEC     Take 5 mg by mouth daily        diazepam 5 MG/ML (HIGH CONC) solution    VALIUM    30 mL    Take 1 ml (5mg) buccally as needed for a convulsive seizure >1 min, 3 min of a non-convulsive seizure or 2nd non-convulsive seizure .Each bottle needs to last 90 days!    Intractable generalized tonic-clonic epilepsy (H)       econazole nitrate 1 % cream     30 g    Apply 3x weekly to the groin. Okay to use twice daily with flares.    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    Irritant dermatitis       * hydrocortisone 0.2 % cream    WESTCORT    60 g    Apply 3 x weekly to the groin area.    Intertrigo       levETIRAcetam 100 MG/ML solution    KEPPRA    775 mL    GIVE 12.5ML (1250MG) PER G-TUBE TWICE DAILY    Seizure disorder (H)       levofloxacin 750 MG tablet    LEVAQUIN     Take by mouth daily 1 tab for 5 days - D/C 1.27.17        levothyroxine 50 MCG tablet    SYNTHROID/LEVOTHROID     1 TABLET DAILY        loratadine 10 MG tablet    CLARITIN     Take 10 mg by mouth daily        MIRALAX PO      Take 8.5 mg by mouth daily        NYSTATIN EX      Externally apply topically as needed        order for DME     1 each    Equipment being ordered: Wheel Chair Cushion    Skin erosion       PATADAY 0.2 % Soln   Generic drug:  olopatadine HCl      None Entered        polyvinyl alcohol 1.4 % ophthalmic solution    ARTIFICIAL TEARS    6 mL    Instill one drop into both eyes 3 times a day.    Keratitis sicca, bilateral       PROCTOSOL HC 2.5 % cream   Generic drug:  hydrocortisone      as needed        REMEDY NUTRASHIELD 1 %   Generic drug:  dimethicone           REPLETE/FIBER Liqd      1 250cc CAN PER GT 4X/DAY WITH 325CC WATER 4X/DAY=5.5 cans total    Obesity       valproic acid 250 MG/5ML Syrp syrup    DEPAKENE    620 mL    TAKE 10ML (500MG) PER G-TUBE TWICE DAILY.    Seizure disorder (H)       VITAMIN C PO      Take 500 mg by mouth daily        * Notice:  This list has 2 medication(s) that are the same as other medications prescribed for you. Read the directions carefully, and ask your doctor or other care provider to review them with you.

## 2018-01-04 NOTE — NURSING NOTE
Dermatology Rooming Note    Marcus Cevallos's goals for this visit include:   Chief Complaint   Patient presents with     Derm Problem     Wound follow up, Dash urbina notes there is still a pressure sore present.     Kanchan Sam LPN

## 2018-02-14 DIAGNOSIS — G40.319 GENERALIZED CONVULSIVE EPILEPSY WITH INTRACTABLE EPILEPSY (H): ICD-10-CM

## 2018-02-14 DIAGNOSIS — G40.909 SEIZURE DISORDER (H): ICD-10-CM

## 2018-02-15 RX ORDER — PERAMPANEL 6 MG/1
TABLET ORAL
Qty: 31 TABLET | Refills: 0 | Status: SHIPPED | OUTPATIENT
Start: 2018-02-15 | End: 2018-03-23

## 2018-02-19 DIAGNOSIS — G40.419 INTRACTABLE GENERALIZED TONIC-CLONIC EPILEPSY (H): ICD-10-CM

## 2018-02-20 RX ORDER — DIAZEPAM INTENSOL 5 MG/ML
SOLUTION, CONCENTRATE ORAL
Qty: 30 ML | Refills: 1 | Status: SHIPPED | OUTPATIENT
Start: 2018-02-20 | End: 2018-10-10

## 2018-03-02 ENCOUNTER — TELEPHONE (OUTPATIENT)
Dept: FAMILY MEDICINE | Facility: CLINIC | Age: 63
End: 2018-03-02

## 2018-03-02 NOTE — TELEPHONE ENCOUNTER
Panel Management Review      BP Readings from Last 1 Encounters:   06/30/17 109/43    , No results found for: A1C, Visit date not found  Last Office Visit with this department: Visit date not found    Fail List measure: Colon cancer screening      Patient is due/failing the following:   COLONOSCOPY    Action needed:   Schedule colonoscopy    Type of outreach:    Sent letter.    Questions for provider review:    None                                                                                                                                    Simone Guthrie      Chart routed to NA .

## 2018-03-02 NOTE — LETTER
March 2, 2018        Marcus Cevallos  [address] [address]        Dear Marcus,     At [facility] we care about your health and are committed to providing quality patient care. Which includes staying current on preventive cancer screenings.  You can increase your chances of finding and treating cancers through regular screenings.      Our records indicate you may be due for the following preventive screening(s):    Colonoscopy  Colonoscopy is recommended every ten years for everyone age 50 and older. Please take a moment to read over the enclosed information packet about colon cancer screening. We strongly urge our patient's to consider having a colonoscopy done, which is the best screening test available and only needs to be done every 10 years if normal. If you are unwilling or unable to have a colonoscopy then we recommend the annual stool testing for blood. This test is called a FIT test and it looks for blood in the stool.     To schedule an appointment or discuss this screening further, you may contact us by phone at the [facility] at [phone] or online through the patient portal/[email or web address] @ [email or web address]    If you have had any of the screenings listed above at another facility, please call us so that we may update your chart.      Your partners in health,          Quality Committee at [facility]

## 2018-03-07 ENCOUNTER — RADIANT APPOINTMENT (OUTPATIENT)
Dept: BONE DENSITY | Facility: CLINIC | Age: 63
End: 2018-03-07
Attending: INTERNAL MEDICINE
Payer: MEDICARE

## 2018-03-07 DIAGNOSIS — M81.0 SENILE OSTEOPOROSIS: ICD-10-CM

## 2018-03-23 ENCOUNTER — OFFICE VISIT (OUTPATIENT)
Dept: NEUROLOGY | Facility: CLINIC | Age: 63
End: 2018-03-23
Payer: MEDICARE

## 2018-03-23 VITALS — RESPIRATION RATE: 16 BRPM | DIASTOLIC BLOOD PRESSURE: 70 MMHG | TEMPERATURE: 97 F | SYSTOLIC BLOOD PRESSURE: 112 MMHG

## 2018-03-23 DIAGNOSIS — G40.909 SEIZURE DISORDER (H): ICD-10-CM

## 2018-03-23 DIAGNOSIS — G40.319 GENERALIZED CONVULSIVE EPILEPSY WITH INTRACTABLE EPILEPSY (H): ICD-10-CM

## 2018-03-23 RX ORDER — LEVETIRACETAM 100 MG/ML
SOLUTION ORAL
Qty: 775 ML | Refills: 11 | Status: SHIPPED | OUTPATIENT
Start: 2018-03-23 | End: 2019-03-26

## 2018-03-23 NOTE — PROGRESS NOTES
INTERVAL HX: Now on 6 mg perampanel HS and doing very well. Now only approximately 0-3 sz per mo; no increase in sedation.  Prior to Admission medications    Medication Sig Start Date End Date Taking? Authorizing Provider   perampanel (FYCOMPA) 6 MG tablet TAKE 1 TABLET PER G-TUBE AT BEDTIME (SIGN NARC BOOK) **6 TOTAL FILLS* 3/23/18  Yes Nanda De La Torre MD   levETIRAcetam (KEPPRA) 100 MG/ML solution GIVE 12.5ML (1250MG) PER G-TUBE TWICE DAILY 3/23/18  Yes Nanda De La Torre MD   valproic acid (DEPAKENE) 250 MG/5ML SOLN syrup TAKE 10ML (500MG) PER G-TUBE TWICE DAILY 3/23/18  Yes Nanda De La Torre MD   DIAZEPAM INTENSOL 5 MG/ML (HIGH CONC) solution TAKE 1ML (5MG) BUCCALLY AS NEEDED FOR CONVULSIVE SEIZURE >1 MINUTE, 3 MINUTES OF A NON-CONVULSIVE SEIZURE OR 2ND NON-CONVULSIVE SEIZURE (WIT 2/20/18  Yes Nanda De La Torre MD   hydrocortisone (WESTCORT) 0.2 % cream Apply 3 x weekly to the groin area. 1/4/18  Yes Blayne Bryson MD   econazole nitrate 1 % cream Apply 3x weekly to the groin. Okay to use twice daily with flares. 1/4/18  Yes Blayne Bryson MD   cetirizine (ZYRTEC) 5 MG CHEW Take 5 mg by mouth daily   Yes Reported, Patient   benzoyl peroxide 5 % LIQD Use as an anti-microbial wash in the tub every 2 days. 4/24/17  Yes López Cruz MD   hydrocortisone valerate (WEST-DONA) 0.2 % ointment Apply to the area once a day. 4/24/17  Yes López Cruz MD   levofloxacin (LEVAQUIN) 750 MG tablet Take by mouth daily 1 tab for 5 days - D/C 1.27.17   Yes Reported, Patient   guaiFENesin 400 MG TABS Per packet instructions as needed   Yes Reported, Patient   order for DME Equipment being ordered: Wheel Chair Cushion 1/24/17  Yes Pastora Peterson MD   Ascorbic Acid (VITAMIN C PO) Take 500 mg by mouth daily   Yes Reported, Patient   loratadine (CLARITIN) 10 MG tablet Take 10 mg by mouth daily   Yes Reported, Patient   bisacodyl (DULCOLAX) 10 MG suppository Place 10 mg rectally daily as needed for constipation   Yes Reported,  Patient   ACETAMINOPHEN  mg by Oral or G tube route as needed for pain   Yes Reported, Patient   polyvinyl alcohol (ARTIFICIAL TEARS) 1.4 % ophthalmic solution Instill one drop into both eyes 3 times a day. 11/13/15  Yes Jen Hartman OD   dimethicone (REMEDY NUTRASHIELD) 1 %    Yes Reported, Patient   Aloe-Sodium Chloride (AYR SALINE NASAL GEL NA)    Yes Reported, Patient   alendronate (FOSAMAX) 70 MG tablet Take 1 tablet every 7 days via G tube. Take with water and without food or other medication for at least 30 minutes. 6/6/14  Yes Junito Villanueva MD   ergocalciferol (CALCIFEROL) 8000 UNIT/ML drops 12 drops per g tube twice daily 6/6/14  Yes Junito Villanueva MD   Polyethylene Glycol 3350 (MIRALAX PO) Take 8.5 mg by mouth daily    Yes Reported, Patient   NYSTATIN EX Externally apply topically as needed    Yes Reported, Patient   aspirin 81 MG tablet Take 1 tablet by mouth daily. (*).  11/20/10  Yes Rosa Maria Menjivar MD   CARBAMIDE PEROXIDE 6.5 % OT SOLN as needed   Yes Reported, Patient   PROCTOSOL HC 2.5 % RE CREA as needed   Yes Reported, Patient   PATADAY 0.2 % OP SOLN None Entered   Yes Reported, Patient   ALBUTEROL SULFATE (2.5 MG/3ML) 0.083% IN NEBU as needed   Yes Reported, Patient   REPLETE/FIBER OR LIQD 1 250cc CAN PER GT 4X/DAY WITH 325CC WATER 4X/DAY=5.5 cans total 11/17/09  Yes Rosa Maria Menjivar MD   LEVOTHYROXINE SODIUM 50 MCG OR TABS 1 TABLET DAILY   Yes Reported, Patient   CALCIUM CARBONATE 1250 MG/5ML OR SUSP 1 teaspoon down G tube daily 11/4/09  Yes Rosa Maria Menjivar MD       SEIZURE HISTORY REVIEWED 3/23/18:  Marcus is a man, who has been a [facility] patient for many years.  This is his annual visit for evaluation of seizure control.  He is in a group home.  He is having his baseline number of seizures overall; but the group home feels that, if anything, he has improved somewhat.  They have fairly extensive records of seizures.  He has nonconvulsive seizures, approximately 15-20 per  month.  These are very brief jerks.  He also has occasional generalized convulsive seizures, but these actually have not occurred during the last year.     He had his 1st seizure at age 7, but this is on a baseline of very severe issues.  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  He has a G tube in place for many years because of frequent aspiration pneumonias.  He had a stroke in the 1980s and subsequently has been unable to speak.  He has also had hemorrhoids, left ankle fracture and hip surgery.      FAMILY HISTORY:  Includes a sister, who has tuberous sclerosis.        He has been reliant on Diazepam Intensol through the G tube for clusters of seizures.  The diazepam is very helpful, and he has had no emergency room visits in the last year.      SOCIAL HISTORY:  He has been residing in a group home now for many years and appears to be well cared for.      REVIEW OF SYSTEMS:  Through the group home.     He has had no fevers, no pneumonias, no cardiac issues, no issues with blood pressure, no significant issues with  other than needs to be fed through a gastric tube.   :  Is incontinent.     MUSCULOSKELETAL:  No major issues noted.      PHYSICAL EXAMINATION:  Blood pressure 112/70, temperature 97  F (36.1  C), resp. rate 16.     He is completely wheelchair-bound.  He has a small body size with foreshortened upper and lower extremities consistent with early childhood cerebral palsy.  He has a feeding tube in place.      Attention Span:  Nonresponsive verbally, minimally responsive to strong stimuli.  In wheelchair, he does have roving eye movements and not clear whether he is actually fixating.      Mild spasticity of upper and lower extremities.      ASSESSMENT:  He was  deteriorating.    Adding perampanel has resulted in a significant reduction in sz. We  have D/C ed topriamate.     PLAN:     1.  Continue  perampanel 6 mg hs  2. RTC 1 year

## 2018-03-23 NOTE — MR AVS SNAPSHOT
After Visit Summary   3/23/2018    Marcus Cevallos    MRN: [id number]           Patient Information     Date Of Birth          1955        Visit Information        Provider Department      3/23/2018 2:00 PM Nanda De La Torre MD [facility]         Follow-ups after your visit        Your next 10 appointments already scheduled     2018  3:00 PM CDT   [facility]   [facility]    [address] [address] [place] [address] [id number] [phone]            2018  2:00 PM CST   New Visit with Anthony Summers OD   [facility]    [address] [facility] [address] [phone]              Who to contact     Please call your clinic at [phone] to:    Ask questions about your health    Make or cancel appointments    Discuss your medicines    Learn about your test results    Speak to your doctor            Additional Information About Your Visit        MyChart Information     PressBaby is an electronic gateway that provides easy, online access to your medical records. With PressBaby, you can request a clinic appointment, read your test results, renew a prescription or communicate with your care team.     To sign up for PressBaby visit the website at [email or web address]   You will be asked to enter the access code listed below, as well as some personal information. Please follow the directions to create your username and password.     Your access code is: [email or web address]  Expires: 2018  7:30 AM     Your access code will  in 90 days. If you need help or a new code, please contact your [facility] or call [phone] for assistance.        Care EveryWhere ID     This is your Care EveryWhere ID. This could be used by other organizations to access your [facility] medical records  [id number]        Your Vitals  Were     Temperature Respirations                97  F (36.1  C) 16           Blood Pressure from Last 3 Encounters:   03/23/18 112/70   06/30/17 109/43   04/21/17 137/72    Weight from Last 3 Encounters:   04/21/17 149 lb 14.4 oz (68 kg)   02/05/17 149 lb 8 oz (67.8 kg)   12/09/16 149 lb 9.6 oz (67.9 kg)              Today, you had the following     No orders found for display       Primary Care Provider Office Phone # Fax #    Maria Eugenia Sarah Dowell [phone]       [facility] PHYSICIAN [facility] [address]  [facility] [address]        Equal Access to Services     JEY MCCALL : Hadii dmitry Mcdermott, wasaniyada lalita, qaybta kaalmada krish, serena canales . So [facility] [place] [phone].    ATENCIÓN: Si habla español, tiene a wells disposición servicios gratuitos de asistencia lingüística. Llame al [phone].    We comply with applicable federal civil rights laws and [place] laws. We do not discriminate on the basis of race, color, national origin, age, disability, sex, sexual orientation, or gender identity.            Thank you!     Thank you for choosing [facility]  for your care. Our goal is always to provide you with excellent care. Hearing back from our patients is one way we can continue to improve our services. Please take a few minutes to complete the written survey that you may receive in the mail after your visit with us. Thank you!             Your Updated Medication List - Protect others around you: Learn how to safely use, store and throw away your medicines at www.disposemymeds.org.          This list is accurate as of 3/23/18  2:30 PM.  Always use your most recent med list.                   Brand Name Dispense Instructions for use Diagnosis    ACETAMINOPHEN PO      325 mg by Oral or G tube route as needed for pain        albuterol (2.5 MG/3ML) 0.083% neb solution      as needed        alendronate 70 MG tablet    FOSAMAX    13  tablet    Take 1 tablet every 7 days via G tube. Take with water and without food or other medication for at least 30 minutes.    Osteoporosis       aspirin 81 MG tablet      Take 1 tablet by mouth daily. (*).        AYR SALINE NASAL GEL NA           benzoyl peroxide 5 % Liqd     140 g    Use as an anti-microbial wash in the tub every 2 days.    Irritant dermatitis       bisacodyl 10 MG Suppository    DULCOLAX     Place 10 mg rectally daily as needed for constipation        CALCIFEROL 8000 UNIT/ML drops   Generic drug:  ergocalciferol     30 mL    12 drops per g tube twice daily        calcium carbonate 1250 (500 CA) MG/5ML Susp suspension     90 days    1 teaspoon down G tube daily    Cerebral palsy (H)       carbamide peroxide 6.5 % otic solution    DEBROX     as needed        cetirizine 5 MG Chew    zyrTEC     Take 5 mg by mouth daily        DIAZEPAM INTENSOL 5 MG/ML (HIGH CONC) solution   Generic drug:  diazepam     30 mL    TAKE 1ML (5MG) BUCCALLY AS NEEDED FOR CONVULSIVE SEIZURE >1 MINUTE, 3 MINUTES OF A NON-CONVULSIVE SEIZURE OR 2ND NON-CONVULSIVE SEIZURE (WIT    Intractable generalized tonic-clonic epilepsy (H)       econazole nitrate 1 % cream     30 g    Apply 3x weekly to the groin. Okay to use twice daily with flares.    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    Irritant dermatitis       * hydrocortisone 0.2 % cream    WESTCORT    60 g    Apply 3 x weekly to the groin area.    Intertrigo       levETIRAcetam 100 MG/ML solution    KEPPRA    775 mL    GIVE 12.5ML (1250MG) PER G-TUBE TWICE DAILY    Seizure disorder (H)       levofloxacin 750 MG tablet    LEVAQUIN      Take by mouth daily 1 tab for 5 days - D/C 1.27.17        levothyroxine 50 MCG tablet    SYNTHROID/LEVOTHROID     1 TABLET DAILY        loratadine 10 MG tablet    CLARITIN     Take 10 mg by mouth daily        MIRALAX PO      Take 8.5 mg by mouth daily        NYSTATIN EX      Externally apply topically as needed        order for DME     1 each    Equipment being ordered: Wheel Chair Cushion    Skin erosion       PATADAY 0.2 % Soln   Generic drug:  olopatadine HCl      None Entered        polyvinyl alcohol 1.4 % ophthalmic solution    ARTIFICIAL TEARS    6 mL    Instill one drop into both eyes 3 times a day.    Keratitis sicca, bilateral       PROCTOSOL HC 2.5 % cream   Generic drug:  hydrocortisone      as needed        REMEDY NUTRASHIELD 1 %   Generic drug:  dimethicone           REPLETE/FIBER Liqd      1 250cc CAN PER GT 4X/DAY WITH 325CC WATER 4X/DAY=5.5 cans total    Obesity       valproic acid 250 MG/5ML Soln syrup    DEPAKENE    620 mL    TAKE 10ML (500MG) PER G-TUBE TWICE DAILY    Seizure disorder (H)       VITAMIN C PO      Take 500 mg by mouth daily        * Notice:  This list has 2 medication(s) that are the same as other medications prescribed for you. Read the directions carefully, and ask your doctor or other care provider to review them with you.

## 2018-03-23 NOTE — LETTER
3/23/2018       RE: Marcus Cevallos  : 1955   MRN: [id number]      Dear Colleague,    Thank you for referring your patient, Marcus Cevallos, to the [facility] at [facility]. Please see a copy of my visit note below.    INTERVAL HX: Now on 6 mg perampanel HS and doing very well. Now only approximately 0-3 sz per mo; no increase in sedation.  Prior to Admission medications    Medication Sig Start Date End Date Taking? Authorizing Provider   perampanel (FYCOMPA) 6 MG tablet TAKE 1 TABLET PER G-TUBE AT BEDTIME (SIGN NARC BOOK) **6 TOTAL FILLS* 3/23/18  Yes Nanda De La oTrre MD   levETIRAcetam (KEPPRA) 100 MG/ML solution GIVE 12.5ML (1250MG) PER G-TUBE TWICE DAILY 3/23/18  Yes Nanda De La Torre MD   valproic acid (DEPAKENE) 250 MG/5ML SOLN syrup TAKE 10ML (500MG) PER G-TUBE TWICE DAILY 3/23/18  Yes Nanda De La Torre MD   DIAZEPAM INTENSOL 5 MG/ML (HIGH CONC) solution TAKE 1ML (5MG) BUCCALLY AS NEEDED FOR CONVULSIVE SEIZURE >1 MINUTE, 3 MINUTES OF A NON-CONVULSIVE SEIZURE OR 2ND NON-CONVULSIVE SEIZURE (WIT 18  Yes Nanda De La Torre MD   hydrocortisone (WESTCORT) 0.2 % cream Apply 3 x weekly to the groin area. 18  Yes Blayne Bryson MD   econazole nitrate 1 % cream Apply 3x weekly to the groin. Okay to use twice daily with flares. 18  Yes Blayne Bryson MD   cetirizine (ZYRTEC) 5 MG CHEW Take 5 mg by mouth daily   Yes Reported, Patient   benzoyl peroxide 5 % LIQD Use as an anti-microbial wash in the tub every 2 days. 17  Yes López Cruz MD   hydrocortisone valerate (WEST-DONA) 0.2 % ointment Apply to the area once a day. 17  Yes López Cruz MD   levofloxacin (LEVAQUIN) 750 MG tablet Take by mouth daily 1 tab for 5 days - D/C 17   Yes Reported, Patient   guaiFENesin 400 MG TABS Per packet instructions as needed   Yes Reported, Patient   order for DME Equipment being ordered: Wheel Chair Cushion 17  Yes Pastora Peterson MD    Ascorbic Acid (VITAMIN C PO) Take 500 mg by mouth daily   Yes Reported, Patient   loratadine (CLARITIN) 10 MG tablet Take 10 mg by mouth daily   Yes Reported, Patient   bisacodyl (DULCOLAX) 10 MG suppository Place 10 mg rectally daily as needed for constipation   Yes Reported, Patient   ACETAMINOPHEN  mg by Oral or G tube route as needed for pain   Yes Reported, Patient   polyvinyl alcohol (ARTIFICIAL TEARS) 1.4 % ophthalmic solution Instill one drop into both eyes 3 times a day. 11/13/15  Yes Jen Hartman, KRISHNA   dimethicone (REMEDY NUTRASHIELD) 1 %    Yes Reported, Patient   Aloe-Sodium Chloride (AYR SALINE NASAL GEL NA)    Yes Reported, Patient   alendronate (FOSAMAX) 70 MG tablet Take 1 tablet every 7 days via G tube. Take with water and without food or other medication for at least 30 minutes. 6/6/14  Yes Junito Villanueva MD   ergocalciferol (CALCIFEROL) 8000 UNIT/ML drops 12 drops per g tube twice daily 6/6/14  Yes Junito Villanueva MD   Polyethylene Glycol 3350 (MIRALAX PO) Take 8.5 mg by mouth daily    Yes Reported, Patient   NYSTATIN EX Externally apply topically as needed    Yes Reported, Patient   aspirin 81 MG tablet Take 1 tablet by mouth daily. (*).  11/20/10  Yes Rosa Maria Menjivar MD   CARBAMIDE PEROXIDE 6.5 % OT SOLN as needed   Yes Reported, Patient   PROCTOSOL HC 2.5 % RE CREA as needed   Yes Reported, Patient   PATADAY 0.2 % OP SOLN None Entered   Yes Reported, Patient   ALBUTEROL SULFATE (2.5 MG/3ML) 0.083% IN NEBU as needed   Yes Reported, Patient   REPLETE/FIBER OR LIQD 1 250cc CAN PER GT 4X/DAY WITH 325CC WATER 4X/DAY=5.5 cans total 11/17/09  Yes Rosa Maria Menjivar MD   LEVOTHYROXINE SODIUM 50 MCG OR TABS 1 TABLET DAILY   Yes Reported, Patient   CALCIUM CARBONATE 1250 MG/5ML OR SUSP 1 teaspoon down G tube daily 11/4/09  Yes Rosa Maria Menjivar MD       SEIZURE HISTORY REVIEWED 3/23/18:  Marcus is a man, who has been a [facility] patient for many years.  This is his annual visit for  evaluation of seizure control.  He is in a group home.  He is having his baseline number of seizures overall; but the group home feels that, if anything, he has improved somewhat.  They have fairly extensive records of seizures.  He has nonconvulsive seizures, approximately 15-20 per month.  These are very brief jerks.  He also has occasional generalized convulsive seizures, but these actually have not occurred during the last year.     He had his 1st seizure at age 7, but this is on a baseline of very severe issues.  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  He has a G tube in place for many years because of frequent aspiration pneumonias.  He had a stroke in the 1980s and subsequently has been unable to speak.  He has also had hemorrhoids, left ankle fracture and hip surgery.      FAMILY HISTORY:  Includes a sister, who has tuberous sclerosis.        He has been reliant on Diazepam Intensol through the G tube for clusters of seizures.  The diazepam is very helpful, and he has had no emergency room visits in the last year.      SOCIAL HISTORY:  He has been residing in a group home now for many years and appears to be well cared for.      REVIEW OF SYSTEMS:  Through the group home.     He has had no fevers, no pneumonias, no cardiac issues, no issues with blood pressure, no significant issues with  other than needs to be fed through a gastric tube.   :  Is incontinent.     MUSCULOSKELETAL:  No major issues noted.      PHYSICAL EXAMINATION:  Blood pressure 112/70, temperature 97  F (36.1  C), resp. rate 16.     He is completely wheelchair-bound.  He has a small body size with foreshortened upper and lower extremities consistent with early childhood cerebral palsy.  He has a feeding tube in place.      Attention Span:  Nonresponsive verbally, minimally  responsive to strong stimuli.  In wheelchair, he does have roving eye movements and not clear whether he is actually fixating.      Mild spasticity of upper and lower extremities.      ASSESSMENT:  He was  deteriorating.    Adding perampanel has resulted in a significant reduction in sz. We have D/C ed topriamate.     PLAN:     1.  Continue  perampanel 6 mg hs  2. RTC 1 year           Sincerely,    Nanda De La Torre MD

## 2018-04-19 ENCOUNTER — TELEPHONE (OUTPATIENT)
Dept: DERMATOLOGY | Facility: CLINIC | Age: 63
End: 2018-04-19

## 2018-04-19 NOTE — TELEPHONE ENCOUNTER
Writer called [facility] home at [phone] and spoke with the Butch the home supervisor. Patient scheduled appointment to 7/10/18 at 3:45pm. Cancelling October appointment.    Natali Philip LPN

## 2018-04-19 NOTE — TELEPHONE ENCOUNTER
4.19.18  Patient was told to come for 6 month f/u in July 2018. He is scheduled now in October.  Pt asked for a message to go to Dr Peterson to see if she could see him in July. Please call [phone]

## 2018-05-21 ENCOUNTER — TELEPHONE (OUTPATIENT)
Dept: ENDOCRINOLOGY | Facility: CLINIC | Age: 63
End: 2018-05-21

## 2018-05-21 NOTE — TELEPHONE ENCOUNTER
Patient scheduled with Dr. Kapoor on 6/8/18. Due to Dr. Kapoor going on leave until mid September, appointment needs to be rescheduled. Per Dr. Kapoor ok to schedule with anyone if needing appointment over summer or ok to postpone until back in clinic. Can also review any questions with Dr. Kapoor by phone.     Left voicemail with staff for patient's group home.       Parris Valencia RN  Endocrine Care Coordinator  [facility]

## 2018-05-28 RX ORDER — ZOLEDRONIC ACID 5 MG/100ML
5 INJECTION, SOLUTION INTRAVENOUS ONCE
Status: CANCELLED
Start: 2018-05-28 | End: 2018-05-28

## 2018-05-29 NOTE — TELEPHONE ENCOUNTER
Also per Dr. Kapoor:    Could you communicate his facility staff and patient that he needs Reclast for severe osteoporosis, if he has question, I am happy to talk.    Sona

## 2018-05-29 NOTE — PROGRESS NOTES
Dual energy x-ray absorptiometry results:      Region BMD T - score Z - score   L1-L2 1.058 g/cm  -1.2 -0.5             Neck 0.000 g/cm  N/A N/A   Total 0.005 g/cm  -7.6 -6.9               Severe osteoporosis and immobilization. Cr , GFR ok, will do Reclast.    Sona Kapoor MD  Staff Physician  Endocrinology and Metabolism  [facility]  License: [place] [id number]  Pager: [phone]

## 2018-06-04 NOTE — TELEPHONE ENCOUNTER
Supervisor Butch returned call. Patient is stable. Rescheduled appt to 10/19/18. Butch would like to check with family regarding coverage for Reclast before scheduling. Direct number provided to nurse to schedule.    Lizy Coats LPN  Adult Endocrinology  [facility]

## 2018-06-04 NOTE — TELEPHONE ENCOUNTER
Have not heard back from  listed in demographics. Contacted group home directly and reviewed with staff, Bogdan. He states the number listed to call, [phone], is correct. He notes that Bradly is on vacation and requested that writer call [phone], to speak with Mikayla.     Attempted to contact Mikayla and received voicemail. Message left.    Attempted to contact nurse in chart ([phone]) and was advised number for patient's nurse is [phone] (Isis). Isis confirms that she is the nurse manger for patient. While talking with Butch Marinelli, returned call and spoke with nursing staff to reschedule appointment with Dr. Kapoor.       Parris Valencia, RN  Endocrine Care Coordinator  [facility]

## 2018-07-10 ENCOUNTER — OFFICE VISIT (OUTPATIENT)
Dept: DERMATOLOGY | Facility: CLINIC | Age: 63
End: 2018-07-10
Payer: MEDICARE

## 2018-07-10 DIAGNOSIS — L01.00 IMPETIGO: Primary | ICD-10-CM

## 2018-07-10 DIAGNOSIS — L89.90: ICD-10-CM

## 2018-07-10 DIAGNOSIS — B37.2 CANDIDAL INTERTRIGO: ICD-10-CM

## 2018-07-10 PROCEDURE — 99213 OFFICE O/P EST LOW 20 MIN: CPT | Performed by: DERMATOLOGY

## 2018-07-10 RX ORDER — MUPIROCIN 20 MG/G
OINTMENT TOPICAL
Qty: 60 G | Refills: 0 | Status: SHIPPED | OUTPATIENT
Start: 2018-07-10 | End: 2018-07-31

## 2018-07-10 RX ORDER — NYSTATIN 100000 U/G
OINTMENT TOPICAL
Qty: 120 G | Refills: 1 | Status: SHIPPED | OUTPATIENT
Start: 2018-07-10 | End: 2019-05-24

## 2018-07-10 NOTE — NURSING NOTE
Dermatology Rooming Note    Marcus Cevallos's goals for this visit include:   Chief Complaint   Patient presents with     Skin Ulcer     left buttock pressure ulcer doing well today- Ketaconazole, hydrocortisone and mepalex bandages and AED ointment with brief changes BP wash with showers       Is a scribe okay for this visit:YES    Are records needed for this visit(If yes, obtain release of information): No     Vitals: There were no vitals taken for this visit.    Referring Provider:  No referring provider defined for this encounter.    Naa Guardado LPN

## 2018-07-10 NOTE — MR AVS SNAPSHOT
After Visit Summary   7/10/2018    Marcus Cevallos    MRN: [id number]           Patient Information     Date Of Birth          1955        Visit Information        Provider Department      7/10/2018 3:45 PM Pastora Peterson MD [facility]        Today's Diagnoses     Impetigo    -  1    Candidal intertrigo        Healing decubitus ulcer, unspecified ulcer stage          Care Instructions              Follow-ups after your visit        Your next 10 appointments already scheduled     Aug 10, 2018  3:45 PM CDT   Return Visit with Pastora Peterson MD   [facility]    [address] [place] [id number]   [phone]            Oct 19, 2018  3:30 PM CDT   Return Visit with Sona Kapoor MD   [facility] ([facility])    [address] [place] [id number]   [phone]            Nov 05, 2018  2:00 PM CST   New Visit with Anthony Summers OD   [facility]    [address] [facility] [id number] [phone]              Who to contact     If you have questions or need follow up information about today's clinic visit or your schedule please contact [facility] directly at [phone].  Normal or non-critical lab and imaging results will be communicated to you by MyChart, letter or phone within 4 business days after the clinic has received the results. If you do not hear from us within 7 days, please contact the clinic through MyChart or phone. If you have a critical or abnormal lab result, we will notify you by phone as soon as possible.  Submit refill requests through [employer] or call your pharmacy and they will forward the refill request to us. Please allow 3 business days for your refill to be completed.          Additional Information About Your Visit        MyChart Information     [employer] is an  electronic gateway that provides easy, online access to your medical records. With [employer], you can request a clinic appointment, read your test results, renew a prescription or communicate with your care team.     To sign up for [employer] visit the website at [email or web address]   You will be asked to enter the access code listed below, as well as some personal information. Please follow the directions to create your username and password.     Your access code is: [email or web address]  Expires: 10/8/2018  4:42 PM     Your access code will  in 90 days. If you need help or a new code, please contact your [facility] or call [phone] for assistance.        Care EveryWhere ID     This is your Care EveryWhere ID. This could be used by other organizations to access your [facility] medical records  [id number]         Blood Pressure from Last 3 Encounters:   18 112/70   17 109/43   17 137/72    Weight from Last 3 Encounters:   17 68 kg (149 lb 14.4 oz)   17 67.8 kg (149 lb 8 oz)   16 67.9 kg (149 lb 9.6 oz)              Today, you had the following     No orders found for display         Today's Medication Changes          These changes are accurate as of 7/10/18  4:48 PM.  If you have any questions, ask your nurse or doctor.               Start taking these medicines.        Dose/Directions    mupirocin 2 % ointment   Commonly known as:  BACTROBAN   Used for:  Impetigo, Healing decubitus ulcer, unspecified ulcer stage   Started by:  Pastora Peterson MD        Use 3 times a day to affected area to the buttocks   Quantity:  60 g   Refills:  0       NEW MED   Used for:  Impetigo, Candidal intertrigo   Started by:  Pastora Peterson MD        Triple paste 40oz,  Use after brief changes groin and buttocks area   Quantity:  4 Container   Refills:  3         These medicines have changed or have updated prescriptions.        Dose/Directions    * NYSTATIN EX   This  may have changed:  Another medication with the same name was added. Make sure you understand how and when to take each.   Changed by:  Pastora Peterson MD        Externally apply topically as needed   Refills:  0       * nystatin ointment   Commonly known as:  MYCOSTATIN   This may have changed:  You were already taking a medication with the same name, and this prescription was added. Make sure you understand how and when to take each.   Used for:  Candidal intertrigo   Changed by:  Pastora Peterson MD        Apply twice daily to the scrotum and left groin   Quantity:  120 g   Refills:  1       * Notice:  This list has 2 medication(s) that are the same as other medications prescribed for you. Read the directions carefully, and ask your doctor or other care provider to review them with you.         Where to get your medicines      These medications were sent to [facility] [employer] - [place], [place] - [address] [facility] [address]     Phone:  [phone]     mupirocin 2 % ointment    nystatin ointment         Some of these will need a paper prescription and others can be bought over the counter.  Ask your nurse if you have questions.     Bring a paper prescription for each of these medications     NEW [facility]                Primary Care Provider Office Phone # Fax #    Maria Eugenia Sarah Dowell [phone]       [facility] PHYSIC[facility] [address]  [facility] [address]        Equal Access to Services     [facility] : Hadii dmitry anderson hadasho Soomaali, waaxda luqadaha, qaybta kaalmada adezurdoyada, serena canales . So [facility] [phone].    ATENCIÓN: Si habla español, tiene a wells disposición servicios gratuitos de asistencia lingüística. Llame al [phone].    We comply with applicable federal civil rights laws and [place] laws. We do not discriminate on the basis of race, color, national origin, age, disability, sex, sexual  orientation, or gender identity.            Thank you!     Thank you for choosing [facility]  for your care. Our goal is always to provide you with excellent care. Hearing back from our patients is one way we can continue to improve our services. Please take a few minutes to complete the written survey that you may receive in the mail after your visit with us. Thank you!             Your Updated Medication List - Protect others around you: Learn how to safely use, store and throw away your medicines at www.disposemymeds.org.          This list is accurate as of 7/10/18  4:48 PM.  Always use your most recent med list.                   Brand Name Dispense Instructions for use Diagnosis    ACETAMINOPHEN PO      325 mg by Oral or G tube route as needed for pain        albuterol (2.5 MG/3ML) 0.083% neb solution      as needed        alendronate 70 MG tablet    FOSAMAX    13 tablet    Take 1 tablet every 7 days via G tube. Take with water and without food or other medication for at least 30 minutes.    Osteoporosis       artificial tears Oint ophthalmic ointment      Place into both eyes 3 times daily        aspirin 81 MG tablet      Take 1 tablet by mouth daily. (*).        AYR SALINE NASAL GEL NA           benzoyl peroxide 5 % Liqd     140 g    Use as an anti-microbial wash in the tub every 2 days.    Irritant dermatitis       bisacodyl 10 MG Suppository    DULCOLAX     Place 10 mg rectally daily as needed for constipation        CALCIFEROL 8000 UNIT/ML drops   Generic drug:  ergocalciferol     30 mL    12 drops per g tube twice daily        calcium carbonate 1250 (500 Ca) MG/5ML Susp suspension     90 days    1 teaspoon down G tube daily    Cerebral palsy (H)       carbamide peroxide 6.5 % otic solution    DEBROX     as needed        CERTAVITE SENIOR/ANTIOXIDANT PO           cetirizine 5 MG Chew    zyrTEC     Take 5 mg by mouth daily        DIAZEPAM INTENSOL 5 MG/ML (HIGH CONC) solution   Generic  drug:  diazepam     30 mL    TAKE 1ML (5MG) BUCCALLY AS NEEDED FOR CONVULSIVE SEIZURE >1 MINUTE, 3 MINUTES OF A NON-CONVULSIVE SEIZURE OR 2ND NON-CONVULSIVE SEIZURE (WIT    Intractable generalized tonic-clonic epilepsy (H)       econazole nitrate 1 % cream     30 g    Apply 3x weekly to the groin. Okay to use twice daily with flares.    Intertrigo       guaiFENesin 400 MG Tabs      Per packet instructions as needed        * hydrocortisone valerate 0.2 % ointment    WEST-DONA    45 g    Apply to the area once a day.    Irritant dermatitis       * hydrocortisone 0.2 % cream    WESTCORT    60 g    Apply 3 x weekly to the groin area.    Intertrigo       LACTULOSE PO      Take by mouth daily        levETIRAcetam 100 MG/ML solution    KEPPRA    775 mL    GIVE 12.5ML (1250MG) PER G-TUBE TWICE DAILY    Seizure disorder (H)       levofloxacin 750 MG tablet    LEVAQUIN     Take by mouth daily 1 tab for 5 days - D/C 1.27.17        levothyroxine 50 MCG tablet    SYNTHROID/LEVOTHROID     1 TABLET DAILY        loratadine 10 MG tablet    CLARITIN     Take 10 mg by mouth daily        MIRALAX PO      Take 8.5 mg by mouth daily        mupirocin 2 % ointment    BACTROBAN    60 g    Use 3 times a day to affected area to the buttocks    Impetigo, Healing decubitus ulcer, unspecified ulcer stage       NEW MED     4 Container    Triple paste 40oz,  Use after brief changes groin and buttocks area    Impetigo, Candidal intertrigo       * NYSTATIN EX      Externally apply topically as needed        * nystatin ointment    MYCOSTATIN    120 g    Apply twice daily to the scrotum and left groin    Candidal intertrigo       order for DME     1 each    Equipment being ordered: Wheel Chair Cushion    Skin erosion       PATADAY 0.2 % Soln   Generic drug:  olopatadine HCl      None Entered        perampanel 6 MG tablet    FYCOMPA    31 tablet    TAKE 1 TABLET PER G-TUBE AT BEDTIME (SIGN NARC BOOK) **6 TOTAL FILLS*    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    Keratitis sicca, bilateral       PROCTOSOL HC 2.5 % cream   Generic drug:  hydrocortisone      as needed        REMEDY NUTRASHIELD 1 %   Generic drug:  dimethicone           REPLETE/FIBER Liqd      1 250cc CAN PER GT 4X/DAY WITH 325CC WATER 4X/DAY=5.5 cans total    Obesity       ROBINUL PO           valproic acid 250 MG/5ML Soln syrup    DEPAKENE    620 mL    TAKE 10ML (500MG) PER G-TUBE TWICE DAILY    Seizure disorder (H)       VITAMIN C PO      Take 500 mg by mouth daily        * Notice:  This list has 4 medication(s) that are the same as other medications prescribed for you. Read the directions carefully, and ask your doctor or other care provider to review them with you.

## 2018-07-10 NOTE — PROGRESS NOTES
[facility] Dermatology Note      Dermatology Problem List:  1. Intertrigo, groin  -Current Tx: Bactroban ointment (intiaited 6/13/2016), A&D ointment (initiated 10/16/2015)  -Previous Tx: Hibiclens (initiated 6/13/2016), clindamycin lotion (10/21/2015-6/13/2016), gentamicin ointment (initiated 10/21/2015), ketoconazole cream (10/16/2015-6/13/2016)  -s/p culture showing Klebsiella pneumoniae, Citrobacter freundii complex, Proteus mirabilis, Beta hemolytic Streptococcus group B and S. aureus 10/16/2015  2. Pressure Ulcer stage I-II: L posterior inguinal fold with freq superinfection and irritation from urine/stool.   -s/p swab 4/24/2017, MRSA and 2 other gram negative bacteria  -Current Tx: 5% BP wash, Neutroshield or AD ointment, Nystatin powder,      Encounter Date: Jul 10, 2018    CC:  Chief Complaint   Patient presents with     Skin Ulcer     left buttock pressure ulcer doing well today- Ketaconazole, hydrocortisone and mepalex bandages and AED ointment with brief changes BP wash with showers       History of Present Illness:  Mr. Marcus Cevallos is a 63 year old male who presents as a follow up for irritant dermatitis. The patient was last seen 1/4/2018 when the patient was seen by Dr. Bryson. The patient presents with a helper. The patient's helper reports that his ulcer opens and closes. He is using BPO wash. Rotating Q2 hours. THey feel it is stable.     The patient is present with an aid today.     Past Medical History:   Patient Active Problem List   Diagnosis     Cerebral palsy (H)     Mental retardation     Sleep apnea     Retinal detachment, left     Seizure disorder (H)     CVA (cerebral vascular accident) (H)     Hypothyroid     Osteopenia     Overweight (BMI 25.0-29.9)     Hypertension goal BP (blood pressure) < 130/80     Hyperlipidemia LDL goal <100     Blepharitis     Vitamin D deficiency disease     History of retinal detachment     Osteoporosis     Past Medical History:    Diagnosis Date     Cataract      Cerebral palsy (H)      CVA (cerebral vascular accident) (H)      HTN (hypertension)      Hypothyroid      Mental retardation      Obesity, unspecified      Retinal detachment, left      Seizure disorder (H)      Sleep apnea      No past surgical history on file.    Social History:  Reviewed and unchanged but kept in chart for clinician convenience  The patient is a vulnerable adult. He is in a care home.     Family History:   Not obtained at visit.     Medications:  Current Outpatient Prescriptions   Medication Sig Dispense Refill     alendronate (FOSAMAX) 70 MG tablet Take 1 tablet every 7 days via G tube. Take with water and without food or other medication for at least 30 minutes. 13 tablet 3     Aloe-Sodium Chloride (AYR SALINE NASAL GEL NA)        artificial tears OINT ophthalmic ointment Place into both eyes 3 times daily       Ascorbic Acid (VITAMIN C PO) Take 500 mg by mouth daily       aspirin 81 MG tablet Take 1 tablet by mouth daily. (*).   3     benzoyl peroxide 5 % LIQD Use as an anti-microbial wash in the tub every 2 days. 140 g 11     bisacodyl (DULCOLAX) 10 MG suppository Place 10 mg rectally daily as needed for constipation       CALCIUM CARBONATE 1250 MG/5ML OR SUSP 1 teaspoon down G tube daily 90 days 3     CARBAMIDE PEROXIDE 6.5 % OT SOLN as needed       cetirizine (ZYRTEC) 5 MG CHEW Take 5 mg by mouth daily       dimethicone (REMEDY NUTRASHIELD) 1 %        ergocalciferol (CALCIFEROL) 8000 UNIT/ML drops 12 drops per g tube twice daily 30 mL      Glycopyrrolate (ROBINUL PO)        hydrocortisone (WESTCORT) 0.2 % cream Apply 3 x weekly to the groin area. 60 g 4     hydrocortisone valerate (WEST-DONA) 0.2 % ointment Apply to the area once a day. 45 g 0     LACTULOSE PO Take by mouth daily       levETIRAcetam (KEPPRA) 100 MG/ML solution GIVE 12.5ML (1250MG) PER G-TUBE TWICE DAILY 775 mL 11     LEVOTHYROXINE SODIUM 50 MCG OR TABS 1 TABLET DAILY       loratadine  (CLARITIN) 10 MG tablet Take 10 mg by mouth daily       Multiple Vitamins-Minerals (CERTAVITE SENIOR/ANTIOXIDANT PO)        order for DME Equipment being ordered: Wheel Chair Cushion 1 each 1     PATADAY 0.2 % OP SOLN None Entered       perampanel (FYCOMPA) 6 MG tablet TAKE 1 TABLET PER G-TUBE AT BEDTIME (SIGN NARC BOOK) **6 TOTAL FILLS* 31 tablet 5     Polyethylene Glycol 3350 (MIRALAX PO) Take 8.5 mg by mouth daily        polyvinyl alcohol (ARTIFICIAL TEARS) 1.4 % ophthalmic solution Instill one drop into both eyes 3 times a day. 6 mL 12     valproic acid (DEPAKENE) 250 MG/5ML SOLN syrup TAKE 10ML (500MG) PER G-TUBE TWICE DAILY 620 mL 3     ACETAMINOPHEN  mg by Oral or G tube route as needed for pain       ALBUTEROL SULFATE (2.5 MG/3ML) 0.083% IN NEBU as needed       DIAZEPAM INTENSOL 5 MG/ML (HIGH CONC) solution TAKE 1ML (5MG) BUCCALLY AS NEEDED FOR CONVULSIVE SEIZURE >1 MINUTE, 3 MINUTES OF A NON-CONVULSIVE SEIZURE OR 2ND NON-CONVULSIVE SEIZURE (WIT (Patient not taking: Reported on 7/10/2018) 30 mL 1     econazole nitrate 1 % cream Apply 3x weekly to the groin. Okay to use twice daily with flares. 30 g 11     guaiFENesin 400 MG TABS Per packet instructions as needed       levofloxacin (LEVAQUIN) 750 MG tablet Take by mouth daily 1 tab for 5 days - D/C 1.27.17       NYSTATIN EX Externally apply topically as needed        PROCTOSOL HC 2.5 % RE CREA as needed       REPLETE/FIBER OR LIQD 1 250cc CAN PER GT 4X/DAY WITH 325CC WATER 4X/DAY=5.5 cans total  0     Allergies   Allergen Reactions     No Known Allergies      Review of Systems:  -Skin: As above in HPI. No additional skin concerns.  -no recent illness per pca  Physical exam:  There were no vitals taken for this visit.  GEN: This is a well developed, well-nourished male in no acute distress, in a pleasant mood.    SKIN: Focused examination of the buttocks was performed.  -Yellow crusting present on the sacrum with shallow skin limited, ulceration, no bone  or fat  -Erythematous patches on the scrotum and left groin with few white papules  -No other lesions of concern on areas examined.     Impression/Plan:  1. Pressure ulcer, lower back with overlying impetiginization.      Start Bactroban TID to yellow areas    Continue 5% BPO wash during every tub bath to prevent infections.     Recommend more frequent rotation    2. Intertrigo candida- left  Groin and scrotum and buttock    Continue Mepilex padding to the area only if needed for bowels    Start triple paste with changes    Start Nystatin to the left groin    Hold ketoconazole  Will add topical steroid at follow up if able  Follow-up in 1 months, earlier for new or changing lesions.     Staff Involved:  Scribe/Staff    Scribe Disclosure:   I, Ashly Gramajo, am serving as a scribe to document services personally performed by Dr. Pastora Peterson, based on data collection and the provider's statements to me.     Provider Disclosure:   The documentation recorded by the scribe accurately reflects the services I personally performed and the decisions made by me.    Pastora Peterson MD    Department of Dermatology  [facility]: Phone: [phone], Fax:[phone]  [facility]: Phone: [phone], Fax: [phone]

## 2018-07-31 DIAGNOSIS — L01.00 IMPETIGO: ICD-10-CM

## 2018-07-31 DIAGNOSIS — L89.90: ICD-10-CM

## 2018-08-01 RX ORDER — MUPIROCIN 20 MG/G
OINTMENT TOPICAL
Qty: 66 G | Refills: 11 | Status: SHIPPED | OUTPATIENT
Start: 2018-08-01 | End: 2019-05-24

## 2018-08-01 NOTE — TELEPHONE ENCOUNTER
Mupirocin       Last Written Prescription Date:  7/10/18  Last Fill Quantity: 60g,   # refills: 0  Last Office Visit: 7/10/18   Future Office visit:    Next 5 appointments (look out 90 days)     Aug 10, 2018  3:45 PM CDT   [facility] with Past[facility] MD   [facility]    [address] [place] [id number] [phone]            Oct 19, 2018  3:30 PM CDT   Return Visit with Sona Kapoor MD   [facility]    [address] [facility] [id number] [phone]                   Routing refill request to provider for review/approval because:  Drug not on the [facility], [facility] or [facility] refill protocol or controlled substance. Routing to  to review and advise...Hali Flores RN      Impression/Plan:  1. Pressure ulcer, lower back with overlying impetiginization.      Start Bactroban TID to yellow areas    Continue 5% BPO wash during every tub bath to prevent infections.     Recommend more frequent rotation     2. Intertrigo candida- left  Groin and scrotum and buttock    Continue Mepilex padding to the area only if needed for bowels    Start triple paste with changes    Start Nystatin to the left groin    Hold ketoconazole  Will add topical steroid at follow up if able  Follow-up in 1 months, earlier for new or changing lesions.      Staff Involved:  Scribe/Staff     Scribe Disclosure:   I, Ashly Gramajo, am serving as a scribe to document services personally performed by Dr. Pastora Peterson, based on data collection and the provider's statements to me.      Provider Disclosure:   The documentation recorded by the scribe accurately reflects the services I personally performed and the decisions made by me.     Pastora Peterson MD    Department of Dermatology  [facility]: Phone: [phone], Fax:[phone]  [facility]  [facility]: Phone: [phone], Fax: [phone]

## 2018-08-10 ENCOUNTER — OFFICE VISIT (OUTPATIENT)
Dept: DERMATOLOGY | Facility: CLINIC | Age: 63
End: 2018-08-10
Payer: MEDICARE

## 2018-08-10 DIAGNOSIS — L24.9 IRRITANT DERMATITIS: Primary | ICD-10-CM

## 2018-08-10 PROCEDURE — 99213 OFFICE O/P EST LOW 20 MIN: CPT | Performed by: DERMATOLOGY

## 2018-08-10 RX ORDER — HYDROCORTISONE VALERATE CREAM 2 MG/G
CREAM TOPICAL
Qty: 180 G | Refills: 0 | Status: SHIPPED | OUTPATIENT
Start: 2018-08-10 | End: 2018-08-14

## 2018-08-10 NOTE — MR AVS SNAPSHOT
After Visit Summary   8/10/2018    Marcus Cevallos    MRN: [id number]           Patient Information     Date Of Birth          1955        Visit Information        Provider Department      8/10/2018 3:45 PM Pastora Peterson MD [facility]        Today's Diagnoses     Irritant dermatitis    -  1       Follow-ups after your visit        Follow-up notes from your care team     Return in about 4 weeks (around 9/7/2018).      Your next 10 appointments already scheduled     Sep 21, 2018  3:45 PM CDT   Return Visit with Pastora Peterson MD   [facility] ([facility])    [address] [place] [address]   [phone]            Oct 19, 2018  3:30 PM CDT   Return Visit with Sona Kapoor MD   [facility] ([facility])    [address] [place] [address]   [phone]            Nov 05, 2018  2:00 PM CST   New Visit with Anthony Summers OD   [facility]    [address] [facility] [id number] [phone]              Who to contact     If you have questions or need follow up information about today's clinic visit or your schedule please contact [facility] directly at [phone].  Normal or non-critical lab and imaging results will be communicated to you by MyChart, letter or phone within 4 business days after the clinic has received the results. If you do not hear from us within 7 days, please contact the clinic through [employer] or phone. If you have a critical or abnormal lab result, we will notify you by phone as soon as possible.  Submit refill requests through [employer] or call your pharmacy and they will forward the refill request to us. Please allow 3 business days for your refill to be completed.          Additional Information About Your Visit        [employer] Information     [employer] is an electronic gateway  that provides easy, online access to your medical records. With [employer], you can request a clinic appointment, read your test results, renew a prescription or communicate with your care team.     To sign up for [employer] visit the website at [email or web address]   You will be asked to enter the access code listed below, as well as some personal information. Please follow the directions to create your username and password.     Your access code is: [email or web address]  Expires: 10/8/2018  4:42 PM     Your access code will  in 90 days. If you need help or a new code, please contact your [facility] or call [phone] for assistance.        Care EveryWhere ID     This is your Care EveryWhere ID. This could be used by other organizations to access your [place] medical records  [id number]         Blood Pressure from Last 3 Encounters:   18 112/70   17 109/43   17 137/72    Weight from Last 3 Encounters:   17 68 kg (149 lb 14.4 oz)   17 67.8 kg (149 lb 8 oz)   16 67.9 kg (149 lb 9.6 oz)              Today, you had the following     No orders found for display         Today's Medication Changes          These changes are accurate as of 8/10/18  4:18 PM.  If you have any questions, ask your nurse or doctor.               These medicines have changed or have updated prescriptions.        Dose/Directions    * hydrocortisone valerate 0.2 % ointment   Commonly known as:  WEST-DONA   This may have changed:  Another medication with the same name was added. Make sure you understand how and when to take each.   Used for:  Irritant dermatitis        Apply to the area once a day.   Quantity:  45 g   Refills:  0       * hydrocortisone 0.2 % cream   Commonly known as:  WESTCORT   This may have changed:  Another medication with the same name was added. Make sure you understand how and when to take each.   Used for:  Intertrigo        Apply 3 x weekly to the groin  area.   Quantity:  60 g   Refills:  4       * hydrocortisone 0.2 % cream   Commonly known as:  WESTCORT   This may have changed:  You were already taking a medication with the same name, and this prescription was added. Make sure you understand how and when to take each.   Used for:  Irritant dermatitis        Start twice daily for 4 weeks to red areas   Quantity:  180 g   Refills:  0       * Notice:  This list has 3 medication(s) that are the same as other medications prescribed for you. Read the directions carefully, and ask your doctor or other care provider to review them with you.         Where to get your medicines      These medications were sent to [facility] - [place], [place] - [address]  [address] [facility] [address]     Phone:  [phone]     hydrocortisone 0.2 % cream                Primary Care Provider Office Phone # Fax #    Maria Eugenia Sarah Dowell [phone]       [facility] [address]  [facility] [address]        Equal Access to Services     SHIREEN MCCALL : Hadii aad ku hadasho Soomaali, waaxda luqadaha, qaybta kaalmada adeegyada, waxay idiin haybrunan vernell khavtar canales . So [facility] [phone].    ATENCIÓN: Si habla español, tiene a wells disposición servicios gratuitos de asistencia lingüística. [facility] [phone].    We comply with applicable federal civil rights laws and [place] laws. We do not discriminate on the basis of race, color, national origin, age, disability, sex, sexual orientation, or gender identity.            Thank you!     Thank you for choosing [facility]  for your care. Our goal is always to provide you with excellent care. Hearing back from our patients is one way we can continue to improve our services. Please take a few minutes to complete the written survey that you may receive in the mail after your visit with us. Thank you!             Your Updated Medication List -  Protect others around you: Learn how to safely use, store and throw away your medicines at www.disposemymeds.org.          This list is accurate as of 8/10/18  4:18 PM.  Always use your most recent med list.                   Brand Name Dispense Instructions for use Diagnosis    ACETAMINOPHEN PO      325 mg by Oral or G tube route as needed for pain        albuterol (2.5 MG/3ML) 0.083% neb solution      as needed        alendronate 70 MG tablet    FOSAMAX    13 tablet    Take 1 tablet every 7 days via G tube. Take with water and without food or other medication for at least 30 minutes.    Osteoporosis       artificial tears Oint ophthalmic ointment      Place into both eyes 3 times daily        aspirin 81 MG tablet      Take 1 tablet by mouth daily. (*).        AYR SALINE NASAL GEL NA           benzoyl peroxide 5 % Liqd     140 g    Use as an anti-microbial wash in the tub every 2 days.    Irritant dermatitis       bisacodyl 10 MG Suppository    DULCOLAX     Place 10 mg rectally daily as needed for constipation        CALCIFEROL 8000 UNIT/ML drops   Generic drug:  ergocalciferol     30 mL    12 drops per g tube twice daily        calcium carbonate 1250 (500 Ca) MG/5ML Susp suspension     90 days    1 teaspoon down G tube daily    Cerebral palsy (H)       carbamide peroxide 6.5 % otic solution    DEBROX     as needed        CERTAVITE SENIOR/ANTIOXIDANT PO           cetirizine 5 MG Chew    zyrTEC     Take 5 mg by mouth daily        DIAZEPAM INTENSOL 5 MG/ML (HIGH CONC) solution   Generic drug:  diazepam     30 mL    TAKE 1ML (5MG) BUCCALLY AS NEEDED FOR CONVULSIVE SEIZURE >1 MINUTE, 3 MINUTES OF A NON-CONVULSIVE SEIZURE OR 2ND NON-CONVULSIVE SEIZURE (WIT    Intractable generalized tonic-clonic epilepsy (H)       econazole nitrate 1 % cream     30 g    Apply 3x weekly to the groin. Okay to use twice daily with flares.    Intertrigo       guaiFENesin 400 MG Tabs      Per packet instructions as needed        *  hydrocortisone valerate 0.2 % ointment    WEST-DONA    45 g    Apply to the area once a day.    Irritant dermatitis       * hydrocortisone 0.2 % cream    WESTCORT    60 g    Apply 3 x weekly to the groin area.    Intertrigo       * hydrocortisone 0.2 % cream    WESTCORT    180 g    Start twice daily for 4 weeks to red areas    Irritant dermatitis       LACTULOSE PO      Take by mouth daily        levETIRAcetam 100 MG/ML solution    KEPPRA    775 mL    GIVE 12.5ML (1250MG) PER G-TUBE TWICE DAILY    Seizure disorder (H)       levofloxacin 750 MG tablet    LEVAQUIN     Take by mouth daily 1 tab for 5 days - D/C 1.27.17        levothyroxine 50 MCG tablet    SYNTHROID/LEVOTHROID     1 TABLET DAILY        loratadine 10 MG tablet    CLARITIN     Take 10 mg by mouth daily        MIRALAX PO      Take 8.5 mg by mouth daily        mupirocin 2 % ointment    BACTROBAN    66 g    APPLY TO AFFECTED AREA(S) OF BUTTOCKS THREE TIMES DAILY *1 TOTAL FILL*    Impetigo, Healing decubitus ulcer, unspecified ulcer stage       NEW MED     4 Container    Triple paste 40oz,  Use after brief changes groin and buttocks area    Impetigo, Candidal intertrigo       * NYSTATIN EX      Externally apply topically as needed        * nystatin ointment    MYCOSTATIN    120 g    Apply twice daily to the scrotum and left groin    Candidal intertrigo       order for DME     1 each    Equipment being ordered: Wheel Chair Cushion    Skin erosion       PATADAY 0.2 % Soln   Generic drug:  olopatadine HCl      None Entered        perampanel 6 MG tablet    FYCOMPA    31 tablet    TAKE 1 TABLET PER G-TUBE AT BEDTIME (SIGN NARC BOOK) **6 TOTAL FILLS*    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    Keratitis sicca, bilateral       PROCTOSOL HC 2.5 % cream   Generic drug:  hydrocortisone      as needed        REMEDY  NUTRASHIELD 1 %   Generic drug:  dimethicone           REPLETE/FIBER Liqd      1 250cc CAN PER GT 4X/DAY WITH 325CC WATER 4X/DAY=5.5 cans total    Obesity       ROBINUL PO           valproic acid 250 MG/5ML Soln syrup    DEPAKENE    620 mL    TAKE 10ML (500MG) PER G-TUBE TWICE DAILY    Seizure disorder (H)       VITAMIN C PO      Take 500 mg by mouth daily        * Notice:  This list has 5 medication(s) that are the same as other medications prescribed for you. Read the directions carefully, and ask your doctor or other care provider to review them with you.

## 2018-08-10 NOTE — LETTER
8/10/2018         RE: Marcus Cevallos  [address] [address]        Dear Colleague,    Thank you for referring your patient, Marcus Cevallos, to the [facility]. Please see a copy of my visit note below.    [facility] D[facility]atology Note      Dermatology Problem List:  1. Intertrigo, groin  -Current Tx: Bactroban ointment (intiaited 6/13/2016), A&D ointment (initiated 10/16/2015)  -Previous Tx: Hibiclens (initiated 6/13/2016), clindamycin lotion (10/21/2015-6/13/2016), gentamicin ointment (initiated 10/21/2015), ketoconazole cream (10/16/2015-6/13/2016)  -s/p culture showing Klebsiella pneumoniae, Citrobacter freundii complex, Proteus mirabilis, Beta hemolytic Streptococcus group B and S. aureus 10/16/2015  2. Pressure Ulcer stage I-II: L posterior inguinal fold with freq superinfection and irritation from urine/stool.   -s/p swab 4/24/2017, MRSA and 2 other gram negative bacteria  -Current Tx: 5% BP wash, Neutroshield or AD ointment, Nystatin powder,      Encounter Date: Aug 10, 2018    CC:  Chief Complaint   Patient presents with     RECHECK     Recheck sore on buttock       History of Present Illness:  Mr. Marcus Cevallos is a 63 year old male who presents as a follow up for irritant dermatitis. The patient was last seen and buttocks ulcer was not improving and groin rash creams where changed. Care take feels that buttocks is improved but not much change in other areas     The patient is present with an aid today.     Past Medical History:   Patient Active Problem List   Diagnosis     Cerebral palsy (H)     Mental retardation     Sleep apnea     Retinal detachment, left     Seizure disorder (H)     CVA (cerebral vascular accident) (H)     Hypothyroid     Osteopenia     Overweight (BMI 25.0-29.9)     Hypertension goal BP (blood pressure) < 130/80     Hyperlipidemia LDL goal <100     Blepharitis     Vitamin D deficiency disease     History  of retinal detachment     Osteoporosis     Past Medical History:   Diagnosis Date     Cataract      Cerebral palsy (H)      CVA (cerebral vascular accident) (H)      HTN (hypertension)      Hypothyroid      Mental retardation      Obesity, unspecified      Retinal detachment, left      Seizure disorder (H)      Sleep apnea      No past surgical history on file.    Social History:  Social History     Social History     Marital status: Single     Spouse name: N/A     Number of children: N/A     Years of education: N/A     Occupational History     Not on file.     Social History Main Topics     Smoking status: Never Smoker     Smokeless tobacco: Never Used     Alcohol use No     Drug use: No     Sexual activity: Not Currently     Other Topics Concern     Parent/Sibling W/ Cabg, Mi Or Angioplasty Before 65f 55m? No     Social History Narrative     Reviewed and unchanged but kept in chart for clinician convenience   He is in a care home.     Family History:   Family History   Problem Relation Age of Onset     Unknown/Adopted Mother      Hypertension Father      Not obtained at visit.     Medications:  Current Outpatient Prescriptions   Medication Sig Dispense Refill     ACETAMINOPHEN  mg by Oral or G tube route as needed for pain       ALBUTEROL SULFATE (2.5 MG/3ML) 0.083% IN NEBU as needed       alendronate (FOSAMAX) 70 MG tablet Take 1 tablet every 7 days via G tube. Take with water and without food or other medication for at least 30 minutes. 13 tablet 3     Aloe-Sodium Chloride (AYR SALINE NASAL GEL NA)        artificial tears OINT ophthalmic ointment Place into both eyes 3 times daily       Ascorbic Acid (VITAMIN C PO) Take 500 mg by mouth daily       aspirin 81 MG tablet Take 1 tablet by mouth daily. (*).   3     benzoyl peroxide 5 % LIQD Use as an anti-microbial wash in the tub every 2 days. 140 g 11     bisacodyl (DULCOLAX) 10 MG suppository Place 10 mg rectally daily as needed for constipation        CALCIUM CARBONATE 1250 MG/5ML OR SUSP 1 teaspoon down G tube daily 90 days 3     CARBAMIDE PEROXIDE 6.5 % OT SOLN as needed       cetirizine (ZYRTEC) 5 MG CHEW Take 5 mg by mouth daily       DIAZEPAM INTENSOL 5 MG/ML (HIGH CONC) solution TAKE 1ML (5MG) BUCCALLY AS NEEDED FOR CONVULSIVE SEIZURE >1 MINUTE, 3 MINUTES OF A NON-CONVULSIVE SEIZURE OR 2ND NON-CONVULSIVE SEIZURE (WIT 30 mL 1     dimethicone (REMEDY NUTRASHIELD) 1 %        econazole nitrate 1 % cream Apply 3x weekly to the groin. Okay to use twice daily with flares. 30 g 11     ergocalciferol (CALCIFEROL) 8000 UNIT/ML drops 12 drops per g tube twice daily 30 mL      Glycopyrrolate (ROBINUL PO)        guaiFENesin 400 MG TABS Per packet instructions as needed       hydrocortisone (WESTCORT) 0.2 % cream Apply 3 x weekly to the groin area. 60 g 4     hydrocortisone valerate (WEST-DONA) 0.2 % ointment Apply to the area once a day. 45 g 0     LACTULOSE PO Take by mouth daily       levETIRAcetam (KEPPRA) 100 MG/ML solution GIVE 12.5ML (1250MG) PER G-TUBE TWICE DAILY 775 mL 11     levofloxacin (LEVAQUIN) 750 MG tablet Take by mouth daily 1 tab for 5 days - D/C 1.27.17       LEVOTHYROXINE SODIUM 50 MCG OR TABS 1 TABLET DAILY       loratadine (CLARITIN) 10 MG tablet Take 10 mg by mouth daily       Multiple Vitamins-Minerals (CERTAVITE SENIOR/ANTIOXIDANT PO)        mupirocin (BACTROBAN) 2 % ointment APPLY TO AFFECTED AREA(S) OF BUTTOCKS THREE TIMES DAILY *1 TOTAL FILL* 66 g 11     NEW MED Triple paste 40oz,  Use after brief changes groin and buttocks area 4 Container 3     nystatin (MYCOSTATIN) ointment Apply twice daily to the scrotum and left groin 120 g 1     NYSTATIN EX Externally apply topically as needed        order for DME Equipment being ordered: Wheel Chair Cushion 1 each 1     PATADAY 0.2 % OP SOLN None Entered       perampanel (FYCOMPA) 6 MG tablet TAKE 1 TABLET PER G-TUBE AT BEDTIME (SIGN NARC BOOK) **6 TOTAL FILLS* 31 tablet 5     Polyethylene Glycol 3350  (MIRALAX PO) Take 8.5 mg by mouth daily        polyvinyl alcohol (ARTIFICIAL TEARS) 1.4 % ophthalmic solution Instill one drop into both eyes 3 times a day. 6 mL 12     PROCTOSOL HC 2.5 % RE CREA as needed       REPLETE/FIBER OR LIQD 1 250cc CAN PER GT 4X/DAY WITH 325CC WATER 4X/DAY=5.5 cans total  0     valproic acid (DEPAKENE) 250 MG/5ML SOLN syrup TAKE 10ML (500MG) PER G-TUBE TWICE DAILY 620 mL 3     Allergies   Allergen Reactions     No Known Allergies      Review of Systems:  -Skin: As above in HPI. No additional skin concerns.  -no recent hospitalizations, recent testing for UTI  Physical exam:  There were no vitals taken for this visit.  GEN: This is a well developed, well-nourished male in no acute distress, in a pleasant mood.    SKIN: Focused examination of the buttocks was performed.  -Well healed scar on left buttocks (lateral) with plaque like erythema  -Erythematous patches on the scrotum, penis and mons pubis with plaques  -No other lesions of concern on areas examined.     Impression/Plan:  1. Pressure ulcer-resolved. Now with likely irritant dermatitis in diaper area. (chronic irritation from urine)      Hold bactroban/nystatin    Continue 5% BPO wash during every tub bath to prevent infections.     Start westcort cream twice daily for 4 weeks.     Continue Desitin      Follow-up for total skin exam 1 month earlier for new or changing lesions.     Staff Involved:  Scribe/Staff    Scribe Disclosure  I, Tino Garcia, am serving as a scribe to document services personally performed by Dr. Pastora Peterson MD, based on data collection and the provider's statements to me.     Provider Disclosure:   The documentation recorded by the scribe accurately reflects the services I personally performed and the decisions made by me.    Pastora Peterson MD    Department of Dermatology  [facility]: Phone: [phone], Fax:[phone]  [place]  [facility]: Phone: [phone], Fax: [phone]        Again, thank you for allowing me to participate in the care of your patient.        Sincerely,        Pastora Peterson MD

## 2018-08-10 NOTE — PROGRESS NOTES
[facility] Dermatology Note      Dermatology Problem List:  1. Intertrigo, groin  -Current Tx: Bactroban ointment (intiaited 6/13/2016), A&D ointment (initiated 10/16/2015)  -Previous Tx: Hibiclens (initiated 6/13/2016), clindamycin lotion (10/21/2015-6/13/2016), gentamicin ointment (initiated 10/21/2015), ketoconazole cream (10/16/2015-6/13/2016)  -s/p culture showing Klebsiella pneumoniae, Citrobacter freundii complex, Proteus mirabilis, Beta hemolytic Streptococcus group B and S. aureus 10/16/2015  2. Pressure Ulcer stage I-II: L posterior inguinal fold with freq superinfection and irritation from urine/stool.   -s/p swab 4/24/2017, MRSA and 2 other gram negative bacteria  -Current Tx: 5% BP wash, Neutroshield or AD ointment, Nystatin powder,      Encounter Date: Aug 10, 2018    CC:  Chief Complaint   Patient presents with     RECHECK     Recheck sore on buttock       History of Present Illness:  Mr. Marcus Cevallos is a 63 year old male who presents as a follow up for irritant dermatitis. The patient was last seen and buttocks ulcer was not improving and groin rash creams where changed. Care take feels that buttocks is improved but not much change in other areas     The patient is present with an aid today.     Past Medical History:   Patient Active Problem List   Diagnosis     Cerebral palsy (H)     Mental retardation     Sleep apnea     Retinal detachment, left     Seizure disorder (H)     CVA (cerebral vascular accident) (H)     Hypothyroid     Osteopenia     Overweight (BMI 25.0-29.9)     Hypertension goal BP (blood pressure) < 130/80     Hyperlipidemia LDL goal <100     Blepharitis     Vitamin D deficiency disease     History of retinal detachment     Osteoporosis     Past Medical History:   Diagnosis Date     Cataract      Cerebral palsy (H)      CVA (cerebral vascular accident) (H)      HTN (hypertension)      Hypothyroid      Mental retardation      Obesity, unspecified       Retinal detachment, left      Seizure disorder (H)      Sleep apnea      No past surgical history on file.    Social History:  Social History     Social History     Marital status: Single     Spouse name: N/A     Number of children: N/A     Years of education: N/A     Occupational History     Not on file.     Social History Main Topics     Smoking status: Never Smoker     Smokeless tobacco: Never Used     Alcohol use No     Drug use: No     Sexual activity: Not Currently     Other Topics Concern     Parent/Sibling W/ Cabg, Mi Or Angioplasty Before 65f 55m? No     Social History Narrative     Reviewed and unchanged but kept in chart for clinician convenience   He is in a care home.     Family History:   Family History   Problem Relation Age of Onset     Unknown/Adopted Mother      Hypertension Father      Not obtained at visit.     Medications:  Current Outpatient Prescriptions   Medication Sig Dispense Refill     ACETAMINOPHEN  mg by Oral or G tube route as needed for pain       ALBUTEROL SULFATE (2.5 MG/3ML) 0.083% IN NEBU as needed       alendronate (FOSAMAX) 70 MG tablet Take 1 tablet every 7 days via G tube. Take with water and without food or other medication for at least 30 minutes. 13 tablet 3     Aloe-Sodium Chloride (AYR SALINE NASAL GEL NA)        artificial tears OINT ophthalmic ointment Place into both eyes 3 times daily       Ascorbic Acid (VITAMIN C PO) Take 500 mg by mouth daily       aspirin 81 MG tablet Take 1 tablet by mouth daily. (*).   3     benzoyl peroxide 5 % LIQD Use as an anti-microbial wash in the tub every 2 days. 140 g 11     bisacodyl (DULCOLAX) 10 MG suppository Place 10 mg rectally daily as needed for constipation       CALCIUM CARBONATE 1250 MG/5ML OR SUSP 1 teaspoon down G tube daily 90 days 3     CARBAMIDE PEROXIDE 6.5 % OT SOLN as needed       cetirizine (ZYRTEC) 5 MG CHEW Take 5 mg by mouth daily       DIAZEPAM INTENSOL 5 MG/ML (HIGH CONC) solution TAKE 1ML (5MG) BUCCALLY  AS NEEDED FOR CONVULSIVE SEIZURE >1 MINUTE, 3 MINUTES OF A NON-CONVULSIVE SEIZURE OR 2ND NON-CONVULSIVE SEIZURE (WIT 30 mL 1     dimethicone (REMEDY NUTRASHIELD) 1 %        econazole nitrate 1 % cream Apply 3x weekly to the groin. Okay to use twice daily with flares. 30 g 11     ergocalciferol (CALCIFEROL) 8000 UNIT/ML drops 12 drops per g tube twice daily 30 mL      Glycopyrrolate (ROBINUL PO)        guaiFENesin 400 MG TABS Per packet instructions as needed       hydrocortisone (WESTCORT) 0.2 % cream Apply 3 x weekly to the groin area. 60 g 4     hydrocortisone valerate (WEST-DONA) 0.2 % ointment Apply to the area once a day. 45 g 0     LACTULOSE PO Take by mouth daily       levETIRAcetam (KEPPRA) 100 MG/ML solution GIVE 12.5ML (1250MG) PER G-TUBE TWICE DAILY 775 mL 11     levofloxacin (LEVAQUIN) 750 MG tablet Take by mouth daily 1 tab for 5 days - D/C 1.27.17       LEVOTHYROXINE SODIUM 50 MCG OR TABS 1 TABLET DAILY       loratadine (CLARITIN) 10 MG tablet Take 10 mg by mouth daily       Multiple Vitamins-Minerals (CERTAVITE SENIOR/ANTIOXIDANT PO)        mupirocin (BACTROBAN) 2 % ointment APPLY TO AFFECTED AREA(S) OF BUTTOCKS THREE TIMES DAILY *1 TOTAL FILL* 66 g 11     NEW MED Triple paste 40oz,  Use after brief changes groin and buttocks area 4 Container 3     nystatin (MYCOSTATIN) ointment Apply twice daily to the scrotum and left groin 120 g 1     NYSTATIN EX Externally apply topically as needed        order for DME Equipment being ordered: Wheel Chair Cushion 1 each 1     PATADAY 0.2 % OP SOLN None Entered       perampanel (FYCOMPA) 6 MG tablet TAKE 1 TABLET PER G-TUBE AT BEDTIME (SIGN NARC BOOK) **6 TOTAL FILLS* 31 tablet 5     Polyethylene Glycol 3350 (MIRALAX PO) Take 8.5 mg by mouth daily        polyvinyl alcohol (ARTIFICIAL TEARS) 1.4 % ophthalmic solution Instill one drop into both eyes 3 times a day. 6 mL 12     PROCTOSOL HC 2.5 % RE CREA as needed       REPLETE/FIBER OR LIQD 1 250cc CAN PER GT 4X/DAY  WITH 325CC WATER 4X/DAY=5.5 cans total  0     valproic acid (DEPAKENE) 250 MG/5ML SOLN syrup TAKE 10ML (500MG) PER G-TUBE TWICE DAILY 620 mL 3     Allergies   Allergen Reactions     No Known Allergies      Review of Systems:  -Skin: As above in HPI. No additional skin concerns.  -no recent hospitalizations, recent testing for UTI  Physical exam:  There were no vitals taken for this visit.  GEN: This is a well developed, well-nourished male in no acute distress, in a pleasant mood.    SKIN: Focused examination of the buttocks was performed.  -Well healed scar on left buttocks (lateral) with plaque like erythema  -Erythematous patches on the scrotum, penis and mons pubis with plaques  -No other lesions of concern on areas examined.     Impression/Plan:  1. Pressure ulcer-resolved. Now with likely irritant dermatitis in diaper area. (chronic irritation from urine)      Hold bactroban/nystatin    Continue 5% BPO wash during every tub bath to prevent infections.     Start westcort cream twice daily for 4 weeks.     Continue Desitin      Follow-up for total skin exam 1 month earlier for new or changing lesions.     Staff Involved:  Scribe/Staff    Scribe Disclosure  I, Tino Garcia, am serving as a scribe to document services personally performed by Dr. Pastora Peterson MD, based on data collection and the provider's statements to me.     Provider Disclosure:   The documentation recorded by the scribe accurately reflects the services I personally performed and the decisions made by me.    Pastora Peterson MD    Department of Dermatology  [facility]: Phone: [phone], Fax:[phone]  [facility]: Phone: [phone], Fax: [phone]

## 2018-08-14 ENCOUNTER — TELEPHONE (OUTPATIENT)
Dept: DERMATOLOGY | Facility: CLINIC | Age: 63
End: 2018-08-14

## 2018-08-14 DIAGNOSIS — G40.909 SEIZURE DISORDER (H): ICD-10-CM

## 2018-08-14 DIAGNOSIS — L24.9 IRRITANT DERMATITIS: Primary | ICD-10-CM

## 2018-08-14 RX ORDER — HYDROCORTISONE 25 MG/G
OINTMENT TOPICAL
Qty: 454 G | Refills: 0 | Status: SHIPPED | OUTPATIENT
Start: 2018-08-14

## 2018-08-14 NOTE — TELEPHONE ENCOUNTER
Hydrocortisone Lucy 0.2% not covered.  Alternatives that are covered:  Hydrocortisone 1% cream or triamcinolone 0.1% cream.    Forwarding to Dr. Peterson to review and advise.    Nat Myers RN

## 2018-09-21 ENCOUNTER — OFFICE VISIT (OUTPATIENT)
Dept: DERMATOLOGY | Facility: CLINIC | Age: 63
End: 2018-09-21
Payer: MEDICARE

## 2018-09-21 DIAGNOSIS — L08.0 PYODERMA: ICD-10-CM

## 2018-09-21 DIAGNOSIS — R21 RASH: Primary | ICD-10-CM

## 2018-09-21 PROCEDURE — 87070 CULTURE OTHR SPECIMN AEROBIC: CPT | Performed by: DERMATOLOGY

## 2018-09-21 PROCEDURE — 87077 CULTURE AEROBIC IDENTIFY: CPT | Performed by: DERMATOLOGY

## 2018-09-21 PROCEDURE — 99213 OFFICE O/P EST LOW 20 MIN: CPT | Performed by: DERMATOLOGY

## 2018-09-21 PROCEDURE — 87186 SC STD MICRODIL/AGAR DIL: CPT | Performed by: DERMATOLOGY

## 2018-09-21 NOTE — PROGRESS NOTES
[facility] Dermatology Note      Dermatology Problem List:  1. Intertrigo, groin  -Current Tx: Bactroban ointment (intiaited 6/13/2016), A&D ointment (initiated 10/16/2015)  -Previous Tx: Hibiclens (initiated 6/13/2016), clindamycin lotion (10/21/2015-6/13/2016), gentamicin ointment (initiated 10/21/2015), ketoconazole cream (10/16/2015-6/13/2016)  -s/p culture showing Klebsiella pneumoniae, Citrobacter freundii complex, Proteus mirabilis, Beta hemolytic Streptococcus group B and S. aureus 10/16/2015  2. Pressure Ulcer stage I-II: L posterior inguinal fold with freq superinfection and irritation from urine/stool.   -s/p swab 4/24/2017, MRSA and 2 other gram negative bacteria  -Current Tx: 5% BP wash, Neutroshield or AD ointment, Nystatin powder,      Encounter Date: Sep 21, 2018    CC:  Chief Complaint   Patient presents with     Skin Check     Recheck of buttock, Care giver states quite a bit of redness on area and also redness in groins. No personal hx SC.       History of Present Illness:  Mr. Marcus Cevallos is a 63 year old male who presents as a follow up for irritant dermatitis. 8/10/18 when he started Westcort cream for the irritant dermatitis. Today, the patient's aid, who presents with him, states that there is still redness in the groin area. This is improved.  No lesions of concern to specifically look at today.    Past Medical History:   Patient Active Problem List   Diagnosis     Cerebral palsy (H)     Mental retardation     Sleep apnea     Retinal detachment, left     Seizure disorder (H)     CVA (cerebral vascular accident) (H)     Hypothyroid     Osteopenia     Overweight (BMI 25.0-29.9)     Hypertension goal BP (blood pressure) < 130/80     Hyperlipidemia LDL goal <100     Blepharitis     Vitamin D deficiency disease     History of retinal detachment     Osteoporosis     Past Medical History:   Diagnosis Date     Cataract      Cerebral palsy (H)      CVA (cerebral vascular  accident) (H)      HTN (hypertension)      Hypothyroid      Mental retardation      Obesity, unspecified      Retinal detachment, left      Seizure disorder (H)      Sleep apnea      No past surgical history on file.    Social History:  Social History     Social History     Marital status: Single     Spouse name: N/A     Number of children: N/A     Years of education: N/A     Occupational History     Not on file.     Social History Main Topics     Smoking status: Never Smoker     Smokeless tobacco: Never Used     Alcohol use No     Drug use: No     Sexual activity: Not Currently     Other Topics Concern     Parent/Sibling W/ Cabg, Mi Or Angioplasty Before 65f 55m? No     Social History Narrative     Reviewed and unchanged but kept in chart for clinician convenience   He is in a care home.     Family History:   Family History   Problem Relation Age of Onset     Unknown/Adopted Mother      Hypertension Father      Not obtained at visit.     Medications:  Current Outpatient Prescriptions   Medication Sig Dispense Refill     ACETAMINOPHEN  mg by Oral or G tube route as needed for pain       ALBUTEROL SULFATE (2.5 MG/3ML) 0.083% IN NEBU as needed       alendronate (FOSAMAX) 70 MG tablet Take 1 tablet every 7 days via G tube. Take with water and without food or other medication for at least 30 minutes. 13 tablet 3     Aloe-Sodium Chloride (AYR SALINE NASAL GEL NA)        artificial tears OINT ophthalmic ointment Place into both eyes 3 times daily       Ascorbic Acid (VITAMIN C PO) Take 500 mg by mouth daily       aspirin 81 MG tablet Take 1 tablet by mouth daily. (*).   3     benzoyl peroxide 5 % LIQD Use as an anti-microbial wash in the tub every 2 days. 140 g 11     bisacodyl (DULCOLAX) 10 MG suppository Place 10 mg rectally daily as needed for constipation       CALCIUM CARBONATE 1250 MG/5ML OR SUSP 1 teaspoon down G tube daily 90 days 3     CARBAMIDE PEROXIDE 6.5 % OT SOLN as needed       cetirizine (ZYRTEC) 5  MG CHEW Take 5 mg by mouth daily       DIAZEPAM INTENSOL 5 MG/ML (HIGH CONC) solution TAKE 1ML (5MG) BUCCALLY AS NEEDED FOR CONVULSIVE SEIZURE >1 MINUTE, 3 MINUTES OF A NON-CONVULSIVE SEIZURE OR 2ND NON-CONVULSIVE SEIZURE (WIT 30 mL 1     dimethicone (REMEDY NUTRASHIELD) 1 %        econazole nitrate 1 % cream Apply 3x weekly to the groin. Okay to use twice daily with flares. 30 g 11     ergocalciferol (CALCIFEROL) 8000 UNIT/ML drops 12 drops per g tube twice daily 30 mL      Glycopyrrolate (ROBINUL PO)        guaiFENesin 400 MG TABS Per packet instructions as needed       hydrocortisone 2.5 % ointment Start twice daily for 4 weeks to red areas 454 g 0     LACTULOSE PO Take by mouth daily       levETIRAcetam (KEPPRA) 100 MG/ML solution GIVE 12.5ML (1250MG) PER G-TUBE TWICE DAILY 775 mL 11     levofloxacin (LEVAQUIN) 750 MG tablet Take by mouth daily 1 tab for 5 days - D/C 1.27.17       LEVOTHYROXINE SODIUM 50 MCG OR TABS 1 TABLET DAILY       loratadine (CLARITIN) 10 MG tablet Take 10 mg by mouth daily       Multiple Vitamins-Minerals (CERTAVITE SENIOR/ANTIOXIDANT PO)        mupirocin (BACTROBAN) 2 % ointment APPLY TO AFFECTED AREA(S) OF BUTTOCKS THREE TIMES DAILY *1 TOTAL FILL* 66 g 11     NEW MED Triple paste 40oz,  Use after brief changes groin and buttocks area 4 Container 3     nystatin (MYCOSTATIN) ointment Apply twice daily to the scrotum and left groin 120 g 1     NYSTATIN EX Externally apply topically as needed        order for DME Equipment being ordered: Wheel Chair Cushion 1 each 1     PATADAY 0.2 % OP SOLN None Entered       perampanel (FYCOMPA) 6 MG tablet TAKE 1 TABLET PER G-TUBE AT BEDTIME (SIGN NARC BOOK) **6 TOTAL FILLS* 31 tablet 5     Polyethylene Glycol 3350 (MIRALAX PO) Take 8.5 mg by mouth daily        polyvinyl alcohol (ARTIFICIAL TEARS) 1.4 % ophthalmic solution Instill one drop into both eyes 3 times a day. 6 mL 12     PROCTOSOL HC 2.5 % RE CREA as needed       REPLETE/FIBER OR LIQD 1 250cc  CAN PER GT 4X/DAY WITH 325CC WATER 4X/DAY=5.5 cans total  0     valproic acid (DEPAKENE) 250 MG/5ML SOLN syrup TAKE 10ML (500MG) PER G-TUBE TWICE DAILY. 620 mL 10     Allergies   Allergen Reactions     No Known Allergies      Review of Systems:  -Skin: As above in HPI. No additional skin concerns.  -Const: The patient is generally feeling well today.     Physical exam:  There were no vitals taken for this visit.  GEN: This is a well developed, well-nourished male in no acute distress, in a pleasant mood.    SKIN: Total skin excluding the undergarment areas was performed. The exam included the head/face, neck, both arms, chest, back, abdomen, both legs, digits and/or nails.   - Erythema and lichenification of the scrotum.   - Decreased erythema in the groin  - Erythematous plaques with scale on the left buttock,   - Erythema and scale in the left axilla.  - There are pink scaly patches and plaques on the right cheek.  -No other lesions of concern on areas examined.     Impression/Plan:  1. Rash with likely pressure ulcer, left buttock. Has seen multiple dermatologists for this in the past. Improved with hydrocortisone but failed to resolve. I reviewed with the care giver that we should re-evaluate the diagnoses. We should consider a biopsy. We should also culture today. Recommend  We engage guardian at next visit on conference call or have them come in, in person.     Hold bactroban/nystatin    Continue 5% BPO wash during every tub bath to prevent infections.     Hold westcort cream    Continue Desitin    Pt already has a donut pillow     YORDY was performed and was negative.     Start ketoconazole 2% cream. Apply to the affected area    Bacterial culture obtained.     Given inability to determine the cause of the rash, discussed that a biopsy would be helpful.      Follow-up within 4 weeks.     Staff Involved:  Scribe/Staff    Scribe Disclosure  I, Tino Garcia, am serving as a scribe to document services personally  performed by Dr. Pastora Peterson MD, based on data collection and the provider's statements to me.     Provider Disclosure:   The documentation recorded by the scribe accurately reflects the services I personally performed and the decisions made by me.    Pastora Peterson MD    Department of Dermatology  [facility]: Phone: [phone], Fax:[phone]  [facility]: Phone: [phone], Fax: [phone]

## 2018-09-21 NOTE — LETTER
9/21/2018         RE: Marcus Cevallos  [address] [facility] [address]        Dear Colleague,    Thank you for referring your patient, Marcus Cevallos, to the [facility]. Please see a copy of my visit note below.    [facility] Dermatology Note      Dermatology Problem List:  1. Intertrigo, groin  -Current Tx: Bactroban ointment (intiaited 6/13/2016), A&D ointment (initiated 10/16/2015)  -Previous Tx: Hibiclens (initiated 6/13/2016), clindamycin lotion (10/21/2015-6/13/2016), gentamicin ointment (initiated 10/21/2015), ketoconazole cream (10/16/2015-6/13/2016)  -s/p culture showing Klebsiella pneumoniae, Citrobacter freundii complex, Proteus mirabilis, Beta hemolytic Streptococcus group B and S. aureus 10/16/2015  2. Pressure Ulcer stage I-II: L posterior inguinal fold with freq superinfection and irritation from urine/stool.   -s/p swab 4/24/2017, MRSA and 2 other gram negative bacteria  -Current Tx: 5% BP wash, Neutroshield or AD ointment, Nystatin powder,      Encounter Date: Sep 21, 2018    CC:  Chief Complaint   Patient presents with     Skin Check     Recheck of buttock, Care giver states quite a bit of redness on area and also redness in groins. No personal hx SC.       History of Present Illness:  Mr. Marcus Cevallos is a 63 year old male who presents as a follow up for irritant dermatitis. 8/10/18 when he started Westcort cream for the irritant dermatitis. Today, the patient's aid, who presents with him, states that there is still redness in the groin area. This is improved.  No lesions of concern to specifically look at today.    Past Medical History:   Patient Active Problem List   Diagnosis     Cerebral palsy (H)     Mental retardation     Sleep apnea     Retinal detachment, left     Seizure disorder (H)     CVA (cerebral vascular accident) (H)     Hypothyroid     Osteopenia     Overweight (BMI 25.0-29.9)     Hypertension goal BP (blood  pressure) < 130/80     Hyperlipidemia LDL goal <100     Blepharitis     Vitamin D deficiency disease     History of retinal detachment     Osteoporosis     Past Medical History:   Diagnosis Date     Cataract      Cerebral palsy (H)      CVA (cerebral vascular accident) (H)      HTN (hypertension)      Hypothyroid      Mental retardation      Obesity, unspecified      Retinal detachment, left      Seizure disorder (H)      Sleep apnea      No past surgical history on file.    Social History:  Social History     Social History     Marital status: Single     Spouse name: N/A     Number of children: N/A     Years of education: N/A     Occupational History     Not on file.     Social History Main Topics     Smoking status: Never Smoker     Smokeless tobacco: Never Used     Alcohol use No     Drug use: No     Sexual activity: Not Currently     Other Topics Concern     Parent/Sibling W/ Cabg, Mi Or Angioplasty Before 65f 55m? No     Social History Narrative     Reviewed and unchanged but kept in chart for clinician convenience   He is in a care home.     Family History:   Family History   Problem Relation Age of Onset     Unknown/Adopted Mother      Hypertension Father      Not obtained at visit.     Medications:  Current Outpatient Prescriptions   Medication Sig Dispense Refill     ACETAMINOPHEN  mg by Oral or G tube route as needed for pain       ALBUTEROL SULFATE (2.5 MG/3ML) 0.083% IN NEBU as needed       alendronate (FOSAMAX) 70 MG tablet Take 1 tablet every 7 days via G tube. Take with water and without food or other medication for at least 30 minutes. 13 tablet 3     Aloe-Sodium Chloride (AYR SALINE NASAL GEL NA)        artificial tears OINT ophthalmic ointment Place into both eyes 3 times daily       Ascorbic Acid (VITAMIN C PO) Take 500 mg by mouth daily       aspirin 81 MG tablet Take 1 tablet by mouth daily. (*).   3     benzoyl peroxide 5 % LIQD Use as an anti-microbial wash in the tub every 2 days. 140 g  11     bisacodyl (DULCOLAX) 10 MG suppository Place 10 mg rectally daily as needed for constipation       CALCIUM CARBONATE 1250 MG/5ML OR SUSP 1 teaspoon down G tube daily 90 days 3     CARBAMIDE PEROXIDE 6.5 % OT SOLN as needed       cetirizine (ZYRTEC) 5 MG CHEW Take 5 mg by mouth daily       DIAZEPAM INTENSOL 5 MG/ML (HIGH CONC) solution TAKE 1ML (5MG) BUCCALLY AS NEEDED FOR CONVULSIVE SEIZURE >1 MINUTE, 3 MINUTES OF A NON-CONVULSIVE SEIZURE OR 2ND NON-CONVULSIVE SEIZURE (WIT 30 mL 1     dimethicone (REMEDY NUTRASHIELD) 1 %        econazole nitrate 1 % cream Apply 3x weekly to the groin. Okay to use twice daily with flares. 30 g 11     ergocalciferol (CALCIFEROL) 8000 UNIT/ML drops 12 drops per g tube twice daily 30 mL      Glycopyrrolate (ROBINUL PO)        guaiFENesin 400 MG TABS Per packet instructions as needed       hydrocortisone 2.5 % ointment Start twice daily for 4 weeks to red areas 454 g 0     LACTULOSE PO Take by mouth daily       levETIRAcetam (KEPPRA) 100 MG/ML solution GIVE 12.5ML (1250MG) PER G-TUBE TWICE DAILY 775 mL 11     levofloxacin (LEVAQUIN) 750 MG tablet Take by mouth daily 1 tab for 5 days - D/C 1.27.17       LEVOTHYROXINE SODIUM 50 MCG OR TABS 1 TABLET DAILY       loratadine (CLARITIN) 10 MG tablet Take 10 mg by mouth daily       Multiple Vitamins-Minerals (CERTAVITE SENIOR/ANTIOXIDANT PO)        mupirocin (BACTROBAN) 2 % ointment APPLY TO AFFECTED AREA(S) OF BUTTOCKS THREE TIMES DAILY *1 TOTAL FILL* 66 g 11     NEW MED Triple paste 40oz,  Use after brief changes groin and buttocks area 4 Container 3     nystatin (MYCOSTATIN) ointment Apply twice daily to the scrotum and left groin 120 g 1     NYSTATIN EX Externally apply topically as needed        order for DME Equipment being ordered: Wheel Chair Cushion 1 each 1     PATADAY 0.2 % OP SOLN None Entered       perampanel (FYCOMPA) 6 MG tablet TAKE 1 TABLET PER G-TUBE AT BEDTIME (SIGN NARC BOOK) **6 TOTAL FILLS* 31 tablet 5      Polyethylene Glycol 3350 (MIRALAX PO) Take 8.5 mg by mouth daily        polyvinyl alcohol (ARTIFICIAL TEARS) 1.4 % ophthalmic solution Instill one drop into both eyes 3 times a day. 6 mL 12     PROCTOSOL HC 2.5 % RE CREA as needed       REPLETE/FIBER OR LIQD 1 250cc CAN PER GT 4X/DAY WITH 325CC WATER 4X/DAY=5.5 cans total  0     valproic acid (DEPAKENE) 250 MG/5ML SOLN syrup TAKE 10ML (500MG) PER G-TUBE TWICE DAILY. 620 mL 10     Allergies   Allergen Reactions     No Known Allergies      Review of Systems:  -Skin: As above in HPI. No additional skin concerns.  -Const: The patient is generally feeling well today.     Physical exam:  There were no vitals taken for this visit.  GEN: This is a well developed, well-nourished male in no acute distress, in a pleasant mood.    SKIN: Total skin excluding the undergarment areas was performed. The exam included the head/face, neck, both arms, chest, back, abdomen, both legs, digits and/or nails.   - Erythema and lichenification of the scrotum.   - Decreased erythema in the groin  - Erythematous plaques with scale on the left buttock,   - Erythema and scale in the left axilla.  - There are pink scaly patches and plaques on the right cheek.  -No other lesions of concern on areas examined.     Impression/Plan:  1. Rash with likely pressure ulcer, left buttock. Has seen multiple dermatologists for this in the past. Improved with hydrocortisone but failed to resolve. I reviewed with the care giver that we should re-evaluate the diagnoses. We should consider a biopsy. We should also culture today. Recommend  We engage guardian at next visit on conference call or have them come in, in person.     Hold bactroban/nystatin    Continue 5% BPO wash during every tub bath to prevent infections.     Hold westcort cream    Continue Desitin    Pt already has a donut pillow     YORDY was performed and was negative.     Start ketoconazole 2% cream. Apply to the affected area    Bacterial culture  obtained.     Given inability to determine the cause of the rash, discussed that a biopsy would be helpful.      Follow-up within 4 weeks.     Staff Involved:  Scribe/Staff    Scribe Disclosure  I, Tino Garcia, am serving as a scribe to document services personally performed by Dr. Pastora Pteerson MD, based on data collection and the provider's statements to me.     Provider Disclosure:   The documentation recorded by the scribe accurately reflects the services I personally performed and the decisions made by me.    Pastora Peterson MD    Department of Dermatology  [facility]: Phone: [phone], Fax:[phone]  [facility] [place]: Phone: [phone], Fax: [phone]          Again, thank you for allowing me to participate in the care of your patient.        Sincerely,        Pastora Peterson MD

## 2018-09-21 NOTE — PATIENT INSTRUCTIONS
Start Ketaconazole with Desitin over to buttocks and hold Hydrocortisone.     Please have guardian accompany at next appointment or have available via phone in case of biopsy.     Please find out more of history of area and bring to next appointment.

## 2018-09-21 NOTE — NURSING NOTE
Marcus Cevallos's goals for this visit include:   Chief Complaint   Patient presents with     RECHECK     Recheck of buttock. Care giver states quite a bit of redness on area and also redness in groins.        He requests these members of his care team be copied on today's visit information:     PCP: Maria Eugenia Rogers    Referring Provider:  No referring provider defined for this encounter.    There were no vitals taken for this visit.    Do you need any medication refills at today's visit? No.    Conchis Blount LPN

## 2018-09-25 LAB
BACTERIA SPEC CULT: ABNORMAL
SPECIMEN SOURCE: ABNORMAL

## 2018-09-25 RX ORDER — GENTAMICIN SULFATE 1 MG/G
OINTMENT TOPICAL
Qty: 60 G | Refills: 1 | Status: SHIPPED | OUTPATIENT
Start: 2018-09-25 | End: 2018-10-18

## 2018-09-29 DIAGNOSIS — G40.319 GENERALIZED CONVULSIVE EPILEPSY WITH INTRACTABLE EPILEPSY (H): ICD-10-CM

## 2018-10-01 ENCOUNTER — TELEPHONE (OUTPATIENT)
Dept: DERMATOLOGY | Facility: CLINIC | Age: 63
End: 2018-10-01

## 2018-10-01 DIAGNOSIS — L30.9 DERMATITIS: Primary | ICD-10-CM

## 2018-10-01 NOTE — TELEPHONE ENCOUNTER
Notes Recorded by Hali Flores RN on 10/1/2018 at 12:08 PM  Butch called to review 's note above and to confirm appt on 11/1/18 at 1:15 with  for follow up. No answer. VM id's Butch. Left detailed message with information above and for Butch to call the clinic back at [phone] with questions or concerns...Hali Flores RN    ------    Notes Recorded by Nat Myers, RN on 9/26/2018 at 8:03 AM  I left a message for Butch (negar), to call [facility].  Does he need orders faxed to facility?  Nat Myers RN    ------    Notes Recorded by Pastora Peterson MD on 9/25/2018 at 5:36 PM  I wrote script for gentamycin. I spoke with Butch. I relayed growth of bacteria.     Stop ketoconazole.     Follow up in 4 weeks. Will have RN also notify guardian.

## 2018-10-02 RX ORDER — PERAMPANEL 6 MG/1
TABLET ORAL
Qty: 31 TABLET | Refills: 5 | Status: SHIPPED | OUTPATIENT
Start: 2018-10-02 | End: 2018-10-03

## 2018-10-03 DIAGNOSIS — G40.319 GENERALIZED CONVULSIVE EPILEPSY WITH INTRACTABLE EPILEPSY (H): ICD-10-CM

## 2018-10-05 DIAGNOSIS — G40.319 GENERALIZED CONVULSIVE EPILEPSY WITH INTRACTABLE EPILEPSY (H): ICD-10-CM

## 2018-10-05 RX ORDER — PERAMPANEL 6 MG/1
TABLET ORAL
Qty: 7 TABLET | Refills: 0 | Status: SHIPPED | OUTPATIENT
Start: 2018-10-05 | End: 2018-10-05

## 2018-10-09 RX ORDER — PERAMPANEL 6 MG/1
TABLET ORAL
Qty: 31 TABLET | Refills: 5 | Status: SHIPPED | OUTPATIENT
Start: 2018-10-09 | End: 2018-10-11

## 2018-10-10 DIAGNOSIS — G40.419 INTRACTABLE GENERALIZED TONIC-CLONIC EPILEPSY (H): ICD-10-CM

## 2018-10-11 ENCOUNTER — TELEPHONE (OUTPATIENT)
Dept: NEUROLOGY | Facility: CLINIC | Age: 63
End: 2018-10-11

## 2018-10-11 DIAGNOSIS — G40.319 GENERALIZED CONVULSIVE EPILEPSY WITH INTRACTABLE EPILEPSY (H): ICD-10-CM

## 2018-10-11 RX ORDER — DIAZEPAM INTENSOL 5 MG/ML
SOLUTION, CONCENTRATE ORAL
Qty: 30 ML | Refills: 1 | Status: SHIPPED | OUTPATIENT
Start: 2018-10-11 | End: 2019-02-19

## 2018-10-16 NOTE — TELEPHONE ENCOUNTER
I left a message for Butch to call [facility].  Patient needs a skin biopsy.  Per last office visit, guardian should be present either via phone or in person.  Nat Myers RN

## 2018-10-16 NOTE — TELEPHONE ENCOUNTER
Health Call Center    Phone Message    May a detailed message be left on voicemail: yes    Reason for Call: Symptoms or Concerns     If patient has red-flag symptoms, warm transfer to triage line    Current symptom or concern: Patients group home is calling to follow up with Dr. Peterson stating that patients infection has not improved and getting worse, spreading. Please call to discuss what next steps to take if they should hold off on filling the gentamycin or what is advised          Action Taken: Message routed to:  Adult Clinics: Dermatology p [id number]

## 2018-10-17 RX ORDER — TRIAMCINOLONE ACETONIDE 1 MG/G
OINTMENT TOPICAL
Qty: 80 G | Refills: 1 | Status: SHIPPED | OUTPATIENT
Start: 2018-10-17 | End: 2018-11-01

## 2018-10-17 NOTE — TELEPHONE ENCOUNTER
I spoke with Butch.  He states Marcus's rash in the groin is becoming more inflamed and Marcus seems to be in more discomfort.  Butch states there is a dime-sized open area in the groin that weeps clear fluid and when it is cleaned there is pinpoint bleeding.    They are bathing with BPO wash, applying gentamicin ointment TID and Desitin with brief changes.    I scheduled him for a possible skin biopsy and evaluation with Dr. Peterson on 10/23/18.  I notified Butch that the guardian should be present or on the phone for a conference call during that visit.  He verbalized understanding and will contact the guardian to arrange.    Will route to Dr. Peterson to advise if there should be any changes to treatment regimen in the meantime.    Nat Myers RN

## 2018-10-17 NOTE — TELEPHONE ENCOUNTER
Patients guardian returned clinics call, requesting a call back today at the home # [phone] Thank you

## 2018-10-18 NOTE — TELEPHONE ENCOUNTER
"Per Dr. Peterson,\"This is likely the irritant dermatitis now getting worse . Stop BP wash and gentamycin. Start triamcinolone ointment BID for 3 weeks, thick. Use chlorhexidine as wash once daily. See prescriptions, call if worsening on this regimen.\"    I confirmed with Dr. Peterson that we should still keep his appointments as scheduled in case they are needed.    I spoke with Butch and notified him of the plan.  He verbalized understanding and had no questions.  Nat Myers RN    "

## 2018-11-01 ENCOUNTER — OFFICE VISIT (OUTPATIENT)
Dept: DERMATOLOGY | Facility: CLINIC | Age: 63
End: 2018-11-01
Payer: MEDICARE

## 2018-11-01 DIAGNOSIS — L30.9 DERMATITIS: Primary | ICD-10-CM

## 2018-11-01 DIAGNOSIS — L98.9 SKIN EROSION: ICD-10-CM

## 2018-11-01 PROCEDURE — 99213 OFFICE O/P EST LOW 20 MIN: CPT | Performed by: DERMATOLOGY

## 2018-11-01 RX ORDER — TRIAMCINOLONE ACETONIDE 1 MG/G
OINTMENT TOPICAL
Qty: 80 G | Refills: 1 | Status: SHIPPED | OUTPATIENT
Start: 2018-11-01 | End: 2019-05-24

## 2018-11-01 NOTE — MR AVS SNAPSHOT
After Visit Summary   11/1/2018    Marcus Cevallos    MRN: [id number]           Patient Information     Date Of Birth          1955        Visit Information        Provider Department      11/1/2018 1:15 PM Pastora Peterson MD [facility]        Today's Diagnoses     Dermatitis    -  1    Skin erosion           Follow-ups after your visit        Additional Services     PHYSIATRY REFERRAL       Your provider has referred you to: [facility]: Physical Medicine and Rehabilitation [facility] [phone]   [email or web address]     Please note these locations do not prescribe narcotics or manage chronic pain.    Please be aware that coverage of these services is subject to the terms and limitations of your health insurance plan.  Call member services at your health plan with any benefit or coverage questions.      Please bring the following to your appointment:  >>   Any x-rays, CTs or MRIs which have been performed.  Contact the facility where they were done to arrange for  prior to your scheduled appointment.    >>   List of current medications   >>   This referral request   >>   Any documents/labs given to you for this referral            WOUND CARE REFERRAL       Your provider has referred you to: [facility] [phone]  [email or web address]  [facility] [phone]   [email or web address]    Reason for referral: Wound care      1. WOC Wound Consult appointment is related to what kind of wound: Pressure Ulcer    2. Location of wound: Buttocks    3. Reason for referral: Assess and treat as indicated    4. Desired treatment if any: referral   Please be aware that coverage of these services is subject to the terms and limitations of your health insurance plan.  Call member services at your health plan with any  benefit or coverage questions.      Please bring the following with you to your appointment:    (1) Any X-Rays, CTs or MRIs which have been performed.  Contact the facility where they were done to arrange for  prior to your scheduled appointment.    (2) List of current medications   (3) This referral request   (4) Any documents/labs given to you for this referral                  Follow-up notes from your care team     Return in about 2 months (around 1/1/2019).      Your next 10 appointments already scheduled     Nov 05, 2018  2:00 PM CST   New Visit with Anthony Summers OD   [facility] ([facility])    [address] [id number] [phone]            Jan 03, 2019  2:30 PM CST   Return Visit with Pastora Peterson MD   [facility] ([facility])    [address] [place] [id number] [phone]              Who to contact     If you have questions or need follow up information about today's clinic visit or your schedule please contact [facility] directly at [phone].  Normal or non-critical lab and imaging results will be communicated to you by MyChart, letter or phone within 4 business days after the clinic has received the results. If you do not hear from us within 7 days, please contact the clinic through MyChart or phone. If you have a critical or abnormal lab result, we will notify you by phone as soon as possible.  Submit refill requests through Ideatory or call your pharmacy and they will forward the refill request to us. Please allow 3 business days for your refill to be completed.          Additional Information About Your Visit        Ideatory Information     Ideatory is an electronic gateway that provides easy, online access to your medical records. With Ideatory, you can request a clinic appointment, read your test results, renew a prescription or communicate with your  care team.     To sign up for icixhart visit the website at [email or web address]   You will be asked to enter the access code listed below, as well as some personal information. Please follow the directions to create your username and password.     Your access code is: [email or web address]  Expires: 2019  1:51 PM     Your access code will  in 90 days. If you need help or a new code, please contact your [facility] or call [phone] for assistance.        Care EveryWhere ID     This is your Care EveryWhere ID. This could be used by other organizations to access your [facility] medical records  [id number]         Blood Pressure from Last 3 Encounters:   18 112/70   17 109/43   17 137/72    Weight from Last 3 Encounters:   17 68 kg (149 lb 14.4 oz)   17 67.8 kg (149 lb 8 oz)   16 67.9 kg (149 lb 9.6 oz)              We Performed the Following     PHYSIATRY REFERRAL     WOUND CARE REFERRAL          Today's Medication Changes          These changes are accurate as of 18  2:02 PM.  If you have any questions, ask your nurse or doctor.               These medicines have changed or have updated prescriptions.        Dose/Directions    triamcinolone 0.1 % ointment   Commonly known as:  KENALOG   This may have changed:  additional instructions   Used for:  Dermatitis   Changed by:  Pastora Peterson MD        Apply twice daily for 2 weeks followed by Desitin on top.   Quantity:  80 g   Refills:  1            Where to get your medicines      These medications were sent to [facility] - [place], [place] - [address]  [address] [facility] [address]     Phone:  [phone]     triamcinolone 0.1 % ointment                Primary Care Provider Office Phone # Fax #    Maria Eugenia Sarah Dowell [phone]       [facility] [address]  [facility] [address]        Equal Access  to Services     JEY MCCALL : Hadii aad ku hadsamieze Mcdermott, wasaniyada luqadaha, qagilmata kaalmaizabel rutherford, serena lawson. So [facility] [phone].    ATENCIÓN: Si moniquela moshe, tiene a wells disposición servicios gratuitos de asistencia lingüística. Llame al [phone].    We comply with applicable federal civil rights laws and [place] laws. We do not discriminate on the basis of race, color, national origin, age, disability, sex, sexual orientation, or gender identity.            Thank you!     Thank you for choosing [facility]  for your care. Our goal is always to provide you with excellent care. Hearing back from our patients is one way we can continue to improve our services. Please take a few minutes to complete the written survey that you may receive in the mail after your visit with us. Thank you!             Your Updated Medication List - Protect others around you: Learn how to safely use, store and throw away your medicines at www.disposemymeds.org.          This list is accurate as of 11/1/18  2:02 PM.  Always use your most recent med list.                   Brand Name Dispense Instructions for use Diagnosis    ACETAMINOPHEN PO      325 mg by Oral or G tube route as needed for pain        albuterol (2.5 MG/3ML) 0.083% neb solution      as needed        alendronate 70 MG tablet    FOSAMAX    13 tablet    Take 1 tablet every 7 days via G tube. Take with water and without food or other medication for at least 30 minutes.    Osteoporosis       artificial tears Oint ophthalmic ointment      Place into both eyes 3 times daily        aspirin 81 MG tablet      Take 1 tablet by mouth daily. (*).        AYR SALINE NASAL GEL NA           bisacodyl 10 MG Suppository    DULCOLAX     Place 10 mg rectally daily as needed for constipation        CALCIFEROL 8000 UNIT/ML drops   Generic drug:  vitamin D2 (Ergocalciferol)     30 mL    12 drops per g tube twice daily        calcium  carbonate 1250 (500 Ca) MG/5ML Susp suspension     90 days    1 teaspoon down G tube daily    Cerebral palsy (H)       carbamide peroxide 6.5 % otic solution    DEBROX     as needed        CERTAVITE SENIOR/ANTIOXIDANT PO           cetirizine 5 MG Chew    zyrTEC     Take 5 mg by mouth daily        chlorhexidine 4 % liquid    HIBICLENS    118 mL    Apply once daily the groin and buttocks as wash    Dermatitis       DIAZEPAM INTENSOL 5 MG/ML (HIGH CONC) solution   Generic drug:  diazepam     30 mL    TAKE 1ML (5MG) BUCCALLY AS NEEDED FOR CONVULSIVE SEIZURE >1 MINUTE, 3 MINUTES OF A NON-CONVULSIVE SEIZURE OR 2ND NON-CONVULSIVE SEIZURE (WIT    Intractable generalized tonic-clonic epilepsy (H)       econazole nitrate 1 % cream     30 g    Apply 3x weekly to the groin. Okay to use twice daily with flares.    Intertrigo       guaiFENesin 400 MG Tabs      Per packet instructions as needed        hydrocortisone 2.5 % ointment     454 g    Start twice daily for 4 weeks to red areas    Irritant dermatitis       LACTULOSE PO      Take by mouth daily        levETIRAcetam 100 MG/ML solution    KEPPRA    775 mL    GIVE 12.5ML (1250MG) PER G-TUBE TWICE DAILY    Seizure disorder (H)       levofloxacin 750 MG tablet    LEVAQUIN     Take by mouth daily 1 tab for 5 days - D/C 1.27.17        levothyroxine 50 MCG tablet    SYNTHROID/LEVOTHROID     1 TABLET DAILY        loratadine 10 MG tablet    CLARITIN     Take 10 mg by mouth daily        MIRALAX PO      Take 8.5 mg by mouth daily        mupirocin 2 % ointment    BACTROBAN    66 g    APPLY TO AFFECTED AREA(S) OF BUTTOCKS THREE TIMES DAILY *1 TOTAL FILL*    Impetigo, Healing decubitus ulcer, unspecified ulcer stage       NEW MED     4 Container    Triple paste 40oz,  Use after brief changes groin and buttocks area    Impetigo, Candidal intertrigo       * NYSTATIN EX      Externally apply topically as needed        * nystatin ointment    MYCOSTATIN    120 g    Apply twice daily to the  scrotum and left groin    Candidal intertrigo       order for DME     1 each    Equipment being ordered: Wheel Chair Cushion    Skin erosion       PATADAY 0.2 % Soln   Generic drug:  olopatadine HCl      None Entered        perampanel 6 MG tablet    FYCOMPA    31 tablet    TAKE 1 TABLET PER G-TUBE AT BEDTIME (SIGN NARC BOOK)    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    Keratitis sicca, bilateral       PROCTOSOL HC 2.5 % cream   Generic drug:  hydrocortisone      as needed        psyllium 58.6 % Powd    METAMUCIL     Take 1 teaspoonful by mouth At Bedtime        REMEDY NUTRASHIELD 1 %   Generic drug:  dimethicone           REPLETE/FIBER Liqd      1 250cc CAN PER GT 4X/DAY WITH 325CC WATER 4X/DAY=5.5 cans total    Obesity       ROBINUL PO           triamcinolone 0.1 % ointment    KENALOG    80 g    Apply twice daily for 2 weeks followed by Desitin on top.    Dermatitis       valproic acid 250 MG/5ML Soln syrup    DEPAKENE    620 mL    TAKE 10ML (500MG) PER G-TUBE TWICE DAILY.    Seizure disorder (H)       VITAMIN C PO      Take 500 mg by mouth daily        * Notice:  This list has 2 medication(s) that are the same as other medications prescribed for you. Read the directions carefully, and ask your doctor or other care provider to review them with you.

## 2018-11-01 NOTE — NURSING NOTE
Marcus Cevallos's goals for this visit include:   Chief Complaint   Patient presents with     Derm Problem     Rash groin is better.      Derm Problem     Red rashes on hands and neck.  Present for 2 weeks.  Has been using hydrocortisone.       He requests these members of his care team be copied on today's visit information: Maria Eugenia Rogers      PCP: Maria Eugenia Rogers    Referring Provider:  No referring provider defined for this encounter.    Nat Myers RN

## 2018-11-01 NOTE — LETTER
11/1/2018         RE: Marcus Cevallos  [address] [address]        Dear Colleague,    Thank you for referring your patient, Marcus Cevallos, to the [facility]. Please see a copy of my visit note below.    [facility] Dermatology Note      Dermatology Problem List:  1. Intertrigo, groin  -Current Tx: Bactroban ointment (intiaited 6/13/2016), A&D ointment (initiated 10/16/2015)  -Previous Tx: Hibiclens (initiated 6/13/2016), clindamycin lotion (10/21/2015-6/13/2016), gentamicin ointment (initiated 10/21/2015), ketoconazole cream (10/16/2015-6/13/2016)  -s/p culture showing Klebsiella pneumoniae, Citrobacter freundii complex, Proteus mirabilis, Beta hemolytic Streptococcus group B and S. aureus 10/16/2015  2. Pressure Ulcer stage I-II: L posterior inguinal fold with freq superinfection and irritation from urine/stool.   -s/p swab 4/24/2017, MRSA and 2 other gram negative bacteria  -Current Tx: 5% BP wash, Neutroshield or AD ointment, Nystatin powder,      Encounter Date: Nov 1, 2018    CC:  Chief Complaint   Patient presents with     Derm Problem     Rash groin is better.      Derm Problem     Red rashes on hands and neck.  Present for 2 weeks.  Has been using hydrocortisone.       History of Present Illness:  Mr. Marcus Cevallos is a 63 year old male who presents as a follow up for irritant dermatitis. 9/21/18 when he started triamcinolone for rash on the buttocks and groin. Today, the pt's brother, who presents with him, says that the rash in the groin was much improved upon starting triamcinolone. However, he broke out with a new rash around the hands and neck about two weeks ago. Applying hydrocortisone has had a marginal effect, his brother thinks.     Akbar, guardian on phone today, care taker Butch, present today.     Past Medical History:   Patient Active Problem List   Diagnosis     Cerebral palsy (H)     Mental retardation     Sleep  apnea     Retinal detachment, left     Seizure disorder (H)     CVA (cerebral vascular accident) (H)     Hypothyroid     Osteopenia     Overweight (BMI 25.0-29.9)     Hypertension goal BP (blood pressure) < 130/80     Hyperlipidemia LDL goal <100     Blepharitis     Vitamin D deficiency disease     History of retinal detachment     Osteoporosis     Past Medical History:   Diagnosis Date     Cataract      Cerebral palsy (H)      CVA (cerebral vascular accident) (H)      HTN (hypertension)      Hypothyroid      Mental retardation      Obesity, unspecified      Retinal detachment, left      Seizure disorder (H)      Sleep apnea      History reviewed. No pertinent surgical history.    Social History:  Social History     Social History     Marital status: Single     Spouse name: N/A     Number of children: N/A     Years of education: N/A     Occupational History     Not on file.     Social History Main Topics     Smoking status: Never Smoker     Smokeless tobacco: Never Used     Alcohol use No     Drug use: No     Sexual activity: Not Currently     Other Topics Concern     Parent/Sibling W/ Cabg, Mi Or Angioplasty Before 65f 55m? No     Social History Narrative     Reviewed and unchanged but kept in chart for clinician convenience   He is in a care home.     Family History:   Family History   Problem Relation Age of Onset     Unknown/Adopted Mother      Hypertension Father      Not obtained at visit.     Medications:  Current Outpatient Prescriptions   Medication Sig Dispense Refill     psyllium (METAMUCIL) 58.6 % POWD Take 1 teaspoonful by mouth At Bedtime       ACETAMINOPHEN  mg by Oral or G tube route as needed for pain       ALBUTEROL SULFATE (2.5 MG/3ML) 0.083% IN NEBU as needed       alendronate (FOSAMAX) 70 MG tablet Take 1 tablet every 7 days via G tube. Take with water and without food or other medication for at least 30 minutes. 13 tablet 3     Aloe-Sodium Chloride (AYR SALINE NASAL GEL NA)         artificial tears OINT ophthalmic ointment Place into both eyes 3 times daily       Ascorbic Acid (VITAMIN C PO) Take 500 mg by mouth daily       aspirin 81 MG tablet Take 1 tablet by mouth daily. (*).   3     bisacodyl (DULCOLAX) 10 MG suppository Place 10 mg rectally daily as needed for constipation       CALCIUM CARBONATE 1250 MG/5ML OR SUSP 1 teaspoon down G tube daily 90 days 3     CARBAMIDE PEROXIDE 6.5 % OT SOLN as needed       cetirizine (ZYRTEC) 5 MG CHEW Take 5 mg by mouth daily       chlorhexidine (HIBICLENS) 4 % liquid Apply once daily the groin and buttocks as wash 118 mL 3     DIAZEPAM INTENSOL 5 MG/ML (HIGH CONC) solution TAKE 1ML (5MG) BUCCALLY AS NEEDED FOR CONVULSIVE SEIZURE >1 MINUTE, 3 MINUTES OF A NON-CONVULSIVE SEIZURE OR 2ND NON-CONVULSIVE SEIZURE (WIT 30 mL 1     dimethicone (REMEDY NUTRASHIELD) 1 %        econazole nitrate 1 % cream Apply 3x weekly to the groin. Okay to use twice daily with flares. 30 g 11     ergocalciferol (CALCIFEROL) 8000 UNIT/ML drops 12 drops per g tube twice daily 30 mL      Glycopyrrolate (ROBINUL PO)        guaiFENesin 400 MG TABS Per packet instructions as needed       hydrocortisone 2.5 % ointment Start twice daily for 4 weeks to red areas 454 g 0     LACTULOSE PO Take by mouth daily       levETIRAcetam (KEPPRA) 100 MG/ML solution GIVE 12.5ML (1250MG) PER G-TUBE TWICE DAILY 775 mL 11     levofloxacin (LEVAQUIN) 750 MG tablet Take by mouth daily 1 tab for 5 days - D/C 1.27.17       LEVOTHYROXINE SODIUM 50 MCG OR TABS 1 TABLET DAILY       loratadine (CLARITIN) 10 MG tablet Take 10 mg by mouth daily       Multiple Vitamins-Minerals (CERTAVITE SENIOR/ANTIOXIDANT PO)        mupirocin (BACTROBAN) 2 % ointment APPLY TO AFFECTED AREA(S) OF BUTTOCKS THREE TIMES DAILY *1 TOTAL FILL* 66 g 11     NEW MED Triple paste 40oz,  Use after brief changes groin and buttocks area 4 Container 3     nystatin (MYCOSTATIN) ointment Apply twice daily to the scrotum and left groin 120 g 1      NYSTATIN EX Externally apply topically as needed        order for DME Equipment being ordered: Wheel Chair Cushion 1 each 1     PATADAY 0.2 % OP SOLN None Entered       perampanel (FYCOMPA) 6 MG tablet TAKE 1 TABLET PER G-TUBE AT BEDTIME (SIGN NARC BOOK) 31 tablet 5     Polyethylene Glycol 3350 (MIRALAX PO) Take 8.5 mg by mouth daily        polyvinyl alcohol (ARTIFICIAL TEARS) 1.4 % ophthalmic solution Instill one drop into both eyes 3 times a day. 6 mL 12     PROCTOSOL HC 2.5 % RE CREA as needed       REPLETE/FIBER OR LIQD 1 250cc CAN PER GT 4X/DAY WITH 325CC WATER 4X/DAY=5.5 cans total  0     triamcinolone (KENALOG) 0.1 % ointment Apply twice daily for 3 weeks. 80 g 1     valproic acid (DEPAKENE) 250 MG/5ML SOLN syrup TAKE 10ML (500MG) PER G-TUBE TWICE DAILY. 620 mL 10     Allergies   Allergen Reactions     No Known Allergies      Review of Systems:  -Skin: As above in HPI. No additional skin concerns.  -Const: The patient is generally feeling well today.     Physical exam:  There were no vitals taken for this visit.  GEN: This is a well developed, well-nourished male in no acute distress, in a pleasant mood.    SKIN: Total skin excluding the undergarment areas was performed. The exam included the head/face, neck, both arms, chest, back, abdomen, both legs, digits and/or nails.   - Erythematous scaly plaque in the left groin  - The right groin exhibits hyperpigmented macules  - 1 cm erosion with surrounding erythema and scale on the left buttock. .  -No other lesions of concern on areas examined.     Impression/Plan:  1. Rash, left buttock and left groin, most likely irritant dermatitis, consider contact dermatitis, psoriasis or rare even a skin cancer. He has an erosion centrally with scar that suggest a prior decubitus ulcer. Reviewed with Guardian Akbar. We will hold off on biopsy and proceed with second opinions    Continue triamcinolone with Desitin on top for two weeks, then Desitin only if tolerated. Call  if he flares    Recommend referral to wound care clinic    Recommend PM and R eval    Consider biopsy, recommended if no changes from other services.     Recommend open air as much as we can    Rotate patient- would like wound care clinic to may recommendation on his chair and or position as chronic pressure in this area is causing issues.       Follow-up in     Staff Involved:  Scribe/Staff    Scribe Disclosure  I, Tino Garcia, am serving as a scribe to document services personally performed by Dr. Pastora Peterson MD, based on data collection and the provider's statements to me.     Provider Disclosure:   The documentation recorded by the scribe accurately reflects the services I personally performed and the decisions made by me.    Pastora Peterson MD    Department of Dermatology  [facility]: Phone: [phone], Fax:[phone]  [facility]: Phone: [phone], Fax: [phone]        Again, thank you for allowing me to participate in the care of your patient.        Sincerely,        Pastora Peterson MD

## 2018-11-01 NOTE — PROGRESS NOTES
[facility] Dermatology Note      Dermatology Problem List:  1. Intertrigo, groin  -Current Tx: Bactroban ointment (intiaited 6/13/2016), A&D ointment (initiated 10/16/2015)  -Previous Tx: Hibiclens (initiated 6/13/2016), clindamycin lotion (10/21/2015-6/13/2016), gentamicin ointment (initiated 10/21/2015), ketoconazole cream (10/16/2015-6/13/2016)  -s/p culture showing Klebsiella pneumoniae, Citrobacter freundii complex, Proteus mirabilis, Beta hemolytic Streptococcus group B and S. aureus 10/16/2015  2. Pressure Ulcer stage I-II: L posterior inguinal fold with freq superinfection and irritation from urine/stool.   -s/p swab 4/24/2017, MRSA and 2 other gram negative bacteria  -Current Tx: 5% BP wash, Neutroshield or AD ointment, Nystatin powder,      Encounter Date: Nov 1, 2018    CC:  Chief Complaint   Patient presents with     Derm Problem     Rash groin is better.      Derm Problem     Red rashes on hands and neck.  Present for 2 weeks.  Has been using hydrocortisone.       History of Present Illness:  Mr. Marcus Cevallos is a 63 year old male who presents as a follow up for irritant dermatitis. 9/21/18 when he started triamcinolone for rash on the buttocks and groin. Today, the pt's brother, who presents with him, says that the rash in the groin was much improved upon starting triamcinolone. However, he broke out with a new rash around the hands and neck about two weeks ago. Applying hydrocortisone has had a marginal effect, his brother thinks.     Akbar, guardian on phone today, care taker Butch, present today.     Past Medical History:   Patient Active Problem List   Diagnosis     Cerebral palsy (H)     Mental retardation     Sleep apnea     Retinal detachment, left     Seizure disorder (H)     CVA (cerebral vascular accident) (H)     Hypothyroid     Osteopenia     Overweight (BMI 25.0-29.9)     Hypertension goal BP (blood pressure) < 130/80     Hyperlipidemia LDL goal <100     Blepharitis      Vitamin D deficiency disease     History of retinal detachment     Osteoporosis     Past Medical History:   Diagnosis Date     Cataract      Cerebral palsy (H)      CVA (cerebral vascular accident) (H)      HTN (hypertension)      Hypothyroid      Mental retardation      Obesity, unspecified      Retinal detachment, left      Seizure disorder (H)      Sleep apnea      History reviewed. No pertinent surgical history.    Social History:  Social History     Social History     Marital status: Single     Spouse name: N/A     Number of children: N/A     Years of education: N/A     Occupational History     Not on file.     Social History Main Topics     Smoking status: Never Smoker     Smokeless tobacco: Never Used     Alcohol use No     Drug use: No     Sexual activity: Not Currently     Other Topics Concern     Parent/Sibling W/ Cabg, Mi Or Angioplasty Before 65f 55m? No     Social History Narrative     Reviewed and unchanged but kept in chart for clinician convenience   He is in a care home.     Family History:   Family History   Problem Relation Age of Onset     Unknown/Adopted Mother      Hypertension Father      Not obtained at visit.     Medications:  Current Outpatient Prescriptions   Medication Sig Dispense Refill     psyllium (METAMUCIL) 58.6 % POWD Take 1 teaspoonful by mouth At Bedtime       ACETAMINOPHEN  mg by Oral or G tube route as needed for pain       ALBUTEROL SULFATE (2.5 MG/3ML) 0.083% IN NEBU as needed       alendronate (FOSAMAX) 70 MG tablet Take 1 tablet every 7 days via G tube. Take with water and without food or other medication for at least 30 minutes. 13 tablet 3     Aloe-Sodium Chloride (AYR SALINE NASAL GEL NA)        artificial tears OINT ophthalmic ointment Place into both eyes 3 times daily       Ascorbic Acid (VITAMIN C PO) Take 500 mg by mouth daily       aspirin 81 MG tablet Take 1 tablet by mouth daily. (*).   3     bisacodyl (DULCOLAX) 10 MG suppository Place 10 mg rectally  daily as needed for constipation       CALCIUM CARBONATE 1250 MG/5ML OR SUSP 1 teaspoon down G tube daily 90 days 3     CARBAMIDE PEROXIDE 6.5 % OT SOLN as needed       cetirizine (ZYRTEC) 5 MG CHEW Take 5 mg by mouth daily       chlorhexidine (HIBICLENS) 4 % liquid Apply once daily the groin and buttocks as wash 118 mL 3     DIAZEPAM INTENSOL 5 MG/ML (HIGH CONC) solution TAKE 1ML (5MG) BUCCALLY AS NEEDED FOR CONVULSIVE SEIZURE >1 MINUTE, 3 MINUTES OF A NON-CONVULSIVE SEIZURE OR 2ND NON-CONVULSIVE SEIZURE (WIT 30 mL 1     dimethicone (REMEDY NUTRASHIELD) 1 %        econazole nitrate 1 % cream Apply 3x weekly to the groin. Okay to use twice daily with flares. 30 g 11     ergocalciferol (CALCIFEROL) 8000 UNIT/ML drops 12 drops per g tube twice daily 30 mL      Glycopyrrolate (ROBINUL PO)        guaiFENesin 400 MG TABS Per packet instructions as needed       hydrocortisone 2.5 % ointment Start twice daily for 4 weeks to red areas 454 g 0     LACTULOSE PO Take by mouth daily       levETIRAcetam (KEPPRA) 100 MG/ML solution GIVE 12.5ML (1250MG) PER G-TUBE TWICE DAILY 775 mL 11     levofloxacin (LEVAQUIN) 750 MG tablet Take by mouth daily 1 tab for 5 days - D/C 1.27.17       LEVOTHYROXINE SODIUM 50 MCG OR TABS 1 TABLET DAILY       loratadine (CLARITIN) 10 MG tablet Take 10 mg by mouth daily       Multiple Vitamins-Minerals (CERTAVITE SENIOR/ANTIOXIDANT PO)        mupirocin (BACTROBAN) 2 % ointment APPLY TO AFFECTED AREA(S) OF BUTTOCKS THREE TIMES DAILY *1 TOTAL FILL* 66 g 11     NEW MED Triple paste 40oz,  Use after brief changes groin and buttocks area 4 Container 3     nystatin (MYCOSTATIN) ointment Apply twice daily to the scrotum and left groin 120 g 1     NYSTATIN EX Externally apply topically as needed        order for DME Equipment being ordered: Wheel Chair Cushion 1 each 1     PATADAY 0.2 % OP SOLN None Entered       perampanel (FYCOMPA) 6 MG tablet TAKE 1 TABLET PER G-TUBE AT BEDTIME (SIGN NARC BOOK) 31 tablet 5      Polyethylene Glycol 3350 (MIRALAX PO) Take 8.5 mg by mouth daily        polyvinyl alcohol (ARTIFICIAL TEARS) 1.4 % ophthalmic solution Instill one drop into both eyes 3 times a day. 6 mL 12     PROCTOSOL HC 2.5 % RE CREA as needed       REPLETE/FIBER OR LIQD 1 250cc CAN PER GT 4X/DAY WITH 325CC WATER 4X/DAY=5.5 cans total  0     triamcinolone (KENALOG) 0.1 % ointment Apply twice daily for 3 weeks. 80 g 1     valproic acid (DEPAKENE) 250 MG/5ML SOLN syrup TAKE 10ML (500MG) PER G-TUBE TWICE DAILY. 620 mL 10     Allergies   Allergen Reactions     No Known Allergies      Review of Systems:  -Skin: As above in HPI. No additional skin concerns.  -Const: The patient is generally feeling well today.     Physical exam:  There were no vitals taken for this visit.  GEN: This is a well developed, well-nourished male in no acute distress, in a pleasant mood.    SKIN: Total skin excluding the undergarment areas was performed. The exam included the head/face, neck, both arms, chest, back, abdomen, both legs, digits and/or nails.   - Erythematous scaly plaque in the left groin  - The right groin exhibits hyperpigmented macules  - 1 cm erosion with surrounding erythema and scale on the left buttock. .  -No other lesions of concern on areas examined.     Impression/Plan:  1. Rash, left buttock and left groin, most likely irritant dermatitis, consider contact dermatitis, psoriasis or rare even a skin cancer. He has an erosion centrally with scar that suggest a prior decubitus ulcer. Reviewed with Guardian Akbar. We will hold off on biopsy and proceed with second opinions    Continue triamcinolone with Desitin on top for two weeks, then Desitin only if tolerated. Call if he flares    Recommend referral to wound care clinic    Recommend PM and R derek    Consider biopsy, recommended if no changes from other services.     Recommend open air as much as we can    Rotate patient- would like wound care clinic to may recommendation on his  chair and or position as chronic pressure in this area is causing issues.       Follow-up in     Staff Involved:  Scribe/Staff    Scribe Disclosure  I, Tino Garcia, am serving as a scribe to document services personally performed by Dr. Pastora Peterson MD, based on data collection and the provider's statements to me.     Provider Disclosure:   The documentation recorded by the scribe accurately reflects the services I personally performed and the decisions made by me.    Pastora Peterson MD    Department of Dermatology  [facility]: Phone: [phone], Fax:[phone]  [facility]: Phone: [phone], Fax: [phone]

## 2018-11-02 ENCOUNTER — TELEPHONE (OUTPATIENT)
Dept: PHYSICAL MEDICINE AND REHAB | Facility: CLINIC | Age: 63
End: 2018-11-02

## 2018-11-02 NOTE — TELEPHONE ENCOUNTER
[facility] Call Center    Phone Message    May a detailed message be left on voicemail: yes    Reason for Call: Other: [facility] calling to schedule pt in PMR for dermatitis and pressure ulcers. Not sure if our doctors will see for that but, if so, pts caretaker Butch can be reached at [phone] to schedule.     Action Taken: Message routed to:  [facility] ([facility]

## 2018-11-07 ENCOUNTER — HOSPITAL ENCOUNTER (OUTPATIENT)
Dept: WOUND CARE | Facility: CLINIC | Age: 63
Discharge: HOME | End: 2018-11-07
Attending: PHYSICIAN ASSISTANT | Admitting: PHYSICIAN ASSISTANT
Payer: MEDICARE

## 2018-11-07 VITALS
WEIGHT: 142 LBS | TEMPERATURE: 97.2 F | SYSTOLIC BLOOD PRESSURE: 125 MMHG | HEIGHT: 60 IN | HEART RATE: 80 BPM | BODY MASS INDEX: 27.88 KG/M2 | RESPIRATION RATE: 16 BRPM | DIASTOLIC BLOOD PRESSURE: 77 MMHG

## 2018-11-07 DIAGNOSIS — L89.323 PRESSURE ULCER OF ISCHIUM, LEFT, STAGE III (H): Primary | ICD-10-CM

## 2018-11-07 PROCEDURE — 99213 OFFICE O/P EST LOW 20 MIN: CPT | Performed by: PHYSICIAN ASSISTANT

## 2018-11-07 PROCEDURE — G0463 HOSPITAL OUTPT CLINIC VISIT: HCPCS

## 2018-11-07 PROCEDURE — 97602 WOUND(S) CARE NON-SELECTIVE: CPT

## 2018-11-07 NOTE — PROGRESS NOTES
Patient arrived for wound care visit. Certified Wound Care Nurse time spent evaluating patient record, completed a full evaluation and documented wound(s) & jaja-wound skin; provided recommendation based on treatment plan. Applied dressing, reviewed discharge instructions, patient education, and discussed plan of care with appropriate medical team staff members and patient and/or family members.

## 2018-11-07 NOTE — DISCHARGE INSTRUCTIONS
[facility]  [address] [facility] [address], [place] [address]  Appointment Phone [phone] Nurse Advisors [phone]    Marcus Cevallos      1955    Wound Dressing Change:Left Ischial  Cleanse wound with:soap and water  Cover wound with Critic Aid or rhiannon cream    Change dressing morning and night and with jaja-cares.  Repositioning:    Bed:  Reposition pt MINIMALLY every 1-2 hours in bed during the day and every 2 hours at night to relieve pressure and promote perfusion to tissue.     Chair:  When up to chair pt should not sit for longer than one hour total before either tilting or returning to bed for at least 30 minutes, again to relieve pressure and promote perfusion to tissue.     o Pt should also sit on a chair cushion when up to the chair. Get this pressure mapped at [place].     Angle An PA-C. November 7, 2018    Call us at [phone] if you have any questions about your wounds, have redness or swelling around your wound, have a fever of 101 or greater or if you have any other problems or concerns. We answer the phone Monday through Friday 8 am to 4 pm, please leave a message as we check the voicemail frequently throughout the day.     Follow up with Provider - 3 weeks

## 2018-11-07 NOTE — TELEPHONE ENCOUNTER
Spoke with Butch, patient's caregiver and he stated that patient is being seen at another clinic for dermatitis and pressure ulcers. He does not need a PMR appointment.

## 2018-11-07 NOTE — MR AVS SNAPSHOT
MRN:[id number]                      After Visit Summary   11/7/2018    Marcus Cevallos    MRN: [id number]           Visit Information        Provider Department      11/7/2018  1:45 PM Angle An PA-C [facility]        Your next 10 appointments already scheduled     Jan 03, 2019  2:30 PM CST   Return Visit with Pastora Peterson MD   [facility] ([facility])    [address] [place] [id number] [phone]                Further instructions from your care team             [address] [address]  Appointment Phone [phone] Nurse Advisors [phone]    Marcus Cevallos      1955    Wound Dressing Change:Left Ischial  Cleanse wound with:soap and water  Cover wound with Critic Aid or rhiannon cream    Change dressing morning and night and with jaja-cares.  Repositioning:    Bed:  Reposition pt MINIMALLY every 1-2 hours in bed during the day and every 2 hours at night to relieve pressure and promote perfusion to tissue.     Chair:  When up to chair pt should not sit for longer than one hour total before either tilting or returning to bed for at least 30 minutes, again to relieve pressure and promote perfusion to tissue.     o Pt should also sit on a chair cushion when up to the chair. Get this pressure mapped at [place].     Angle An PA-C. November 7, 2018    Call us at [phone] if you have any questions about your wounds, have redness or swelling around your wound, have a fever of 101 or greater or if you have any other problems or concerns. We answer the phone Monday through Friday 8 am to 4 pm, please leave a message as we check the voicemail frequently throughout the day.     Follow up with Provider - 3 weeks             MyChart Information     [employer] lets you send messages to your doctor, view your test  "results, renew your prescriptions, schedule appointments and more. To sign up, go to [email or web address] . Click on \"Log in\" on the left side of the screen, which will take you to the Welcome page. Then click on \"Sign up Now\" on the right side of the page.     You will be asked to enter the access code listed below, as well as some personal information. Please follow the directions to create your username and password.     Your access code is: [email or web address]  Expires: 2019 12:51 PM     Your access code will  in 90 days. If you need help or a new code, please call your [facility] clinic or [phone].        Care EveryWhere ID     This is your Care EveryWhere ID. This could be used by other organizations to access your [facility] medical records  [id number]        Equal Access to Services     JEY MCCALL : Laquita Mcdermott, sarah celis, simona rutherford, serena canales . So [facility] [phone].    ATENCIÓN: Si habla español, tiene a wells disposición servicios gratuitos de asistencia lingüística. Llame al [phone].    We comply with applicable federal civil rights laws and [place] laws. We do not discriminate on the basis of race, color, national origin, age, disability, sex, sexual orientation, or gender identity.            "

## 2018-11-08 NOTE — PROGRESS NOTES
"[facility]    HISTORY OF PRESENT ILLNESS: Marcus \"Hugh\" WALTER Cevallos is a 63 year old male who presents today, along with his PCA Butch, for a pressure injury over his left IT. Hugh is non-verbal secondary to MR and CP and history is provided by Butch who has been caring for the patient for the past four years. He is non-ambulatory and spends most of his time in his wheelchair. He is being repositioned every 2 hours when at his group home. Attends a day program from about 7am - 3 pm where he gets out of his wheelchair two times to have his brief changed.     WOUND CARE: Desitin paste    OFF[facility]ING[facility] - has not been evaluated in at least 4 years, Group 1 mattress    DATE WOUND ACQUIRED: off and on for at least 4 years    REVIEW OF SYSTEMS:  CONSTITUTIONAL: Denies fevers or acute illness  : Incontinent of urine and stool    PAST MEDICAL HISTORY:  has a past medical history of Cataract; Cerebral palsy (H); CVA (cerebral vascular accident) (H); HTN (hypertension); Hypothyroid; Mental retardation; Obesity, unspecified; Retinal detachment, left; Seizure disorder (H); and Sleep apnea.    SOCIAL HISTORY: lives in group home, PCA Butch     MEDICATIONS:   Current Outpatient Prescriptions   Medication     ACETAMINOPHEN PO     ALBUTEROL SULFATE (2.5 MG/3ML) 0.083% IN NEBU     alendronate (FOSAMAX) 70 MG tablet     Aloe-Sodium Chloride (AYR SALINE NASAL GEL NA)     artificial tears OINT ophthalmic ointment     Ascorbic Acid (VITAMIN C PO)     aspirin 81 MG tablet     bisacodyl (DULCOLAX) 10 MG suppository     CALCIUM CARBONATE 1250 MG/5ML OR SUSP     CARBAMIDE PEROXIDE 6.5 % OT SOLN     cetirizine (ZYRTEC) 5 MG CHEW     chlorhexidine (HIBICLENS) 4 % liquid     DIAZEPAM INTENSOL 5 MG/ML (HIGH CONC) solution     dimethicone (REMEDY NUTRASHIELD) 1 %     econazole nitrate 1 % cream     ergocalciferol (CALCIFEROL) 8000 UNIT/ML drops     Glycopyrrolate (ROBINUL PO)     guaiFENesin 400 " MG TABS     hydrocortisone 2.5 % ointment     LACTULOSE PO     levETIRAcetam (KEPPRA) 100 MG/ML solution     levofloxacin (LEVAQUIN) 750 MG tablet     LEVOTHYROXINE SODIUM 50 MCG OR TABS     loratadine (CLARITIN) 10 MG tablet     Multiple Vitamins-Minerals (CERTAVITE SENIOR/ANTIOXIDANT PO)     mupirocin (BACTROBAN) 2 % ointment     NEW MED     nystatin (MYCOSTATIN) ointment     NYSTATIN EX     order for DME     order for DME     PATADAY 0.2 % OP SOLN     perampanel (FYCOMPA) 6 MG tablet     Polyethylene Glycol 3350 (MIRALAX PO)     polyvinyl alcohol (ARTIFICIAL TEARS) 1.4 % ophthalmic solution     PROCTOSOL HC 2.5 % RE CREA     psyllium (METAMUCIL) 58.6 % POWD     REPLETE/FIBER OR LIQD     triamcinolone (KENALOG) 0.1 % ointment     valproic acid (DEPAKENE) 250 MG/5ML SOLN syrup     No current facility-administered medications for this encounter.        VITALS: /77  Pulse 80  Temp 97.2  F (36.2  C) (Temporal)  Resp 16  Ht 1.524 m (5')  Wt 64.4 kg (142 lb)  BMI 27.73 kg/m2    PHYSICAL EXAM:  GENERAL: Patient is alert and oriented and in no acute distress  INTEGUMENTARY:   WOUND ASSESSMENT #1:     Location: left IT     Size: 2.5 cm x 1.8 cm with a depth of 0.1 cm    Drainage: copious amount of serosanguinous drainage    Wound description: shallow granular        PROCEDURE: Topical 4% lidocaine was applied to the wound. The wound was mechanically debrided.       ASSESSMENT: stage 3 pressure ulcer of left IT in the setting of incontinence    PLAN:   1. Continue barrier cream  2. Reposition every hour while seated  3. Reposition ever two hours while in bed  4. Pressure map current wheelchair  5. Can continue to use TAC ointment for irritant dermatitis but recommended avoiding the wound    FOLLOW-UP: 3 weeks    CHARITY YOUSIF PA-C ([facility])

## 2018-11-14 NOTE — ADDENDUM NOTE
Encounter addended by: Angle An PA-C on: 11/14/2018  9:03 AM<BR>     Actions taken: Sign clinical note

## 2018-12-17 ENCOUNTER — MEDICAL CORRESPONDENCE (OUTPATIENT)
Dept: HEALTH INFORMATION MANAGEMENT | Facility: CLINIC | Age: 63
End: 2018-12-17

## 2018-12-17 ENCOUNTER — TRANSFERRED RECORDS (OUTPATIENT)
Dept: HEALTH INFORMATION MANAGEMENT | Facility: CLINIC | Age: 63
End: 2018-12-17

## 2018-12-18 ENCOUNTER — MEDICAL CORRESPONDENCE (OUTPATIENT)
Dept: HEALTH INFORMATION MANAGEMENT | Facility: CLINIC | Age: 63
End: 2018-12-18

## 2019-01-29 NOTE — MR AVS SNAPSHOT
After Visit Summary   9/21/2018    Marcus Cevallos    MRN: [id number]           Patient Information     Date Of Birth          1955        Visit Information        Provider Department      9/21/2018 3:45 PM Pastora Peterson MD [facility]        Today's Diagnoses     Rash    -  1      Care Instructions    Start Ketaconazole with Desitin over to buttocks and hold Hydrocortisone.     Please have guardian accompany at next appointment or have available via phone in case of biopsy.     Please find out more of history of area and bring to next appointment.           Follow-ups after your visit        Your next 10 appointments already scheduled     Oct 19, 2018  3:30 PM CDT   Return Visit with Sona Kapoor MD   [facility] ([facility])    [address] [place] [id number] [phone]            Nov 01, 2018  1:15 PM CDT   Return Visit with Pastora Peterson MD   [facility] ([facility])    [address] [place] [id number] [phone]            Nov 05, 2018  2:00 PM CST   New Visit with Anthony Summers OD   [facility] ([facility])    [address] [facility] [address] [phone]              Who to contact     If you have questions or need follow up information about today's clinic visit or your schedule please contact [facility] directly at [phone].  Normal or non-critical lab and imaging results will be communicated to you by MyChart, letter or phone within 4 business days after the clinic has received the results. If you do not hear from us within 7 days, please contact the clinic through MyChart or phone. If you have a critical or abnormal lab result, we will notify you by phone as soon as possible.  Submit refill requests through [employer] or call your pharmacy and they will forward the refill request to  us. Please allow 3 business days for your refill to be completed.          Additional Information About Your Visit        [employer] Information     [employer] is an electronic gateway that provides easy, online access to your medical records. With [employer], you can request a clinic appointment, read your test results, renew a prescription or communicate with your care team.     To sign up for [employer] visit the website at [email or web address]   You will be asked to enter the access code listed below, as well as some personal information. Please follow the directions to create your username and password.     Your access code is: [email or web address]  Expires: 10/8/2018  4:42 PM     Your access code will  in 90 days. If you need help or a new code, please contact your [facility] or call [phone] for assistance.        Care EveryWhere ID     This is your Care EveryWhere ID. This could be used by other organizations to access your [place] medical records  [id number]         Blood Pressure from Last 3 Encounters:   18 112/70   17 109/43   17 137/72    Weight from Last 3 Encounters:   17 68 kg (149 lb 14.4 oz)   17 67.8 kg (149 lb 8 oz)   16 67.9 kg (149 lb 9.6 oz)              We Performed the Following     Skin Culture Aerobic Bacterial        Primary Care Provider Office Phone # Fax #    Maria Eugenia Sarah Dowell [phone]       [facility] PHYSICIAN [facility] [address]  [facility] [address]        Equal Access to Services     [facility]: Hadii aad ku hadasho Soomaali, waaxda luqadaha, qaybta kaalmada adeegyada, serena canales . So [facility] [phone].    ATENCIÓN: Si habla español, tiene a wells disposición servicios gratuitos de asistencia lingüística. Llame al [phone].    We comply with applicable federal civil rights laws and [place] laws. We do not discriminate on the basis of race,  color, national origin, age, disability, sex, sexual orientation, or gender identity.            Thank you!     Thank you for choosing [facility]  for your care. Our goal is always to provide you with excellent care. Hearing back from our patients is one way we can continue to improve our services. Please take a few minutes to complete the written survey that you may receive in the mail after your visit with us. Thank you!             Your Updated Medication List - Protect others around you: Learn how to safely use, store and throw away your medicines at www.disposemymeds.org.          This list is accurate as of 9/21/18  4:39 PM.  Always use your most recent med list.                   Brand Name Dispense Instructions for use Diagnosis    ACETAMINOPHEN PO      325 mg by Oral or G tube route as needed for pain        albuterol (2.5 MG/3ML) 0.083% neb solution      as needed        alendronate 70 MG tablet    FOSAMAX    13 tablet    Take 1 tablet every 7 days via G tube. Take with water and without food or other medication for at least 30 minutes.    Osteoporosis       artificial tears Oint ophthalmic ointment      Place into both eyes 3 times daily        aspirin 81 MG tablet      Take 1 tablet by mouth daily. (*).        AYR SALINE NASAL GEL NA           benzoyl peroxide 5 % Liqd     140 g    Use as an anti-microbial wash in the tub every 2 days.    Irritant dermatitis       bisacodyl 10 MG Suppository    DULCOLAX     Place 10 mg rectally daily as needed for constipation        CALCIFEROL 8000 UNIT/ML drops   Generic drug:  ergocalciferol     30 mL    12 drops per g tube twice daily        calcium carbonate 1250 (500 Ca) MG/5ML Susp suspension     90 days    1 teaspoon down G tube daily    Cerebral palsy (H)       carbamide peroxide 6.5 % otic solution    DEBROX     as needed        CERTAVITE SENIOR/ANTIOXIDANT PO           cetirizine 5 MG Chew    zyrTEC     Take 5 mg by mouth daily         DIAZEPAM INTENSOL 5 MG/ML (HIGH CONC) solution   Generic drug:  diazepam     30 mL    TAKE 1ML (5MG) BUCCALLY AS NEEDED FOR CONVULSIVE SEIZURE >1 MINUTE, 3 MINUTES OF A NON-CONVULSIVE SEIZURE OR 2ND NON-CONVULSIVE SEIZURE (WIT    Intractable generalized tonic-clonic epilepsy (H)       econazole nitrate 1 % cream     30 g    Apply 3x weekly to the groin. Okay to use twice daily with flares.    Intertrigo       guaiFENesin 400 MG Tabs      Per packet instructions as needed        hydrocortisone 2.5 % ointment     454 g    Start twice daily for 4 weeks to red areas    Irritant dermatitis       LACTULOSE PO      Take by mouth daily        levETIRAcetam 100 MG/ML solution    KEPPRA    775 mL    GIVE 12.5ML (1250MG) PER G-TUBE TWICE DAILY    Seizure disorder (H)       levofloxacin 750 MG tablet    LEVAQUIN     Take by mouth daily 1 tab for 5 days - D/C 1.27.17        levothyroxine 50 MCG tablet    SYNTHROID/LEVOTHROID     1 TABLET DAILY        loratadine 10 MG tablet    CLARITIN     Take 10 mg by mouth daily        MIRALAX PO      Take 8.5 mg by mouth daily        mupirocin 2 % ointment    BACTROBAN    66 g    APPLY TO AFFECTED AREA(S) OF BUTTOCKS THREE TIMES DAILY *1 TOTAL FILL*    Impetigo, Healing decubitus ulcer, unspecified ulcer stage       NEW MED     4 Container    Triple paste 40oz,  Use after brief changes groin and buttocks area    Impetigo, Candidal intertrigo       * NYSTATIN EX      Externally apply topically as needed        * nystatin ointment    MYCOSTATIN    120 g    Apply twice daily to the scrotum and left groin    Candidal intertrigo       order for DME     1 each    Equipment being ordered: Wheel Chair Cushion    Skin erosion       PATADAY 0.2 % Soln   Generic drug:  olopatadine HCl      None Entered        perampanel 6 MG tablet    FYCOMPA    31 tablet    TAKE 1 TABLET PER G-TUBE AT BEDTIME (SIGN NARC BOOK) **6 TOTAL FILLS*    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    Keratitis sicca, bilateral       PROCTOSOL HC 2.5 % cream   Generic drug:  hydrocortisone      as needed        REMEDY NUTRASHIELD 1 %   Generic drug:  dimethicone           REPLETE/FIBER Liqd      1 250cc CAN PER GT 4X/DAY WITH 325CC WATER 4X/DAY=5.5 cans total    Obesity       ROBINUL PO           valproic acid 250 MG/5ML Soln syrup    DEPAKENE    620 mL    TAKE 10ML (500MG) PER G-TUBE TWICE DAILY.    Seizure disorder (H)       VITAMIN C PO      Take 500 mg by mouth daily        * Notice:  This list has 2 medication(s) that are the same as other medications prescribed for you. Read the directions carefully, and ask your doctor or other care provider to review them with you.       dec, 12, 2018

## 2019-02-17 DIAGNOSIS — G40.419 INTRACTABLE GENERALIZED TONIC-CLONIC EPILEPSY (H): ICD-10-CM

## 2019-02-17 RX ORDER — DIAZEPAM ORAL SOLUTION (CONCENTRATE) 5 MG/ML
SOLUTION ORAL
Qty: 30 ML | Refills: 4 | Status: CANCELLED | OUTPATIENT
Start: 2019-02-17

## 2019-02-18 DIAGNOSIS — G40.419 INTRACTABLE GENERALIZED TONIC-CLONIC EPILEPSY (H): Primary | ICD-10-CM

## 2019-02-18 RX ORDER — DIAZEPAM ORAL SOLUTION (CONCENTRATE) 5 MG/ML
SOLUTION ORAL
Qty: 30 ML | Refills: 1 | Status: CANCELLED | OUTPATIENT
Start: 2019-02-18

## 2019-02-19 DIAGNOSIS — G40.419 INTRACTABLE GENERALIZED TONIC-CLONIC EPILEPSY (H): ICD-10-CM

## 2019-02-19 RX ORDER — DIAZEPAM ORAL SOLUTION (CONCENTRATE) 5 MG/ML
SOLUTION ORAL
Qty: 30 ML | Refills: 0 | Status: SHIPPED | OUTPATIENT
Start: 2019-02-19 | End: 2019-02-28

## 2019-02-28 DIAGNOSIS — G40.419 INTRACTABLE GENERALIZED TONIC-CLONIC EPILEPSY (H): ICD-10-CM

## 2019-02-28 RX ORDER — DIAZEPAM ORAL SOLUTION (CONCENTRATE) 5 MG/ML
SOLUTION ORAL
Qty: 30 ML | Refills: 0 | Status: SHIPPED | OUTPATIENT
Start: 2019-02-28 | End: 2019-03-26

## 2019-03-26 ENCOUNTER — OFFICE VISIT (OUTPATIENT)
Dept: NEUROLOGY | Facility: CLINIC | Age: 64
End: 2019-03-26
Payer: MEDICARE

## 2019-03-26 VITALS — TEMPERATURE: 97.5 F | DIASTOLIC BLOOD PRESSURE: 76 MMHG | HEART RATE: 53 BPM | SYSTOLIC BLOOD PRESSURE: 138 MMHG

## 2019-03-26 DIAGNOSIS — G40.319 GENERALIZED CONVULSIVE EPILEPSY WITH INTRACTABLE EPILEPSY (H): ICD-10-CM

## 2019-03-26 DIAGNOSIS — G40.419 INTRACTABLE GENERALIZED TONIC-CLONIC EPILEPSY (H): ICD-10-CM

## 2019-03-26 DIAGNOSIS — G40.909 SEIZURE DISORDER (H): ICD-10-CM

## 2019-03-26 RX ORDER — DIAZEPAM ORAL SOLUTION (CONCENTRATE) 5 MG/ML
SOLUTION ORAL
Qty: 30 ML | Refills: 0 | Status: SHIPPED | OUTPATIENT
Start: 2019-03-26 | End: 2020-01-20

## 2019-03-26 RX ORDER — LEVETIRACETAM 100 MG/ML
SOLUTION ORAL
Qty: 775 ML | Refills: 11 | Status: SHIPPED | OUTPATIENT
Start: 2019-03-26 | End: 2020-02-28

## 2019-03-26 ASSESSMENT — PAIN SCALES - GENERAL: PAINLEVEL: NO PAIN (0)

## 2019-03-26 NOTE — PROGRESS NOTES
INTERVAL HX: Now on 6 mg perampanel HS and doing very well. Now only approximately 0-3 sz per mo; no increase in sedation. Rare use of rescue meds; none last few months.    SEIZURE HISTORY REVIEWED 3/26/19:  Marcus is a man, who has been a [facility] patient for many years.   He is in a group home.  He is having his baseline number of seizures overall; but the group home feels that, if anything, he has improved somewhat.  They have fairly extensive records of seizures.  He was having nonconvulsive seizures, approximately 15-20 per month.  These are very brief jerks.  He also has occasional generalized convulsive seizures, but these actually have not occurred for some years.  He had his 1st seizure at age 7, but this is on a baseline of very severe issues.  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  He has a G tube in place for many years because of frequent aspiration pneumonias.  He had a stroke in the 1980s and subsequently has been unable to speak.  He has also had hemorrhoids, left ankle fracture and hip surgery.      FAMILY HISTORY:  Includes a sister, who has tuberous sclerosis.        He has been reliant on Diazepam Intensol through the G tube for clusters of seizures.  The diazepam is very helpful, and he has had no emergency room visits in the last years.     SOCIAL HISTORY:  He has been residing in a group home now for many years and appears to be well cared for.      REVIEW OF SYSTEMS:  Through the group home.     He has had no fevers, no pneumonias, no cardiac issues, no issues with blood pressure, no significant issues with  other than needs to be fed through a gastric tube.   :  Is incontinent.     MUSCULOSKELETAL:  No major issues noted.      PHYSICAL EXAMINATION:  Blood pressure 138/76, pulse 53, temperature 97.5  F (36.4  C).     He is completely  wheelchair-bound.  He has a small body size with foreshortened upper and lower extremities consistent with early childhood cerebral palsy.  He has a feeding tube in place.      Attention Span:  Nonresponsive verbally, minimally responsive to strong stimuli.  In wheelchair, he does have roving eye movements and not clear whether he is actually fixating.      Mild spasticity of upper and lower extremities.      ASSESSMENT:  He was  deteriorating.    Adding perampanel has resulted in a significant reduction in sz.  Now is stable   PLAN:     1.  Continue  perampanel 6 mg hs, LEV 12.5 ml (1250 mg) bid; VPA10 ml (500mg) bid.  2. RTC 1 year

## 2019-03-26 NOTE — LETTER
3/26/2019       RE: Marcus Cevallos  : 1955   MRN: [id number]      Dear Colleague,    Thank you for referring your patient, Marcus Cevallos, to the [facility] at [facility]. Please see a copy of my visit note below.    INTERVAL HX: Now on 6 mg perampanel HS and doing very well. Now only approximately 0-3 sz per mo; no increase in sedation. Rare use of rescue meds; none last few months.    SEIZURE HISTORY REVIEWED 3/26/19:  Marcus is a man, who has been a [facility] patient for many years.   He is in a group home.  He is having his baseline number of seizures overall; but the group home feels that, if anything, he has improved somewhat.  They have fairly extensive records of seizures.  He was having nonconvulsive seizures, approximately 15-20 per month.  These are very brief jerks.  He also has occasional generalized convulsive seizures, but these actually have not occurred for some years.  He had his 1st seizure at age 7, but this is on a baseline of very severe issues.  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  He has a G tube in place for many years because of frequent aspiration pneumonias.  He had a stroke in the 1980s and subsequently has been unable to speak.  He has also had hemorrhoids, left ankle fracture and hip surgery.      FAMILY HISTORY:  Includes a sister, who has tuberous sclerosis.        He has been reliant on Diazepam Intensol through the G tube for clusters of seizures.  The diazepam is very helpful, and he has had no emergency room visits in the last years.     SOCIAL HISTORY:  He has been residing in a group home now for many years and appears to be well cared for.      REVIEW OF SYSTEMS:  Through the group home.     He has had no fevers, no pneumonias, no cardiac issues, no issues  with blood pressure, no significant issues with  other than needs to be fed through a gastric tube.   :  Is incontinent.     MUSCULOSKELETAL:  No major issues noted.      PHYSICAL EXAMINATION:  Blood pressure 138/76, pulse 53, temperature 97.5  F (36.4  C).     He is completely wheelchair-bound.  He has a small body size with foreshortened upper and lower extremities consistent with early childhood cerebral palsy.  He has a feeding tube in place.      Attention Span:  Nonresponsive verbally, minimally responsive to strong stimuli.  In wheelchair, he does have roving eye movements and not clear whether he is actually fixating.      Mild spasticity of upper and lower extremities.      ASSESSMENT:  He was  deteriorating.    Adding perampanel has resulted in a significant reduction in sz.  Now is stable   PLAN:     1.  Continue  perampanel 6 mg hs, LEV 12.5 ml (1250 mg) bid; VPA10 ml (500mg) bid.  2. RTC 1 year    Again, thank you for allowing me to participate in the care of your patient.      Sincerely,    Nanda De La Torre MD

## 2019-05-24 ENCOUNTER — OFFICE VISIT (OUTPATIENT)
Dept: ENDOCRINOLOGY | Facility: CLINIC | Age: 64
End: 2019-05-24
Payer: MEDICARE

## 2019-05-24 VITALS — DIASTOLIC BLOOD PRESSURE: 79 MMHG | OXYGEN SATURATION: 95 % | HEART RATE: 66 BPM | SYSTOLIC BLOOD PRESSURE: 133 MMHG

## 2019-05-24 DIAGNOSIS — E03.9 HYPOTHYROIDISM, UNSPECIFIED TYPE: ICD-10-CM

## 2019-05-24 DIAGNOSIS — M81.0 SENILE OSTEOPOROSIS: Primary | ICD-10-CM

## 2019-05-24 DIAGNOSIS — E55.9 VITAMIN D DEFICIENCY DISEASE: ICD-10-CM

## 2019-05-24 LAB
ALBUMIN SERPL-MCNC: 3.4 G/DL (ref 3.4–5)
ALP SERPL-CCNC: 232 U/L (ref 40–150)
ANION GAP SERPL CALCULATED.3IONS-SCNC: 7 MMOL/L (ref 3–14)
BASOPHILS # BLD AUTO: 0.1 10E9/L (ref 0–0.2)
BASOPHILS NFR BLD AUTO: 0.7 %
BUN SERPL-MCNC: 22 MG/DL (ref 7–30)
CA-I SERPL ISE-MCNC: 4.5 MG/DL (ref 4.4–5.2)
CALCIUM SERPL-MCNC: 9.2 MG/DL (ref 8.5–10.1)
CHLORIDE SERPL-SCNC: 97 MMOL/L (ref 94–109)
CO2 SERPL-SCNC: 28 MMOL/L (ref 20–32)
CREAT SERPL-MCNC: 0.29 MG/DL (ref 0.66–1.25)
DIFFERENTIAL METHOD BLD: ABNORMAL
EOSINOPHIL # BLD AUTO: 0.5 10E9/L (ref 0–0.7)
EOSINOPHIL NFR BLD AUTO: 6.8 %
ERYTHROCYTE [DISTWIDTH] IN BLOOD BY AUTOMATED COUNT: 15.2 % (ref 10–15)
GFR SERPL CREATININE-BSD FRML MDRD: >90 ML/MIN/{1.73_M2}
GLUCOSE SERPL-MCNC: 96 MG/DL (ref 70–99)
HCT VFR BLD AUTO: 43.2 % (ref 40–53)
HGB BLD-MCNC: 14.7 G/DL (ref 13.3–17.7)
IMM GRANULOCYTES # BLD: 0 10E9/L (ref 0–0.4)
IMM GRANULOCYTES NFR BLD: 0.4 %
LYMPHOCYTES # BLD AUTO: 1.1 10E9/L (ref 0.8–5.3)
LYMPHOCYTES NFR BLD AUTO: 16.4 %
MCH RBC QN AUTO: 31.1 PG (ref 26.5–33)
MCHC RBC AUTO-ENTMCNC: 34 G/DL (ref 31.5–36.5)
MCV RBC AUTO: 92 FL (ref 78–100)
MONOCYTES # BLD AUTO: 0.6 10E9/L (ref 0–1.3)
MONOCYTES NFR BLD AUTO: 8.2 %
NEUTROPHILS # BLD AUTO: 4.6 10E9/L (ref 1.6–8.3)
NEUTROPHILS NFR BLD AUTO: 67.5 %
PHOSPHATE SERPL-MCNC: 4 MG/DL (ref 2.5–4.5)
PLATELET # BLD AUTO: 318 10E9/L (ref 150–450)
POTASSIUM SERPL-SCNC: 3.9 MMOL/L (ref 3.4–5.3)
PTH-INTACT SERPL-MCNC: 88 PG/ML (ref 18–80)
RBC # BLD AUTO: 4.72 10E12/L (ref 4.4–5.9)
SODIUM SERPL-SCNC: 132 MMOL/L (ref 133–144)
TSH SERPL DL<=0.005 MIU/L-ACNC: 3.9 MU/L (ref 0.4–4)
WBC # BLD AUTO: 6.8 10E9/L (ref 4–11)

## 2019-05-24 PROCEDURE — 82330 ASSAY OF CALCIUM: CPT | Performed by: INTERNAL MEDICINE

## 2019-05-24 PROCEDURE — 82306 VITAMIN D 25 HYDROXY: CPT | Performed by: INTERNAL MEDICINE

## 2019-05-24 PROCEDURE — 84080 ASSAY ALKALINE PHOSPHATASES: CPT | Mod: 90 | Performed by: INTERNAL MEDICINE

## 2019-05-24 PROCEDURE — 84443 ASSAY THYROID STIM HORMONE: CPT | Performed by: INTERNAL MEDICINE

## 2019-05-24 PROCEDURE — 99000 SPECIMEN HANDLING OFFICE-LAB: CPT | Performed by: INTERNAL MEDICINE

## 2019-05-24 PROCEDURE — 99204 OFFICE O/P NEW MOD 45 MIN: CPT | Performed by: INTERNAL MEDICINE

## 2019-05-24 PROCEDURE — 85025 COMPLETE CBC W/AUTO DIFF WBC: CPT | Performed by: INTERNAL MEDICINE

## 2019-05-24 PROCEDURE — 36415 COLL VENOUS BLD VENIPUNCTURE: CPT | Performed by: INTERNAL MEDICINE

## 2019-05-24 PROCEDURE — 84075 ASSAY ALKALINE PHOSPHATASE: CPT | Performed by: INTERNAL MEDICINE

## 2019-05-24 PROCEDURE — 80069 RENAL FUNCTION PANEL: CPT | Performed by: INTERNAL MEDICINE

## 2019-05-24 PROCEDURE — 83970 ASSAY OF PARATHORMONE: CPT | Performed by: INTERNAL MEDICINE

## 2019-05-24 NOTE — LETTER
Patient:  Marcus Cevallos  :   1955  MRN:     [id number]        Mr.William WALTER Cevallos  [address] [id number]        May 29, 2019    Dear ,    We are writing to inform you of your test results.Vit D levels are slightly higher than upper limit, we will plan to repeat labs in future. Parathyroid hormones were also noted to be higher than normal, prior labs were normal. I recommend repeating this lab test in 3 months nearest [facility]. The labs are already ordered.     Mild low sodium and high alkaline phosphatase were noted. Please follow-up with PCP regarding this to assess if more work up is needed.     Best regards,   Shree Cuevas MD  [address]  Endocrinology Service        Resulted Orders   25 Hydroxyvitamin D2 and D3   Result Value Ref Range    25 OH Vit D2 64 ug/L    25 OH Vit D3 15 ug/L    25 OH Vit D total 79 (H) 20 - 75 ug/L      Comment:      Season, race, dietary intake, and treatment affect the concentration of   25-hydroxy-Vitamin D. Values may decrease during winter months and increase   during summer months. Values 20-29 ug/L may indicate Vitamin D insufficiency   and values <20 ug/L may indicate Vitamin D deficiency.  This test was developed and its performance characteristics determined by the   [facility],  Special Chemistry Laboratory. It has   not been cleared or approved by the FDA. The laboratory is regulated under   CLIA as qualified to perform high-complexity testing. This test is used for   clinical purposes. It should not be regarded as investigational or for   research.     CBC with platelets differential   Result Value Ref Range    WBC 6.8 4.0 - 11.0 10e9/L    RBC Count 4.72 4.4 - 5.9 10e12/L    Hemoglobin 14.7 13.3 - 17.7 g/dL    Hematocrit 43.2 40.0 - 53.0 %    MCV 92 78 - 100 fl    MCH 31.1 26.5 - 33.0 pg    MCHC 34.0 31.5 - 36.5 g/dL    RDW 15.2 (H) 10.0 - 15.0 %    Platelet Count 318 150 - 450 10e9/L     Diff Method Automated Method     % Neutrophils 67.5 %    % Lymphocytes 16.4 %    % Monocytes 8.2 %    % Eosinophils 6.8 %    % Basophils 0.7 %    % Immature Granulocytes 0.4 %    Absolute Neutrophil 4.6 1.6 - 8.3 10e9/L    Absolute Lymphocytes 1.1 0.8 - 5.3 10e9/L    Absolute Monocytes 0.6 0.0 - 1.3 10e9/L    Absolute Eosinophils 0.5 0.0 - 0.7 10e9/L    Absolute Basophils 0.1 0.0 - 0.2 10e9/L    Abs Immature Granulocytes 0.0 0 - 0.4 10e9/L   TSH with free T4 reflex   Result Value Ref Range    TSH 3.90 0.40 - 4.00 mU/L   Calcium ionized   Result Value Ref Range    Calcium Ionized 4.5 4.4 - 5.2 mg/dL   Bone specific alk phosphatase   Result Value Ref Range    Bone Spec Alk Phosphatase 17.6 6.5 - 20.1 ug/L      Comment:      (Note)  INTERPRETIVE INFORMATION: Bone Specific Alkaline Phosphatase  Liver alkaline phosphatase can affect the measurement of   bone specific alkaline phosphatase in this assay. Each 100   U/L of liver alkaline phosphatase contributes an additional   2.5 to 5.8 ug/L to the bone specific alkaline phosphatase   result.  Performed by [employer]  [address] [place] [address] [phone]  [email or web address], Eugene Jones MD, Lab. Director     Alkaline phosphatase   Result Value Ref Range    Alkaline Phosphatase 232 (H) 40 - 150 U/L   Parathyroid Hormone Intact   Result Value Ref Range    Parathyroid Hormone Intact 88 (H) 18 - 80 pg/mL   Renal panel   Result Value Ref Range    Sodium 132 (L) 133 - 144 mmol/L    Potassium 3.9 3.4 - 5.3 mmol/L    Chloride 97 94 - 109 mmol/L    Carbon Dioxide 28 20 - 32 mmol/L    Anion Gap 7 3 - 14 mmol/L    Glucose 96 70 - 99 mg/dL    Urea Nitrogen 22 7 - 30 mg/dL    Creatinine 0.29 (L) 0.66 - 1.25 mg/dL    GFR Estimate >90 >60 mL/min/[1.73_m2]      Comment:      Non  GFR Calc  Starting 12/18/2018, serum creatinine based estimated GFR (eGFR) will be   calculated using the Chronic Kidney Disease Epidemiology Collaboration   (CKD-EPI) equation.       GFR Estimate If Black >90 >60 mL/min/[1.73_m2]      Comment:       GFR Calc  Starting 12/18/2018, serum creatinine based estimated GFR (eGFR) will be   calculated using the Chronic Kidney Disease Epidemiology Collaboration   (CKD-EPI) equation.      Calcium 9.2 8.5 - 10.1 mg/dL    Phosphorus 4.0 2.5 - 4.5 mg/dL    Albumin 3.4 3.4 - 5.0 g/dL       Shree Cuevas MD

## 2019-05-24 NOTE — LETTER
5/24/2019         RE: Marcus Cevallos  [address] [facility] [address]        Dear Colleague,    Thank you for referring your patient, Marcus Cevallos, to the [facility]. Please see a copy of my visit note below.    Endocrine Clinic Consult:     Reason for consult:  Date: 05/24/2019  Referring Physician: Maria Eugenia Rogers      HPI:     Marcus Cevallos is a 64 year old male who presents for evaluation of  Osteoporosis.     Initially seen by Dr Sears / Dr Villanueva.   -- treatment for osteoporosis started in 6/6/2014  -- Taking alendronate liquid from G-tube since then.   -- taking Vit D regularly.   -- Ca Co3 from G tube.       Risk Factors:    Wheelchair bound due to cerebral palsy.   Nutrition from G tube.   One prior fracture from accidental fall several years ago.         Symptoms Present? Details   Age // BMI // Race  CM   Increased fall risk  No, wheel chair bound.    Prior Fracture  Yes   Parental hip fracture  unknown   Secondary causes  Yes, nonambulatory    Steroid use  No    Smoking  No    Hypogonadism  unknown   Rheumatoid arthritis  No    Alcohol > 3 drinks a day  No    Femoral BMD  invalid   Sedentary life style  yes   Ca supplement  yes   Vit D supplement  yes            Risk  Present ?    FH of hyper para / MEN / hyper calcemia.  unknown   Sarcoid ?  No    Prolonged immobilization yes   Thiazide No    Vit A No    Thyroid disease / meds On meds.      Other ROS:   At baseline per caregiver.   Grinds teeth or grimaces to show displeasure.   Does not understand language, does not follow up command.   Hoyerlift for transfers.       Past Medical History:   Diagnosis Date     Cataract      Cerebral palsy (H)      CVA (cerebral vascular accident) (H)      HTN (hypertension)      Hypothyroid      Mental retardation      Obesity, unspecified      Retinal detachment, left      Seizure disorder (H)      Sleep apnea      No past surgical history on  file.  Family History   Problem Relation Age of Onset     Unknown/Adopted Mother      Hypertension Father      Social History     Socioeconomic History     Marital status: Single     Spouse name: Not on file     Number of children: Not on file     Years of education: Not on file     Highest education level: Not on file   Occupational History     Not on file   Social Needs     Financial resource strain: Not on file     Food insecurity:     Worry: Not on file     Inability: Not on file     Transportation needs:     Medical: Not on file     Non-medical: Not on file   Tobacco Use     Smoking status: Never Smoker     Smokeless tobacco: Never Used   Substance and Sexual Activity     Alcohol use: No     Drug use: No     Sexual activity: Not Currently   Lifestyle     Physical activity:     Days per week: Not on file     Minutes per session: Not on file     Stress: Not on file   Relationships     Social connections:     Talks on phone: Not on file     Gets together: Not on file     Attends Scientologist service: Not on file     Active member of club or organization: Not on file     Attends meetings of clubs or organizations: Not on file     Relationship status: Not on file     Intimate partner violence:     Fear of current or ex partner: Not on file     Emotionally abused: Not on file     Physically abused: Not on file     Forced sexual activity: Not on file   Other Topics Concern     Parent/sibling w/ CABG, MI or angioplasty before 65F 55M? No   Social History Narrative     Not on file        Allergies   Allergen Reactions     No Known Allergies      Current Outpatient Medications   Medication     ACETAMINOPHEN PO     ALBUTEROL SULFATE (2.5 MG/3ML) 0.083% IN NEBU     alendronate (FOSAMAX) 70 MG tablet     Aloe-Sodium Chloride (AYR SALINE NASAL GEL NA)     artificial tears OINT ophthalmic ointment     Ascorbic Acid (VITAMIN C PO)     aspirin 81 MG tablet     bisacodyl (DULCOLAX) 10 MG suppository     CALCIUM CARBONATE 1250  MG/5ML OR SUSP     CARBAMIDE PEROXIDE 6.5 % OT SOLN     cetirizine (ZYRTEC) 5 MG CHEW     chlorhexidine (HIBICLENS) 4 % liquid     diazepam (VALIUM) 5 MG/ML (HIGH CONC) solution     dimethicone (REMEDY NUTRASHIELD) 1 %     econazole nitrate 1 % cream     ergocalciferol (CALCIFEROL) 8000 UNIT/ML drops     Glycopyrrolate (ROBINUL PO)     guaiFENesin 400 MG TABS     hydrocortisone 2.5 % ointment     LACTULOSE PO     levETIRAcetam (KEPPRA) 100 MG/ML solution     levofloxacin (LEVAQUIN) 750 MG tablet     LEVOTHYROXINE SODIUM 50 MCG OR TABS     loratadine (CLARITIN) 10 MG tablet     Multiple Vitamins-Minerals (CERTAVITE SENIOR/ANTIOXIDANT PO)     NEW MED     order for DME     order for DME     PATADAY 0.2 % OP SOLN     perampanel (FYCOMPA) 6 MG tablet     Polyethylene Glycol 3350 (MIRALAX PO)     polyvinyl alcohol (ARTIFICIAL TEARS) 1.4 % ophthalmic solution     PROCTOSOL HC 2.5 % RE CREA     psyllium (METAMUCIL) 58.6 % POWD     REPLETE/FIBER OR LIQD     sennosides (SENOKOT) 8.8 MG/5ML syrup     valproic acid (DEPAKENE) 250 MG/5ML syrup     No current facility-administered medications for this visit.            Exam:  /79 (BP Location: Left arm, Patient Position: Sitting, Cuff Size: Child)   Pulse 66   SpO2 95%    Constitutional: obese male in wheelchair, no distress.   Head: Teeth grinding, moving head   Neck: Neck supple. No adenopathy. Thyroid no goiter  ENT: No throat congestion, no neck nodes or sinus tenderness  Cardiovascular: regular rhythm, no tachycardia  Respiratory: Lungs clear  Gastrointestinal: Abdomen soft, non-tender. BS normal. No striae  Musculoskeletal: gait normal and normal muscle tone  Neurologic: Normal speech, reflexes normal.   Psychiatric: mentation appears normal   Spine not examined.   Extremity: contractures of both hand.     Last Basic Metabolic Panel:    Results for orders placed or performed in visit on 05/24/19   CBC with platelets differential   Result Value Ref Range    WBC 6.8  4.0 - 11.0 10e9/L    RBC Count 4.72 4.4 - 5.9 10e12/L    Hemoglobin 14.7 13.3 - 17.7 g/dL    Hematocrit 43.2 40.0 - 53.0 %    MCV 92 78 - 100 fl    MCH 31.1 26.5 - 33.0 pg    MCHC 34.0 31.5 - 36.5 g/dL    RDW 15.2 (H) 10.0 - 15.0 %    Platelet Count 318 150 - 450 10e9/L    Diff Method Automated Method     % Neutrophils 67.5 %    % Lymphocytes 16.4 %    % Monocytes 8.2 %    % Eosinophils 6.8 %    % Basophils 0.7 %    % Immature Granulocytes 0.4 %    Absolute Neutrophil 4.6 1.6 - 8.3 10e9/L    Absolute Lymphocytes 1.1 0.8 - 5.3 10e9/L    Absolute Monocytes 0.6 0.0 - 1.3 10e9/L    Absolute Eosinophils 0.5 0.0 - 0.7 10e9/L    Absolute Basophils 0.1 0.0 - 0.2 10e9/L    Abs Immature Granulocytes 0.0 0 - 0.4 10e9/L   TSH with free T4 reflex   Result Value Ref Range    TSH 3.90 0.40 - 4.00 mU/L   Alkaline phosphatase   Result Value Ref Range    Alkaline Phosphatase 232 (H) 40 - 150 U/L   Renal panel   Result Value Ref Range    Sodium 132 (L) 133 - 144 mmol/L    Potassium 3.9 3.4 - 5.3 mmol/L    Chloride 97 94 - 109 mmol/L    Carbon Dioxide 28 20 - 32 mmol/L    Anion Gap 7 3 - 14 mmol/L    Glucose 96 70 - 99 mg/dL    Urea Nitrogen 22 7 - 30 mg/dL    Creatinine 0.29 (L) 0.66 - 1.25 mg/dL    GFR Estimate >90 >60 mL/min/[1.73_m2]    GFR Estimate If Black >90 >60 mL/min/[1.73_m2]    Calcium 9.2 8.5 - 10.1 mg/dL    Phosphorus 4.0 2.5 - 4.5 mg/dL    Albumin 3.4 3.4 - 5.0 g/dL         Lab Results   Component Value Date    PHOS 4.0 05/24/2019         CBC RESULTS:   Recent Labs   Lab Test 05/24/19  1455   WBC 6.8   RBC 4.72   HGB 14.7   HCT 43.2   MCV 92   MCH 31.1   MCHC 34.0   RDW 15.2*        Recent Labs   Lab Test 05/24/19  1455 11/28/16  1009   * 135   POTASSIUM 3.9 4.0   CHLORIDE 97 104   CO2 28 24   ANIONGAP 7 7   GLC 96 87   BUN 22 21   CR 0.29* 0.39*   ARMANI 9.2 9.1     I reviewed the recent DXA and the imaging is invalid since hip DXA cannot be assessed.         Assessment     Osteoporosis without fracture that  is treated for 5 years with bisphosphonates.   -- since Marcus is wheelchair bound and nonambulatory, with low fall risk given current level of care, I am worried about long term use of bisphosphonates and the risk may outweigh the benefit it renders.   -- moreover I have review previous DXA and it is very difficult to assess his true bone density  -- I recommend that he get a drug holiday and repeat DXA of wrist in a few weeks to establish baseline.   -- He can follow-up with me in 2 years. At that time if there is a decline, Reclast infusion can be considered.   -- this was discussed with care provider.     Hypothyroidism on LT4       Shree Cuevas MD  [id number]  Endocrinology Service        Again, thank you for allowing me to participate in the care of your patient.        Sincerely,        Shree Cuevas MD

## 2019-05-24 NOTE — PROGRESS NOTES
Endocrine Clinic Consult:     Reason for consult:  Date: 05/24/2019  Referring Physician: Maria Eugenia Rogers    Addendum:   High PTH with high normal Vit D levels. Repeat labs in 3 months.   High ALP with normal BSAP, low Na: sent letter to have follow-up with PCP.     HPI:     Marcus Cevallos is a 64 year old male who presents for evaluation of  Osteoporosis.     Initially seen by Dr Sears / Dr Villanueva.   -- treatment for osteoporosis started in 6/6/2014  -- Taking alendronate liquid from G-tube since then.   -- taking Vit D regularly.   -- Ca Co3 from G tube.       Risk Factors:    Wheelchair bound due to cerebral palsy.   Nutrition from G tube.   One prior fracture from accidental fall several years ago.         Symptoms Present? Details   Age // BMI // Race  CM   Increased fall risk  No, wheel chair bound.    Prior Fracture  Yes   Parental hip fracture  unknown   Secondary causes  Yes, nonambulatory    Steroid use  No    Smoking  No    Hypogonadism  unknown   Rheumatoid arthritis  No    Alcohol > 3 drinks a day  No    Femoral BMD  invalid   Sedentary life style  yes   Ca supplement  yes   Vit D supplement  yes            Risk  Present ?    FH of hyper para / MEN / hyper calcemia.  unknown   Sarcoid ?  No    Prolonged immobilization yes   Thiazide No    Vit A No    Thyroid disease / meds On meds.      Other ROS:   At baseline per caregiver.   Grinds teeth or grimaces to show displeasure.   Does not understand language, does not follow up command.   Hoyerlift for transfers.       Past Medical History:   Diagnosis Date     Cataract      Cerebral palsy (H)      CVA (cerebral vascular accident) (H)      HTN (hypertension)      Hypothyroid      Mental retardation      Obesity, unspecified      Retinal detachment, left      Seizure disorder (H)      Sleep apnea      No past surgical history on file.  Family History   Problem Relation Age of Onset     Unknown/Adopted Mother      Hypertension Father       Social History     Socioeconomic History     Marital status: Single     Spouse name: Not on file     Number of children: Not on file     Years of education: Not on file     Highest education level: Not on file   Occupational History     Not on file   Social Needs     Financial resource strain: Not on file     Food insecurity:     Worry: Not on file     Inability: Not on file     Transportation needs:     Medical: Not on file     Non-medical: Not on file   Tobacco Use     Smoking status: Never Smoker     Smokeless tobacco: Never Used   Substance and Sexual Activity     Alcohol use: No     Drug use: No     Sexual activity: Not Currently   Lifestyle     Physical activity:     Days per week: Not on file     Minutes per session: Not on file     Stress: Not on file   Relationships     Social connections:     Talks on phone: Not on file     Gets together: Not on file     Attends Sikh service: Not on file     Active member of club or organization: Not on file     Attends meetings of clubs or organizations: Not on file     Relationship status: Not on file     Intimate partner violence:     Fear of current or ex partner: Not on file     Emotionally abused: Not on file     Physically abused: Not on file     Forced sexual activity: Not on file   Other Topics Concern     Parent/sibling w/ CABG, MI or angioplasty before 65F 55M? No   Social History Narrative     Not on file        Allergies   Allergen Reactions     No Known Allergies      Current Outpatient Medications   Medication     ACETAMINOPHEN PO     ALBUTEROL SULFATE (2.5 MG/3ML) 0.083% IN NEBU     alendronate (FOSAMAX) 70 MG tablet     Aloe-Sodium Chloride (AYR SALINE NASAL GEL NA)     artificial tears OINT ophthalmic ointment     Ascorbic Acid (VITAMIN C PO)     aspirin 81 MG tablet     bisacodyl (DULCOLAX) 10 MG suppository     CALCIUM CARBONATE 1250 MG/5ML OR SUSP     CARBAMIDE PEROXIDE 6.5 % OT SOLN     cetirizine (ZYRTEC) 5 MG CHEW     chlorhexidine  (HIBICLENS) 4 % liquid     diazepam (VALIUM) 5 MG/ML (HIGH CONC) solution     dimethicone (REMEDY NUTRASHIELD) 1 %     econazole nitrate 1 % cream     ergocalciferol (CALCIFEROL) 8000 UNIT/ML drops     Glycopyrrolate (ROBINUL PO)     guaiFENesin 400 MG TABS     hydrocortisone 2.5 % ointment     LACTULOSE PO     levETIRAcetam (KEPPRA) 100 MG/ML solution     levofloxacin (LEVAQUIN) 750 MG tablet     LEVOTHYROXINE SODIUM 50 MCG OR TABS     loratadine (CLARITIN) 10 MG tablet     Multiple Vitamins-Minerals (CERTAVITE SENIOR/ANTIOXIDANT PO)     NEW MED     order for DME     order for DME     PATADAY 0.2 % OP SOLN     perampanel (FYCOMPA) 6 MG tablet     Polyethylene Glycol 3350 (MIRALAX PO)     polyvinyl alcohol (ARTIFICIAL TEARS) 1.4 % ophthalmic solution     PROCTOSOL HC 2.5 % RE CREA     psyllium (METAMUCIL) 58.6 % POWD     REPLETE/FIBER OR LIQD     sennosides (SENOKOT) 8.8 MG/5ML syrup     valproic acid (DEPAKENE) 250 MG/5ML syrup     No current facility-administered medications for this visit.            Exam:  /79 (BP Location: Left arm, Patient Position: Sitting, Cuff Size: Child)   Pulse 66   SpO2 95%    Constitutional: obese male in wheelchair, no distress.   Head: Teeth grinding, moving head   Neck: Neck supple. No adenopathy. Thyroid no goiter  ENT: No throat congestion, no neck nodes or sinus tenderness  Cardiovascular: regular rhythm, no tachycardia  Respiratory: Lungs clear  Gastrointestinal: Abdomen soft, non-tender. BS normal. No striae  Musculoskeletal: gait normal and normal muscle tone  Neurologic: Normal speech, reflexes normal.   Psychiatric: mentation appears normal   Spine not examined.   Extremity: contractures of both hand.     Last Basic Metabolic Panel:    Results for orders placed or performed in visit on 05/24/19   CBC with platelets differential   Result Value Ref Range    WBC 6.8 4.0 - 11.0 10e9/L    RBC Count 4.72 4.4 - 5.9 10e12/L    Hemoglobin 14.7 13.3 - 17.7 g/dL    Hematocrit  43.2 40.0 - 53.0 %    MCV 92 78 - 100 fl    MCH 31.1 26.5 - 33.0 pg    MCHC 34.0 31.5 - 36.5 g/dL    RDW 15.2 (H) 10.0 - 15.0 %    Platelet Count 318 150 - 450 10e9/L    Diff Method Automated Method     % Neutrophils 67.5 %    % Lymphocytes 16.4 %    % Monocytes 8.2 %    % Eosinophils 6.8 %    % Basophils 0.7 %    % Immature Granulocytes 0.4 %    Absolute Neutrophil 4.6 1.6 - 8.3 10e9/L    Absolute Lymphocytes 1.1 0.8 - 5.3 10e9/L    Absolute Monocytes 0.6 0.0 - 1.3 10e9/L    Absolute Eosinophils 0.5 0.0 - 0.7 10e9/L    Absolute Basophils 0.1 0.0 - 0.2 10e9/L    Abs Immature Granulocytes 0.0 0 - 0.4 10e9/L   TSH with free T4 reflex   Result Value Ref Range    TSH 3.90 0.40 - 4.00 mU/L   Alkaline phosphatase   Result Value Ref Range    Alkaline Phosphatase 232 (H) 40 - 150 U/L   Renal panel   Result Value Ref Range    Sodium 132 (L) 133 - 144 mmol/L    Potassium 3.9 3.4 - 5.3 mmol/L    Chloride 97 94 - 109 mmol/L    Carbon Dioxide 28 20 - 32 mmol/L    Anion Gap 7 3 - 14 mmol/L    Glucose 96 70 - 99 mg/dL    Urea Nitrogen 22 7 - 30 mg/dL    Creatinine 0.29 (L) 0.66 - 1.25 mg/dL    GFR Estimate >90 >60 mL/min/[1.73_m2]    GFR Estimate If Black >90 >60 mL/min/[1.73_m2]    Calcium 9.2 8.5 - 10.1 mg/dL    Phosphorus 4.0 2.5 - 4.5 mg/dL    Albumin 3.4 3.4 - 5.0 g/dL         Lab Results   Component Value Date    PHOS 4.0 05/24/2019         CBC RESULTS:   Recent Labs   Lab Test 05/24/19  1455   WBC 6.8   RBC 4.72   HGB 14.7   HCT 43.2   MCV 92   MCH 31.1   MCHC 34.0   RDW 15.2*        Recent Labs   Lab Test 05/24/19  1455 11/28/16  1009   * 135   POTASSIUM 3.9 4.0   CHLORIDE 97 104   CO2 28 24   ANIONGAP 7 7   GLC 96 87   BUN 22 21   CR 0.29* 0.39*   ARMANI 9.2 9.1     I reviewed the recent DXA and the imaging is invalid since hip DXA cannot be assessed.         Assessment     Osteoporosis without fracture that is treated for 5 years with bisphosphonates.   -- since Marcus is wheelchair bound and nonambulatory,  with low fall risk given current level of care, I am worried about long term use of bisphosphonates and the risk may outweigh the benefit it renders.   -- moreover I have review previous DXA and it is very difficult to assess his true bone density  -- I recommend that he get a drug holiday and repeat DXA of wrist in a few weeks to establish baseline.   -- He can follow-up with me in 2 years. At that time if there is a decline, Reclast infusion can be considered.   -- this was discussed with care provider.     Hypothyroidism on LT4       Shree Cuevas MD  [id number]  Endocrinology Service

## 2019-05-24 NOTE — NURSING NOTE
Marcus Cevallos's goals for this visit include:   Chief Complaint   Patient presents with     Consult     Osteoporosis     He requests these members of his care team be copied on today's visit information: Yes    PCP: Maria Eugenia Rogers    Referring Provider:  Maria Eugenia Dowell MD  [facility] PHYSICIAN [facility]  [address]  [place] [address]    /79 (BP Location: Left arm, Patient Position: Sitting, Cuff Size: Child)   Pulse 66   SpO2 95%     Do you need any medication refills at today's visit? No

## 2019-05-26 LAB — ALP BONE SERPL-MCNC: 17.6 UG/L (ref 6.5–20.1)

## 2019-05-29 DIAGNOSIS — E55.9 VITAMIN D DEFICIENCY: ICD-10-CM

## 2019-05-29 DIAGNOSIS — E83.50 DISORDER OF CALCIUM METABOLISM: ICD-10-CM

## 2019-05-29 DIAGNOSIS — R79.89 ELEVATED PARATHYROID HORMONE: Primary | ICD-10-CM

## 2019-05-29 LAB
DEPRECATED CALCIDIOL+CALCIFEROL SERPL-MC: 79 UG/L (ref 20–75)
VITAMIN D2 SERPL-MCNC: 64 UG/L
VITAMIN D3 SERPL-MCNC: 15 UG/L

## 2019-05-29 NOTE — RESULT ENCOUNTER NOTE
High PTH in patient who had prior normal values: Repeat labs in 3 months.   Low Sodium and high ALP: I sent letter to discuss this with PCP and see if he needs further work up.   Shree Cuevas MD  [id number]  Endocrinology Service

## 2019-05-30 ENCOUNTER — OFFICE VISIT (OUTPATIENT)
Dept: OPTOMETRY | Facility: CLINIC | Age: 64
End: 2019-05-30
Payer: MEDICARE

## 2019-05-30 DIAGNOSIS — Z86.69 HISTORY OF RETINAL DETACHMENT: Primary | ICD-10-CM

## 2019-05-30 DIAGNOSIS — Z96.1 PSEUDOPHAKIA OF RIGHT EYE: ICD-10-CM

## 2019-05-30 PROCEDURE — 92014 COMPRE OPH EXAM EST PT 1/>: CPT | Performed by: OPTOMETRIST

## 2019-05-30 ASSESSMENT — EXTERNAL EXAM - RIGHT EYE: OD_EXAM: NORMAL

## 2019-05-30 ASSESSMENT — TONOMETRY
IOP_UNABLETOASSESS: 1
OD_IOP_MMHG: SOFT
OS_IOP_MMHG: SOFT
IOP_METHOD: BOTH EYES NORMAL BY PALPATION

## 2019-05-30 ASSESSMENT — VISUAL ACUITY
METHOD: SNELLEN - LINEAR
OS_SC: NONVERBAL
OD_SC: NONVERBAL

## 2019-05-30 ASSESSMENT — EXTERNAL EXAM - LEFT EYE: OS_EXAM: NORMAL

## 2019-05-30 ASSESSMENT — SLIT LAMP EXAM - LIDS
COMMENTS: BLEPHARITIS
COMMENTS: BLEPHARITIS

## 2019-05-30 ASSESSMENT — REFRACTION_MANIFEST
OS_SPHERE: UNABLE
OD_SPHERE: UNABLE

## 2019-05-30 ASSESSMENT — CUP TO DISC RATIO: OD_RATIO: NO VIEW OF DISC

## 2019-05-30 ASSESSMENT — CONF VISUAL FIELD: COMMENTS: UNABLE

## 2019-05-30 NOTE — PROGRESS NOTES
Chief Complaint   Patient presents with     Annual Eye Exam      Accompanied by staff  Last Eye Exam: 10-  Dilated Previously: Yes    What are you currently using to see?  does not use glasses or contacts       Distance Vision Acuity: nonverbal    Near Vision Acuity:  nonverbal    Eye Comfort: nonverbal  Do you use eye drops? : Yes: systane  Occupation or Hobbies: day program    Cindy Ibarra Optometric Assistant, A.B.O.C.          Medical, surgical and family histories reviewed and updated 5/30/2019.       OBJECTIVE: See Ophthalmology exam    ASSESSMENT:    ICD-10-CM    1. History of retinal detachment Z86.69    2. Pseudophakia of right eye Z96.1       PLAN:     Patient Instructions   Return in 1 year for a complete eye exam or sooner if needed.    Anthony Summers, OD

## 2019-05-30 NOTE — PATIENT INSTRUCTIONS
Return in 1 year for a complete eye exam or sooner if needed.    Anthony Summers, KRISHNA      The affects of the dilating drops last for 4- 6 hours.  You will be more sensitive to light and vision will be blurry up close.  Mydriatic sunglasses were given if needed.      Optometry Providers       Clinic Locations                                 Telephone Number   Dr. Gita Styles   [place] [facility] [place]   [place] [phone]     [facility] Hours:                [facility] Hours:       [facility] Hours:   [address]   [address]     [address] [place] [address]   [place] [address]    LOUISE Styles [address]  Phone: [phone]                    Phone: [phone]     Phone: [phone]                      Monday 8:00-7:00                          Monday 8:00-7:00                          Monday 8:00-7:00              Tuesday 8:00-6:00                          Tuesday 8:00-7:00                          Tuesday 8:00-7:00              Wednesday 8:00-6:00                  Wednesday 8:00-7:00                   Wednesday 8:00-7:00      Thursday 8:00-6:00                        Thursday 8:00-7:00                         Thursday 8:00-7:00            Friday 8:00-5:00                              Friday 8:00-5:00                              Friday 8:00-5:00    [facility] Hours:   [address] [facility] [place] [address]  [phone]    Monday 8:00-7:00  Tuesday 8:00-7:00  Wednesday 8:00-7:00  Thursday 8:00-7:00  Friday 8:00-5:00  Please log on to [email or web address] to order your contact lenses.  The link is found on the [facility] page.  As always, Thank you for trusting us with your health care needs!

## 2019-05-30 NOTE — LETTER
5/30/2019         RE: Marcus Cevallos  [address] [facility] [address]        Dear Colleague,    Thank you for referring your patient, Marcus Cevallos, to the [facility]. Please see a copy of my visit note below.    Chief Complaint   Patient presents with     Annual Eye Exam      Accompanied by staff  Last Eye Exam: 10-  Dilated Previously: Yes    What are you currently using to see?  does not use glasses or contacts       Distance Vision Acuity: nonverbal    Near Vision Acuity:  nonverbal    Eye Comfort: nonverbal  Do you use eye drops? : Yes: systane  Occupation or Hobbies: day program    Cindy Ibarra Optometric Assistant, A.B.O.C.          Medical, surgical and family histories reviewed and updated 5/30/2019.       OBJECTIVE: See Ophthalmology exam    ASSESSMENT:    ICD-10-CM    1. History of retinal detachment Z86.69    2. Pseudophakia of right eye Z96.1       PLAN:     Patient Instructions   Return in 1 year for a complete eye exam or sooner if needed.    Anthony Summers, KRISHNA           Again, thank you for allowing me to participate in the care of your patient.        Sincerely,        Anthony Summers, OD

## 2019-10-17 DIAGNOSIS — G40.319 GENERALIZED CONVULSIVE EPILEPSY WITH INTRACTABLE EPILEPSY (H): ICD-10-CM

## 2020-01-01 ENCOUNTER — TRANSFERRED RECORDS (OUTPATIENT)
Dept: HEALTH INFORMATION MANAGEMENT | Facility: CLINIC | Age: 65
End: 2020-01-01

## 2020-01-01 DIAGNOSIS — G40.319 GENERALIZED CONVULSIVE EPILEPSY WITH INTRACTABLE EPILEPSY (H): ICD-10-CM

## 2020-01-01 DIAGNOSIS — G40.419 INTRACTABLE GENERALIZED TONIC-CLONIC EPILEPSY (H): ICD-10-CM

## 2020-01-01 RX ORDER — PERAMPANEL 6 MG/1
TABLET ORAL
Qty: 31 TABLET | Refills: 5 | Status: SHIPPED | OUTPATIENT
Start: 2020-01-01 | End: 2021-01-01

## 2020-01-01 RX ORDER — DIAZEPAM ORAL SOLUTION (CONCENTRATE) 5 MG/ML
SOLUTION ORAL
Qty: 30 ML | Refills: 0 | Status: SHIPPED | OUTPATIENT
Start: 2020-01-01 | End: 2021-01-01

## 2020-01-13 DIAGNOSIS — G40.419 INTRACTABLE GENERALIZED TONIC-CLONIC EPILEPSY (H): ICD-10-CM

## 2020-01-14 NOTE — TELEPHONE ENCOUNTER
diazepam (VALIUM) 5 MG/ML (HIGH CONC) solution      Last Written Prescription Date:  3-26-19  Last Fill Quantity: 30 ml,   # refills: 0  Last Office Visit : 3-26-19  Future Office visit:  2-    Routing refill request to provider for review/approval because:  Controlled medication.      Kathleen M Doege RN

## 2020-01-20 DIAGNOSIS — L30.9 DERMATITIS: ICD-10-CM

## 2020-01-20 RX ORDER — DIAZEPAM ORAL SOLUTION (CONCENTRATE) 5 MG/ML
SOLUTION ORAL
Qty: 30 ML | Refills: 5 | Status: SHIPPED | OUTPATIENT
Start: 2020-01-20 | End: 2020-01-01

## 2020-01-20 NOTE — TELEPHONE ENCOUNTER
"RN unable to refill Hibiclens.  Date of last OV: 11/1/18  Reason for visit: skin erosion, buttock  When advised to RTC: saw wound care on 1/14/19: \"PLAN   We will try switching to a Critic-Aid clear barrier cream with an antifungal component which will be applied 3 times a day to the area.  However I explained to the group home employee that I think the main thing that needs to be done here in order to allow this area to heal is to address the urinary incontinence more effectively. I strongly suggested that they contact the patient's primary care doctor first since he had ordered the condom catheter to let him know that this is not functioning. They may try a different size of condom catheter or refer the patient to a urologist for further assistance in managing the urinary incontinence.  I explained the group home employee as long as there is urinary incontinence to this degree I do not expect the area to heal regardless of the ointment or bandage regiment that we try.  He will return to the wound clinic in 4 weeks to assess if there is any progress with this regimen.\"      BP Readings from Last 2 Encounters:   05/24/19 133/79   03/26/19 138/76     Nat Myers RN  "

## 2020-02-28 ENCOUNTER — OFFICE VISIT (OUTPATIENT)
Dept: NEUROLOGY | Facility: CLINIC | Age: 65
End: 2020-02-28
Payer: MEDICARE

## 2020-02-28 DIAGNOSIS — G40.909 SEIZURE DISORDER (H): ICD-10-CM

## 2020-02-28 DIAGNOSIS — G40.319 GENERALIZED CONVULSIVE EPILEPSY WITH INTRACTABLE EPILEPSY (H): Primary | ICD-10-CM

## 2020-02-28 RX ORDER — LEVETIRACETAM 100 MG/ML
SOLUTION ORAL
Qty: 775 ML | Refills: 11 | Status: SHIPPED | OUTPATIENT
Start: 2020-02-28 | End: 2021-01-01

## 2020-02-28 ASSESSMENT — PAIN SCALES - GENERAL: PAINLEVEL: NO PAIN (0)

## 2020-02-28 NOTE — PROGRESS NOTES
INTERVAL HX: Now on 6 mg perampanel HS and doing very well. Now only approximately 0-3 sz lasting more than 1 min per mo;  Rare use of rescue meds. Group home feels perampanel has made major improvement in number and severity of sz.     SEIZURE HISTORY REVIEWED2/28/20:  Marcus is a man, who has been a [facility] patient for many years.   He is in a group home.  He is having his baseline number of seizures overall; but the group home feels that, if anything, he has improved somewhat.  They have fairly extensive records of seizures.  He was having nonconvulsive seizures, approximately 15-20 per month.  These are very brief jerks.  He also has occasional generalized convulsive seizures, but these actually have not occurred for some years.  He had his 1st seizure at age 7, but this is on a baseline of very severe issues.  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  He has a G tube in place for many years because of frequent aspiration pneumonias.  He had a stroke in the 1980s and subsequently has been unable to speak.  He has also had hemorrhoids, left ankle fracture and hip surgery.      FAMILY HISTORY:  Includes a sister, who has tuberous sclerosis.        He has been reliant on Diazepam Intensol through the G tube for clusters of seizures.  The diazepam is very helpful, and he has had no emergency room visits in the last years.     SOCIAL HISTORY:  He has been residing in a group home now for many years and appears to be well cared for.      REVIEW OF SYSTEMS:  Through the group home.     He has had no fevers, no pneumonias, no cardiac issues, no issues with blood pressure, no significant issues with  other than needs to be fed through a gastric tube.   :  Is incontinent.     MUSCULOSKELETAL:  No major issues noted.      PHYSICAL EXAMINATION:  There were no vitals  taken for this visit.     He is completely wheelchair-bound.  He has a small body size with foreshortened upper and lower extremities consistent with early childhood cerebral palsy.  He has a feeding tube in place.      Attention Span:  Nonresponsive verbally, minimally responsive to strong stimuli.  In wheelchair, he does have roving eye movements and not clear whether he is actually fixating.      Mild spasticity of upper and lower extremities.      ASSESSMENT:  He is  deteriorating.    Adding perampanel has resulted in a significant reduction in sz.  Now is stable   PLAN:     1.  Continue  perampanel 6 mg hs, LEV 12.5 ml (1250 mg) bid; VPA10 ml (500mg) bid.  2. RTC 1 year  3. Labs for chemistries and ASD levels

## 2020-02-28 NOTE — LETTER
2020     RE: Marcus Cevallos  : 1955   MRN: [id number]      Dear Colleague,    Thank you for referring your patient, Marcus Cevallos, to the [facility] EPILEPSY [facility]ARE at [facility]. Please see a copy of my visit note below.    INTERVAL HX: Now on 6 mg perampanel HS and doing very well. Now only approximately 0-3 sz lasting more than 1 min per mo;  Rare use of rescue meds. Group home feels perampanel has made major improvement in number and severity of sz.     SEIZURE HISTORY REVIEWED20:  Marcus is a man, who has been a [facility] patient for many years.   He is in a group home.  He is having his baseline number of seizures overall; but the group home feels that, if anything, he has improved somewhat.  They have fairly extensive records of seizures.  He was having nonconvulsive seizures, approximately 15-20 per month.  These are very brief jerks.  He also has occasional generalized convulsive seizures, but these actually have not occurred for some years.  He had his 1st seizure at age 7, but this is on a baseline of very severe issues.  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  He has a G tube in place for many years because of frequent aspiration pneumonias.  He had a stroke in the  and subsequently has been unable to speak.  He has also had hemorrhoids, left ankle fracture and hip surgery.      FAMILY HISTORY:  Includes a sister, who has tuberous sclerosis.        He has been reliant on Diazepam Intensol through the G tube for clusters of seizures.  The diazepam is very helpful, and he has had no emergency room visits in the last years.     SOCIAL HISTORY:  He has been residing in a group home now for many years and appears to be well cared for.      REVIEW OF SYSTEMS:  Through the group home.     He has  had no fevers, no pneumonias, no cardiac issues, no issues with blood pressure, no significant issues with  other than needs to be fed through a gastric tube.   :  Is incontinent.     MUSCULOSKELETAL:  No major issues noted.      PHYSICAL EXAMINATION:  There were no vitals taken for this visit.     He is completely wheelchair-bound.  He has a small body size with foreshortened upper and lower extremities consistent with early childhood cerebral palsy.  He has a feeding tube in place.      Attention Span:  Nonresponsive verbally, minimally responsive to strong stimuli.  In wheelchair, he does have roving eye movements and not clear whether he is actually fixating.      Mild spasticity of upper and lower extremities.      ASSESSMENT:  He is  deteriorating.    Adding perampanel has resulted in a significant reduction in sz.  Now is stable   PLAN:     1.  Continue  perampanel 6 mg hs, LEV 12.5 ml (1250 mg) bid; VPA10 ml (500mg) bid.  2. RTC 1 year  3. Labs for chemistries and ASD levels    Again, thank you for allowing me to participate in the care of your patient.      Sincerely,    Nanda De La Torre MD

## 2020-03-11 ENCOUNTER — DOCUMENTATION ONLY (OUTPATIENT)
Dept: LAB | Facility: CLINIC | Age: 65
End: 2020-03-11

## 2020-03-11 DIAGNOSIS — E55.9 VITAMIN D DEFICIENCY: ICD-10-CM

## 2020-03-11 DIAGNOSIS — E83.50 DISORDER OF CALCIUM METABOLISM: ICD-10-CM

## 2020-03-11 DIAGNOSIS — R79.89 ELEVATED PARATHYROID HORMONE: ICD-10-CM

## 2020-03-11 DIAGNOSIS — E55.9 VITAMIN D DEFICIENCY: Primary | ICD-10-CM

## 2020-03-11 LAB
1,25(OH)2D SERPL-MCNC: NORMAL PG/ML (ref 19.9–79.3)
CALCIUM SERPL-MCNC: NORMAL MG/DL (ref 8.5–10.1)
DEPRECATED CALCIDIOL+CALCIFEROL SERPL-MC: NORMAL UG/L (ref 20–75)
PHOSPHATE SERPL-MCNC: NORMAL MG/DL (ref 2.5–4.5)
PTH-INTACT SERPL-MCNC: NORMAL PG/ML (ref 18–80)

## 2020-03-11 PROCEDURE — 82652 VIT D 1 25-DIHYDROXY: CPT | Performed by: INTERNAL MEDICINE

## 2020-03-11 PROCEDURE — 82306 VITAMIN D 25 HYDROXY: CPT | Performed by: INTERNAL MEDICINE

## 2020-03-11 PROCEDURE — 82310 ASSAY OF CALCIUM: CPT | Performed by: INTERNAL MEDICINE

## 2020-03-11 PROCEDURE — 36415 COLL VENOUS BLD VENIPUNCTURE: CPT | Performed by: INTERNAL MEDICINE

## 2020-03-11 PROCEDURE — 84100 ASSAY OF PHOSPHORUS: CPT | Performed by: INTERNAL MEDICINE

## 2020-03-11 NOTE — PROGRESS NOTES
Please collect and sign lab orders. Patient had a lab appointment today 3/11/2020 at the [facility]. Blood samples were collected. Thank you.  lab

## 2020-03-12 LAB
1,25(OH)2D SERPL-MCNC: 67.9 PG/ML (ref 19.9–79.3)
CALCIUM SERPL-MCNC: 9.6 MG/DL (ref 8.5–10.1)
DEPRECATED CALCIDIOL+CALCIFEROL SERPL-MC: 40 UG/L (ref 20–75)
PHOSPHATE SERPL-MCNC: 3.4 MG/DL (ref 2.5–4.5)

## 2020-03-13 ENCOUNTER — APPOINTMENT (OUTPATIENT)
Dept: NEUROLOGY | Facility: CLINIC | Age: 65
End: 2020-03-13
Payer: MEDICARE

## 2020-03-13 ENCOUNTER — DOCUMENTATION ONLY (OUTPATIENT)
Dept: OTHER | Facility: CLINIC | Age: 65
End: 2020-03-13

## 2020-08-14 NOTE — TELEPHONE ENCOUNTER
Fycompa 6 MG Tablet      Last Written Prescription Date:  2/28/2020  Last Fill Quantity: 31,   # refills: 5  Last Office Visit : 2/28/2020  Future Office visit:  None    Routing refill request to provider for review/approval because:  Drug not on the [facility], [facility] or  Health refill protocol or controlled substance      Vani Hernandez RN  Central Triage Red Flags/Med Refills

## 2020-09-02 NOTE — TELEPHONE ENCOUNTER
diazePAM 5 MG/ML Concentrate    Last Written Prescription Date:  1/20/2020  Last Fill Quantity: 30,   # refills: 5  Last Office Visit : 2/28/2020  Future Office visit:  None    Routing refill request to provider for review/approval because:  Drug not on the [facility], [facility] or [facility] refill protocol or controlled substance      Vani Hernandez RN  Central Triage Red Flags/Med Refills

## 2021-01-01 ENCOUNTER — VIRTUAL VISIT (OUTPATIENT)
Dept: NEUROLOGY | Facility: CLINIC | Age: 66
End: 2021-01-01
Payer: MEDICARE

## 2021-01-01 ENCOUNTER — TELEPHONE (OUTPATIENT)
Dept: NEUROLOGY | Facility: CLINIC | Age: 66
End: 2021-01-01

## 2021-01-01 DIAGNOSIS — G40.909 SEIZURE DISORDER (H): ICD-10-CM

## 2021-01-01 DIAGNOSIS — G40.419 INTRACTABLE GENERALIZED TONIC-CLONIC EPILEPSY (H): ICD-10-CM

## 2021-01-01 DIAGNOSIS — G40.319 GENERALIZED CONVULSIVE EPILEPSY WITH INTRACTABLE EPILEPSY (H): ICD-10-CM

## 2021-01-01 RX ORDER — LEVETIRACETAM 100 MG/ML
SOLUTION ORAL
Qty: 775 ML | Refills: 11 | Status: SHIPPED | OUTPATIENT
Start: 2021-01-01

## 2021-01-01 RX ORDER — VALPROIC ACID 250 MG/5ML
500 SOLUTION ORAL 2 TIMES DAILY
Qty: 1800 ML | Refills: 0 | Status: SHIPPED | OUTPATIENT
Start: 2021-01-01 | End: 2021-01-01

## 2021-01-01 RX ORDER — VALPROIC ACID 250 MG/5ML
500 SOLUTION ORAL 2 TIMES DAILY
Qty: 1800 ML | Refills: 3 | Status: SHIPPED | OUTPATIENT
Start: 2021-01-01

## 2021-01-01 RX ORDER — DIAZEPAM ORAL SOLUTION (CONCENTRATE) 5 MG/ML
SOLUTION ORAL
Qty: 30 ML | Refills: 1 | Status: SHIPPED | OUTPATIENT
Start: 2021-01-01

## 2021-01-20 NOTE — TELEPHONE ENCOUNTER
valproic acid (DEPAKENE) 250 MG/5ML syrup   TAKE 10ML (500MG) PER G-TUBE TWICE DAILY.     Last Written Prescription Date:  2/28/20  Last Fill Quantity: 620 ml,   # refills: 10  Last Office Visit : 2/28/20   Return in about 1 year (around 2/28/2021).    Future Office visit:  none  26 months: AED level, AST or SGOT, platelets.     Routing refill request to provider for review/approval because:  Overdue  Labs - AED level, (AST and SGOT - outside labs 11/19/18)    Outside lab 11/23/18 - 2 days shy of being overdue.   PLATELET COUNT 150 - 400 K/

## 2021-02-19 NOTE — PROGRESS NOTES
Hugh is a 66 year old who is being evaluated via a billable video visit.      INTERVAL HX: Now on 6 mg perampanel HS for more than a year and doing very well. Now only approximately 0-3 sz lasting more than 1 min per mo;  Rare use of rescue meds. Group home feels perampanel has made major improvement in number and severity of sz.     SEIZURE HISTORY REVIEWED 2/19/21:  Marcus pacheco has been a [facility] patient for many years.   He is in a group home.   They have fairly extensive records of seizures.  He was having nonconvulsive seizures, approximately 15-20 per month.  These are very brief jerks.  He also has occasional generalized convulsive seizures, but these actually have not occurred for some years.  He had his 1st seizure at age 7, but this is on a baseline of very severe issues.  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  He has a G tube in place for many years because of frequent aspiration pneumonias.  He had a stroke in the 1980s and subsequently has been unable to speak.  He has also had hemorrhoids, left ankle fracture and hip surgery.      FAMILY HISTORY:  Includes a sister, who has tuberous sclerosis.        He has been reliant on Diazepam Intensol through the G tube for clusters of seizures.  The diazepam is very helpful, and he has had no emergency room visits in the last years.     SOCIAL HISTORY:  He has been residing in a group home now for many years and appears to be well cared for.      REVIEW OF SYSTEMS:  Through the group home.     He has   pneumonia , no cardiac issues, no issues with blood pressure, no significant issues with  other than needs to be fed through a gastric tube.   :  Is incontinent.     MUSCULOSKELETAL:  No major issues noted.      PHYSICAL EXAMINATION:  There were no vitals taken for this visit.     He is completely  "wheelchair-bound.  He has a small body size with foreshortened upper and lower extremities consistent with early childhood cerebral palsy.  He has a feeding tube in place.      Attention Span:  Nonresponsive verbally, minimally responsive to strong stimuli.   Mild spasticity of upper and lower extremities.      ASSESSMENT:  He is  deteriorating.  Sleeps most of the time. Has had low PO2s  Adding perampanel has resulted in a significant reduction in sz.  Now is stable. We discussed increasing perampanel, but staff ok with current Rx. He has deteriorated more, and now on \"hospice care\" in group home.  PLAN:     1.  Continue  perampanel 6 mg hs, LEV 12.5 ml (1250 mg) bid; VPA10 ml (500mg) bid.  2. RTC 10 mo             How would you like to obtain your AVS? Mail a copy  If the video visit is dropped, the invitation should be resent by: Send to e-mail at: [email or web address]     Will anyone else be joining your video visit?   Butch       Video Start Time: Start 9:37  end 9:57 2      Video-Visit Details    Type of service:  Video Visit    Video End Time:9:37    Originating Location (pt. Location): Home    Distant Location (provider location):  [facility]     Platform used for Video Visit: Surjit    "

## 2021-02-19 NOTE — LETTER
2021       RE: Marcus Cevallos  : 1955   MRN: [id number]      Dear Colleague,    Thank you for referring your patient, Marcus Cevallos, to the [facility] EPILEPSY CARE at [facility]. Please see a copy of my visit note below.    Hugh is a 66 year old who is being evaluated via a billable video visit.      INTERVAL HX: Now on 6 mg perampanel HS for more than a year and doing very well. Now only approximately 0-3 sz lasting more than 1 min per mo;  Rare use of rescue meds. Group home feels perampanel has made major improvement in number and severity of sz.     SEIZURE HISTORY REVIEWED 21:  Marcus pacheco has been a [facility] patient for many years.   He is in a group home.   They have fairly extensive records of seizures.  He was having nonconvulsive seizures, approximately 15-20 per month.  These are very brief jerks.  He also has occasional generalized convulsive seizures, but these actually have not occurred for some years.  He had his 1st seizure at age 7, but this is on a baseline of very severe issues.  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  He has a G tube in place for many years because of frequent aspiration pneumonias.  He had a stroke in the  and subsequently has been unable to speak.  He has also had hemorrhoids, left ankle fracture and hip surgery.      FAMILY HISTORY:  Includes a sister, who has tuberous sclerosis.        He has been reliant on Diazepam Intensol through the G tube for clusters of seizures.  The diazepam is very helpful, and he has had no emergency room visits in the last years.     SOCIAL HISTORY:  He has been residing in a group home now for many years and appears to be well cared for.      REVIEW OF SYSTEMS:  Through the group home.     He has   pneumonia , no  "cardiac issues, no issues with blood pressure, no significant issues with  other than needs to be fed through a gastric tube.   :  Is incontinent.     MUSCULOSKELETAL:  No major issues noted.      PHYSICAL EXAMINATION:  There were no vitals taken for this visit.     He is completely wheelchair-bound.  He has a small body size with foreshortened upper and lower extremities consistent with early childhood cerebral palsy.  He has a feeding tube in place.      Attention Span:  Nonresponsive verbally, minimally responsive to strong stimuli.   Mild spasticity of upper and lower extremities.      ASSESSMENT:  He is  deteriorating.  Sleeps most of the time. Has had low PO2s  Adding perampanel has resulted in a significant reduction in sz.  Now is stable. We discussed increasing perampanel, but staff ok with current Rx. He has deteriorated more, and now on \"hospice care\" in group home.  PLAN:     1.  Continue  perampanel 6 mg hs, LEV 12.5 ml (1250 mg) bid; VPA10 ml (500mg) bid.  2. RTC 10 mo             How would you like to obtain your AVS? Mail a copy  If the video visit is dropped, the invitation should be resent by: Send to e-mail at: [email or web address]     Will anyone else be joining your video visit?   Butch       Video Start Time: Start 9:37  end 9:57 2      Video-Visit Details    Type of service:  Video Visit    Video End Time:9:37    Originating Location (pt. Location): Home    Distant Location (provider location):  [facility]LE[facility]     Platform used for Video Visit: Surjit    Again, thank you for allowing me to participate in the care of your patient.      Sincerely,    Nanda De La Torre MD      "

## 2021-04-06 NOTE — TELEPHONE ENCOUNTER
diazePAM 5 MG/ML Concentrate  Last Written Prescription Date:  9/3/2020  Last Fill Quantity: 30,   # refills: 0  Last Office Visit : 2/19/2021  Future Office visit:  None    Routing refill request to provider for review/approval because:  Drug not on the [facility], [facility] or [facility] refill protocol or controlled substance      Vani Hernandez RN  Central Triage Red Flags/Med Refills

## 2021-05-24 NOTE — TELEPHONE ENCOUNTER
What is the concern that needs to be addressed by a nurse? Pt had large increase in seizures this past weekend, started on Friday (4 seizures, longest 30 minutes), continued all weekend,  longest 2 hours, pt is on hospice, and they would like to change meds: decrease scheduled diazepam, increase depakote, etc. Please call back     May a detailed message be left on voicemail? Yes     Date of last office visit: 2/19/21    Message routed to: [facility] rn pool

## 2021-05-24 NOTE — TELEPHONE ENCOUNTER
Caregiver calls the office reporting prolonged seizures over the weekend. Seizure was 30 minutes on Friday and 90 minutes yesterday; arm tenses and flails; twitching.     Being treated for UTI. Started antibiotics yesterday. Hospice team ordered diazepam 10 mg TID scheduled and prn which the guardian reportedly asked the group home nurse Mayra to check with Dr. De La Torre to see if he agreed on this dose. The staff and guardian have concerns about respiratory vulnerability with this treatment. I reviewed the case with Dr. De La Torre, he does not agree with the treatment with scheduled diazepam but would treat prn preferably with IM medication and buccally if IM is not available at his group home. I shared this with Nurse Mayra. She states they cannot accommodate PRN injections. An appointment was scheduled with Dr. De La Torre for tomorrow to discuss the situation further.

## 2022-05-23 NOTE — PATIENT INSTRUCTIONS
Obtain wrist dexa and lab testing   From July 1, 2019, stop taking alendronate.   Follow-up in about 2 years for repeat dexa scan.      no

## 2023-04-18 NOTE — TELEPHONE ENCOUNTER
Detail Level: Simple Writer spoke with Mukul who states he can not take orders and therefore provided writer with the nurse on calls number Ronna [phone]. Writer spoke with Ronna and informed of results. She has requested that we fax the orders to the home at [phone]. Ronna had also requested that Dr. Cruz order the Bactrim and she requests in a solution. Will route message to Dr. Cruz to print prescriptions so we can fax them to the home. A copy of the culture report needs to be faxed when fax the orders per Dr. Cotton request below.    Naa Santacruz LPN